# Patient Record
Sex: FEMALE | Race: WHITE | NOT HISPANIC OR LATINO | Employment: FULL TIME | URBAN - METROPOLITAN AREA
[De-identification: names, ages, dates, MRNs, and addresses within clinical notes are randomized per-mention and may not be internally consistent; named-entity substitution may affect disease eponyms.]

---

## 2017-01-08 ENCOUNTER — APPOINTMENT (OUTPATIENT)
Dept: CARDIOLOGY | Facility: HOSPITAL | Age: 67
End: 2017-01-08
Attending: INTERNAL MEDICINE

## 2017-01-08 ENCOUNTER — APPOINTMENT (OUTPATIENT)
Dept: CT IMAGING | Facility: HOSPITAL | Age: 67
End: 2017-01-08

## 2017-01-08 ENCOUNTER — HOSPITAL ENCOUNTER (INPATIENT)
Facility: HOSPITAL | Age: 67
LOS: 2 days | Discharge: HOME | End: 2017-01-10
Attending: EMERGENCY MEDICINE | Admitting: INTERNAL MEDICINE

## 2017-01-08 DIAGNOSIS — E66.9 DIABETES MELLITUS TYPE 2 IN OBESE (HCC): ICD-10-CM

## 2017-01-08 DIAGNOSIS — N18.9 CHRONIC RENAL INSUFFICIENCY, UNSPECIFIED STAGE: ICD-10-CM

## 2017-01-08 DIAGNOSIS — I96 NECROTIC TOES (HCC): ICD-10-CM

## 2017-01-08 DIAGNOSIS — E11.69 DIABETES MELLITUS TYPE 2 IN OBESE (HCC): ICD-10-CM

## 2017-01-08 DIAGNOSIS — L03.115 CELLULITIS OF RIGHT LOWER EXTREMITY WITHOUT FOOT: Primary | ICD-10-CM

## 2017-01-08 DIAGNOSIS — I73.9 PERIPHERAL VASCULAR DISEASE (HCC): ICD-10-CM

## 2017-01-08 DIAGNOSIS — I10 ESSENTIAL HYPERTENSION: ICD-10-CM

## 2017-01-08 DIAGNOSIS — I87.2 STASIS DERMATITIS OF BOTH LEGS: ICD-10-CM

## 2017-01-08 PROBLEM — I25.10 CAD (CORONARY ARTERY DISEASE): Status: ACTIVE | Noted: 2017-01-08

## 2017-01-08 PROBLEM — N18.30 CKD STAGE 3 DUE TO TYPE 2 DIABETES MELLITUS: Status: ACTIVE | Noted: 2017-01-08

## 2017-01-08 PROBLEM — E66.01 MORBID OBESITY DUE TO EXCESS CALORIES (HCC): Status: ACTIVE | Noted: 2017-01-08

## 2017-01-08 PROBLEM — E03.9 HYPOTHYROID: Status: ACTIVE | Noted: 2017-01-08

## 2017-01-08 PROBLEM — E11.22 CKD STAGE 3 DUE TO TYPE 2 DIABETES MELLITUS: Status: ACTIVE | Noted: 2017-01-08

## 2017-01-08 PROBLEM — IMO0002 DIABETES TYPE 2, UNCONTROLLED: Status: ACTIVE | Noted: 2017-01-08

## 2017-01-08 LAB
ALBUMIN SERPL-MCNC: 3.7 G/DL (ref 3.2–4.8)
ALBUMIN/GLOB SERPL: 1 G/DL (ref 1.5–2.5)
ALP SERPL-CCNC: 76 U/L (ref 25–100)
ALT SERPL W P-5'-P-CCNC: 10 U/L (ref 7–40)
ANION GAP SERPL CALCULATED.3IONS-SCNC: 8 MMOL/L (ref 3–11)
AST SERPL-CCNC: 11 U/L (ref 0–33)
BACTERIA UR QL AUTO: ABNORMAL /HPF
BASOPHILS # BLD AUTO: 0.04 10*3/MM3 (ref 0–0.2)
BASOPHILS NFR BLD AUTO: 0.3 % (ref 0–1)
BILIRUB SERPL-MCNC: 0.3 MG/DL (ref 0.3–1.2)
BILIRUB UR QL STRIP: ABNORMAL
BUN BLD-MCNC: 24 MG/DL (ref 9–23)
BUN/CREAT SERPL: 16 (ref 7–25)
CALCIUM SPEC-SCNC: 9.5 MG/DL (ref 8.7–10.4)
CHLORIDE SERPL-SCNC: 104 MMOL/L (ref 99–109)
CLARITY UR: ABNORMAL
CO2 SERPL-SCNC: 24 MMOL/L (ref 20–31)
COLOR UR: ABNORMAL
CREAT BLD-MCNC: 1.5 MG/DL (ref 0.6–1.3)
CRP SERPL-MCNC: 89.4 MG/DL (ref 0–10)
D-LACTATE SERPL-SCNC: 1.3 MMOL/L (ref 0.5–2)
DEPRECATED RDW RBC AUTO: 44.3 FL (ref 37–54)
EOSINOPHIL # BLD AUTO: 0.58 10*3/MM3 (ref 0.1–0.3)
EOSINOPHIL NFR BLD AUTO: 3.9 % (ref 0–3)
ERYTHROCYTE [DISTWIDTH] IN BLOOD BY AUTOMATED COUNT: 13.5 % (ref 11.3–14.5)
ERYTHROCYTE [SEDIMENTATION RATE] IN BLOOD: 17 MM/HR (ref 0–30)
GFR SERPL CREATININE-BSD FRML MDRD: 35 ML/MIN/1.73
GLOBULIN UR ELPH-MCNC: 3.6 GM/DL
GLUCOSE BLD-MCNC: 141 MG/DL (ref 70–100)
GLUCOSE BLDC GLUCOMTR-MCNC: 189 MG/DL (ref 70–130)
GLUCOSE BLDC GLUCOMTR-MCNC: 240 MG/DL (ref 70–130)
GLUCOSE BLDC GLUCOMTR-MCNC: 243 MG/DL (ref 70–130)
GLUCOSE BLDC GLUCOMTR-MCNC: 303 MG/DL (ref 70–130)
GLUCOSE UR STRIP-MCNC: NEGATIVE MG/DL
HBA1C MFR BLD: 9.6 % (ref 4.8–5.6)
HCT VFR BLD AUTO: 41.8 % (ref 34.5–44)
HGB BLD-MCNC: 13.6 G/DL (ref 11.5–15.5)
HGB UR QL STRIP.AUTO: NEGATIVE
HYALINE CASTS UR QL AUTO: ABNORMAL /LPF
IMM GRANULOCYTES # BLD: 0.11 10*3/MM3 (ref 0–0.03)
IMM GRANULOCYTES NFR BLD: 0.7 % (ref 0–0.6)
KETONES UR QL STRIP: ABNORMAL
LEUKOCYTE ESTERASE UR QL STRIP.AUTO: ABNORMAL
LIPASE SERPL-CCNC: 48 U/L (ref 6–51)
LYMPHOCYTES # BLD AUTO: 3.7 10*3/MM3 (ref 0.6–4.8)
LYMPHOCYTES NFR BLD AUTO: 24.8 % (ref 24–44)
MCH RBC QN AUTO: 28.8 PG (ref 27–31)
MCHC RBC AUTO-ENTMCNC: 32.5 G/DL (ref 32–36)
MCV RBC AUTO: 88.6 FL (ref 80–99)
MONOCYTES # BLD AUTO: 1.32 10*3/MM3 (ref 0–1)
MONOCYTES NFR BLD AUTO: 8.8 % (ref 0–12)
NEUTROPHILS # BLD AUTO: 9.17 10*3/MM3 (ref 1.5–8.3)
NEUTROPHILS NFR BLD AUTO: 61.5 % (ref 41–71)
NITRITE UR QL STRIP: NEGATIVE
PH UR STRIP.AUTO: <=5 [PH] (ref 5–8)
PLAT MORPH BLD: NORMAL
PLATELET # BLD AUTO: 448 10*3/MM3 (ref 150–450)
PMV BLD AUTO: 9.7 FL (ref 6–12)
POTASSIUM BLD-SCNC: 4.7 MMOL/L (ref 3.5–5.5)
PROT SERPL-MCNC: 7.3 G/DL (ref 5.7–8.2)
PROT UR QL STRIP: ABNORMAL
RBC # BLD AUTO: 4.72 10*6/MM3 (ref 3.89–5.14)
RBC # UR: ABNORMAL /HPF
RBC MORPH BLD: NORMAL
REF LAB TEST METHOD: ABNORMAL
SODIUM BLD-SCNC: 136 MMOL/L (ref 132–146)
SP GR UR STRIP: 1.03 (ref 1–1.03)
SQUAMOUS #/AREA URNS HPF: ABNORMAL /HPF
UROBILINOGEN UR QL STRIP: ABNORMAL
WBC MORPH BLD: NORMAL
WBC NRBC COR # BLD: 14.92 10*3/MM3 (ref 3.5–10.8)
WBC UR QL AUTO: ABNORMAL /HPF

## 2017-01-08 PROCEDURE — 93970 EXTREMITY STUDY: CPT

## 2017-01-08 PROCEDURE — 63710000001 INSULIN DETEMIR PER 5 UNITS: Performed by: INTERNAL MEDICINE

## 2017-01-08 PROCEDURE — G0378 HOSPITAL OBSERVATION PER HR: HCPCS

## 2017-01-08 PROCEDURE — 25010000002 HEPARIN (PORCINE) PER 1000 UNITS: Performed by: INTERNAL MEDICINE

## 2017-01-08 PROCEDURE — 25010000002 KETOROLAC TROMETHAMINE PER 15 MG: Performed by: EMERGENCY MEDICINE

## 2017-01-08 PROCEDURE — 87040 BLOOD CULTURE FOR BACTERIA: CPT | Performed by: EMERGENCY MEDICINE

## 2017-01-08 PROCEDURE — 83690 ASSAY OF LIPASE: CPT | Performed by: EMERGENCY MEDICINE

## 2017-01-08 PROCEDURE — 82962 GLUCOSE BLOOD TEST: CPT

## 2017-01-08 PROCEDURE — 80053 COMPREHEN METABOLIC PANEL: CPT | Performed by: EMERGENCY MEDICINE

## 2017-01-08 PROCEDURE — 81001 URINALYSIS AUTO W/SCOPE: CPT | Performed by: EMERGENCY MEDICINE

## 2017-01-08 PROCEDURE — 85652 RBC SED RATE AUTOMATED: CPT | Performed by: INTERNAL MEDICINE

## 2017-01-08 PROCEDURE — 86140 C-REACTIVE PROTEIN: CPT | Performed by: INTERNAL MEDICINE

## 2017-01-08 PROCEDURE — 99283 EMERGENCY DEPT VISIT LOW MDM: CPT

## 2017-01-08 PROCEDURE — 63710000001 INSULIN LISPRO (HUMAN) PER 5 UNITS: Performed by: INTERNAL MEDICINE

## 2017-01-08 PROCEDURE — 85025 COMPLETE CBC W/AUTO DIFF WBC: CPT | Performed by: EMERGENCY MEDICINE

## 2017-01-08 PROCEDURE — 83036 HEMOGLOBIN GLYCOSYLATED A1C: CPT | Performed by: INTERNAL MEDICINE

## 2017-01-08 PROCEDURE — 73700 CT LOWER EXTREMITY W/O DYE: CPT

## 2017-01-08 PROCEDURE — 25010000002 VANCOMYCIN: Performed by: EMERGENCY MEDICINE

## 2017-01-08 PROCEDURE — 87086 URINE CULTURE/COLONY COUNT: CPT | Performed by: EMERGENCY MEDICINE

## 2017-01-08 PROCEDURE — 85007 BL SMEAR W/DIFF WBC COUNT: CPT | Performed by: EMERGENCY MEDICINE

## 2017-01-08 PROCEDURE — 83605 ASSAY OF LACTIC ACID: CPT | Performed by: EMERGENCY MEDICINE

## 2017-01-08 PROCEDURE — 99223 1ST HOSP IP/OBS HIGH 75: CPT | Performed by: INTERNAL MEDICINE

## 2017-01-08 RX ORDER — KETOROLAC TROMETHAMINE 15 MG/ML
15 INJECTION, SOLUTION INTRAMUSCULAR; INTRAVENOUS ONCE
Status: COMPLETED | OUTPATIENT
Start: 2017-01-08 | End: 2017-01-08

## 2017-01-08 RX ORDER — ONDANSETRON 2 MG/ML
4 INJECTION INTRAMUSCULAR; INTRAVENOUS EVERY 6 HOURS PRN
Status: DISCONTINUED | OUTPATIENT
Start: 2017-01-08 | End: 2017-01-10 | Stop reason: HOSPADM

## 2017-01-08 RX ORDER — DEXTROSE MONOHYDRATE 25 G/50ML
25 INJECTION, SOLUTION INTRAVENOUS AS NEEDED
Status: DISCONTINUED | OUTPATIENT
Start: 2017-01-08 | End: 2017-01-10 | Stop reason: HOSPADM

## 2017-01-08 RX ORDER — CLOPIDOGREL BISULFATE 75 MG/1
75 TABLET ORAL DAILY
Status: DISCONTINUED | OUTPATIENT
Start: 2017-01-08 | End: 2017-01-10 | Stop reason: HOSPADM

## 2017-01-08 RX ORDER — ASPIRIN 81 MG/1
81 TABLET, CHEWABLE ORAL DAILY
Status: DISCONTINUED | OUTPATIENT
Start: 2017-01-08 | End: 2017-01-10 | Stop reason: HOSPADM

## 2017-01-08 RX ORDER — VANCOMYCIN/0.9 % SOD CHLORIDE 1.75 G/5
12 PLASTIC BAG, INJECTION (ML) INTRAVENOUS EVERY 24 HOURS
Status: DISCONTINUED | OUTPATIENT
Start: 2017-01-09 | End: 2017-01-10 | Stop reason: HOSPADM

## 2017-01-08 RX ORDER — FAMOTIDINE 20 MG/1
20 TABLET, FILM COATED ORAL 2 TIMES DAILY
Status: DISCONTINUED | OUTPATIENT
Start: 2017-01-08 | End: 2017-01-10 | Stop reason: HOSPADM

## 2017-01-08 RX ORDER — LEVOTHYROXINE SODIUM 112 UG/1
112 TABLET ORAL
Status: DISCONTINUED | OUTPATIENT
Start: 2017-01-08 | End: 2017-01-10 | Stop reason: HOSPADM

## 2017-01-08 RX ORDER — DOCUSATE SODIUM 100 MG/1
100 CAPSULE, LIQUID FILLED ORAL 2 TIMES DAILY
Status: DISCONTINUED | OUTPATIENT
Start: 2017-01-08 | End: 2017-01-10 | Stop reason: HOSPADM

## 2017-01-08 RX ORDER — HEPARIN SODIUM 5000 [USP'U]/ML
5000 INJECTION, SOLUTION INTRAVENOUS; SUBCUTANEOUS EVERY 12 HOURS
Status: DISCONTINUED | OUTPATIENT
Start: 2017-01-08 | End: 2017-01-10 | Stop reason: HOSPADM

## 2017-01-08 RX ORDER — SODIUM CHLORIDE 0.9 % (FLUSH) 0.9 %
1-10 SYRINGE (ML) INJECTION AS NEEDED
Status: DISCONTINUED | OUTPATIENT
Start: 2017-01-08 | End: 2017-01-10 | Stop reason: HOSPADM

## 2017-01-08 RX ORDER — ATORVASTATIN CALCIUM 20 MG/1
20 TABLET, FILM COATED ORAL NIGHTLY
Status: DISCONTINUED | OUTPATIENT
Start: 2017-01-08 | End: 2017-01-10 | Stop reason: HOSPADM

## 2017-01-08 RX ORDER — ONDANSETRON 4 MG/1
4 TABLET, FILM COATED ORAL EVERY 6 HOURS PRN
Status: DISCONTINUED | OUTPATIENT
Start: 2017-01-08 | End: 2017-01-10 | Stop reason: HOSPADM

## 2017-01-08 RX ORDER — SODIUM CHLORIDE 9 MG/ML
100 INJECTION, SOLUTION INTRAVENOUS CONTINUOUS
Status: DISCONTINUED | OUTPATIENT
Start: 2017-01-08 | End: 2017-01-08

## 2017-01-08 RX ORDER — ACETAMINOPHEN 325 MG/1
650 TABLET ORAL EVERY 6 HOURS PRN
Status: DISCONTINUED | OUTPATIENT
Start: 2017-01-08 | End: 2017-01-10 | Stop reason: HOSPADM

## 2017-01-08 RX ORDER — SODIUM CHLORIDE 0.9 % (FLUSH) 0.9 %
10 SYRINGE (ML) INJECTION AS NEEDED
Status: DISCONTINUED | OUTPATIENT
Start: 2017-01-08 | End: 2017-01-10 | Stop reason: HOSPADM

## 2017-01-08 RX ORDER — NICOTINE POLACRILEX 4 MG
15 LOZENGE BUCCAL AS NEEDED
Status: DISCONTINUED | OUTPATIENT
Start: 2017-01-08 | End: 2017-01-10 | Stop reason: HOSPADM

## 2017-01-08 RX ADMIN — SODIUM CHLORIDE 100 ML/HR: 9 INJECTION, SOLUTION INTRAVENOUS at 08:00

## 2017-01-08 RX ADMIN — INSULIN DETEMIR 25 UNITS: 100 INJECTION, SOLUTION SUBCUTANEOUS at 11:55

## 2017-01-08 RX ADMIN — INSULIN LISPRO 10 UNITS: 100 INJECTION, SOLUTION INTRAVENOUS; SUBCUTANEOUS at 11:57

## 2017-01-08 RX ADMIN — HEPARIN SODIUM 5000 UNITS: 5000 INJECTION, SOLUTION INTRAVENOUS; SUBCUTANEOUS at 21:49

## 2017-01-08 RX ADMIN — DOCUSATE SODIUM 100 MG: 100 CAPSULE, LIQUID FILLED ORAL at 18:20

## 2017-01-08 RX ADMIN — VANCOMYCIN HYDROCHLORIDE 2000 MG: 1 INJECTION, POWDER, LYOPHILIZED, FOR SOLUTION INTRAVENOUS at 03:27

## 2017-01-08 RX ADMIN — KETOROLAC TROMETHAMINE 15 MG: 15 INJECTION, SOLUTION INTRAMUSCULAR; INTRAVENOUS at 03:18

## 2017-01-08 RX ADMIN — INSULIN LISPRO 8 UNITS: 100 INJECTION, SOLUTION INTRAVENOUS; SUBCUTANEOUS at 11:55

## 2017-01-08 RX ADMIN — HEPARIN SODIUM 5000 UNITS: 5000 INJECTION, SOLUTION INTRAVENOUS; SUBCUTANEOUS at 09:59

## 2017-01-08 RX ADMIN — DOCUSATE SODIUM 100 MG: 100 CAPSULE, LIQUID FILLED ORAL at 09:59

## 2017-01-08 RX ADMIN — ASPIRIN 81 MG 81 MG: 81 TABLET ORAL at 09:59

## 2017-01-08 RX ADMIN — INSULIN LISPRO 3 UNITS: 100 INJECTION, SOLUTION INTRAVENOUS; SUBCUTANEOUS at 10:00

## 2017-01-08 RX ADMIN — INSULIN LISPRO 8 UNITS: 100 INJECTION, SOLUTION INTRAVENOUS; SUBCUTANEOUS at 10:00

## 2017-01-08 RX ADMIN — CLOPIDOGREL 75 MG: 75 TABLET, FILM COATED ORAL at 10:00

## 2017-01-08 RX ADMIN — FAMOTIDINE 20 MG: 20 TABLET ORAL at 18:21

## 2017-01-08 RX ADMIN — LEVOTHYROXINE SODIUM 112 MCG: 112 TABLET ORAL at 06:46

## 2017-01-08 RX ADMIN — ATORVASTATIN CALCIUM 20 MG: 20 TABLET, FILM COATED ORAL at 21:50

## 2017-01-08 RX ADMIN — INSULIN LISPRO 5 UNITS: 100 INJECTION, SOLUTION INTRAVENOUS; SUBCUTANEOUS at 21:49

## 2017-01-08 RX ADMIN — ERTAPENEM SODIUM 1 G: 1 INJECTION, POWDER, LYOPHILIZED, FOR SOLUTION INTRAMUSCULAR; INTRAVENOUS at 13:35

## 2017-01-08 RX ADMIN — INSULIN LISPRO 8 UNITS: 100 INJECTION, SOLUTION INTRAVENOUS; SUBCUTANEOUS at 18:21

## 2017-01-08 RX ADMIN — SODIUM CHLORIDE 100 ML/HR: 9 INJECTION, SOLUTION INTRAVENOUS at 06:48

## 2017-01-08 RX ADMIN — FAMOTIDINE 20 MG: 20 TABLET ORAL at 09:59

## 2017-01-08 RX ADMIN — INSULIN LISPRO 5 UNITS: 100 INJECTION, SOLUTION INTRAVENOUS; SUBCUTANEOUS at 18:21

## 2017-01-08 RX ADMIN — INSULIN DETEMIR 25 UNITS: 100 INJECTION, SOLUTION SUBCUTANEOUS at 21:49

## 2017-01-08 NOTE — H&P
[facility] Medicine History and Physical    Primary Care Physician: No Known Provider    Chief complaint     R.lower ext cellulitis.    History of Present Illness  66 year morbidly obese female presented to the ER with the chief complaint of right lower extremity erythema and pain.  Symptoms started approximately 3 days back when she started noticing erythema, today she got tightness in her calf and pain on weightbearing and that is why she presented to the ER.  She does complain of  chills diaphoresis.  She also states she has the right fourth toenail came off approximately 10 days back, she did go to the podiatrist and they were just doing some dressings and monitoring it.    ROS  Review of Systems   Constitutional: Positive for activity change, chills and diaphoresis. Negative for fatigue and fever.   HENT: Negative for ear discharge, nosebleeds, sore throat and trouble swallowing.    Eyes: Negative for photophobia, discharge and visual disturbance.   Respiratory: Negative for cough, chest tightness, shortness of breath and wheezing.    Cardiovascular: Negative for chest pain and palpitations.   Gastrointestinal: Negative for abdominal pain, blood in stool, diarrhea and vomiting.   Endocrine: Negative for cold intolerance, polydipsia and polyuria.   Genitourinary: Negative for dysuria, flank pain and hematuria.   Musculoskeletal: Negative for joint swelling and neck stiffness.   Skin: Positive for color change (on her R.lower ext.), rash and wound.   Neurological: Negative for dizziness, seizures, syncope, speech difficulty and headaches.   Hematological: Negative for adenopathy. Does not bruise/bleed easily.   Psychiatric/Behavioral: Negative for agitation and confusion. The patient is not nervous/anxious.         Otherwise complete ROS is negative except as mentioned in the HPI.    Past Medical History:  Active Problems:    * No active hospital problems. *       Past Medical History   Diagnosis Date   • CKD (chronic  kidney disease)    • Coronary artery disease-sp pci in past, no chestpain    • Diabetes mellitus-poor control    • Dyslipidemia    • Gangrene sp amp of 5th R.toe    • Hypertension    • Hypothyroidism          Past Surgical History:  Past Surgical History   Procedure Laterality Date   • Tonsillectomy     • Foot surgery Right 12/11/2015     Right 5Th Toe Amputation   • Coronary angioplasty with stent placement     • Toe amputation         Family History:   family history includes Coronary artery disease in her mother; Diabetes in her sister; Hypertension in her mother; Leukemia in her father; Macular degeneration in her mother; Other in her mother.    Social History:    reports that she has never smoked. She has never used smokeless tobacco. She reports that she does not drink alcohol or use illicit drugs.    Medications:    (Not in a hospital admission)  Allergies   Allergen Reactions   • Amoxicillin            Physical Exam:   Temp:  [98.1 °F (36.7 °C)] 98.1 °F (36.7 °C)  Heart Rate:  [77] 77  Resp:  [18] 18  BP: (129)/(62) 129/62  VITALS-   AS ABOVE.  GENERAL- Not distressed, well nourished.  Morbidly obese  RS- CTA-BL, ,  No wheezing , no crackles, good effort.  CVS- s1s2 Regular, no murmur.  ABD- soft, non tender,  distended, no organomegaly. BS good.  EXT- no edema. Pulses poorly palpable more on L then R.venostatic changes on L.ankle area.  But has erythematous changes on the right lower extremity extending up to the knee, patient had almost wet gangrenous looking fourth right toe with foul odor.  NEURO- AAO-3, power 5/5 in all ext,  gross sensory deficit both feet., cranial nerves intact.   EYES- Conjunctivae are normal. Pupils are equal, round, and reactive to light. No scleral icterus.   ENT- no external ear nose lesions, mucosa dry.  NECK-  No tracheal deviation present. No thyromegaly present,No cervical lymphadenopathy.  JOINTS/MSK- no deformity, no swelling.  SKIN-  Rash as described , warm to  touch.  PSYCHIATRIC-  has a normal mood and affect.Thought content normal.           Results Reviewed:  I have personally reviewed current lab, radiology, and data and agree.  Lab Results   Component Value Date    GLUCOSE 141 (H) 01/08/2017    BUN 24 (H) 01/08/2017    CREATININE 1.50 (H) 01/08/2017    EGFRIFNONA 35 (L) 01/08/2017    BCR 16.0 01/08/2017    CO2 24.0 01/08/2017    CALCIUM 9.5 01/08/2017    ALBUMIN 3.70 01/08/2017    LABIL2 1.0 (L) 01/08/2017    AST 11 01/08/2017    ALT 10 01/08/2017     Lab Results   Component Value Date    WBC 14.92 (H) 01/08/2017    HGB 13.6 01/08/2017    HCT 41.8 01/08/2017    MCV 88.6 01/08/2017     01/08/2017     No results found for: CKTOTAL, CKMB, CKMBINDEX, TROPONINI, TROPONINT    Imaging Results (last 24 hours)     ** No results found for the last 24 hours. **              OLD RECORDS REVIEW BY ME.      Assessment/Plan   Right lower extremity cellulitis  -With WBC of 14, with chills, started on vancomycin we'll add on Invanz check ESR CRP.  -Patient has poorly palpable pulses more so on the left as compared to the right, we will try to Doppler the pulse may need further's peripheral vascular studies.    Right fourth great toe infection  -May need further MRI/ct scan  to see the infectious process.  -Continue antibiotics    DM 2-fingerstick 141  -Patient is supposed to take Levemir 100 units in the morning/120 at night, using only 50 units twice a day currently.  -Fingerstick coverage plus long-acting 25 twice a day.    CRI with a KI.  -Patient clinically appears dehydrated, IV fluids  -Check UA.    Hypothyroid  Hypertension  Hyperlipidemia  -All of the above are stable continue her home medications, we'll hold her lisinopril since she is in acute kidney injury area did    DVT PXL  -daniel's/scds  -lovenox      I discussed the patients findings and my recommendations with: patient/family.      Opal Villafuerte MD  01/08/17  4:31 AM

## 2017-01-08 NOTE — PROGRESS NOTES
"Discharge Planning Assessment  [facility]     Patient Name: Nuris Ribeiro  MRN: [id number]  Today's Date: 1/8/2017    Admit Date: 1/8/2017          Discharge Needs Assessment       01/08/17 1032    Living Environment    Lives With spouse    Living Arrangements house    Provides Primary Care For no one    Quality Of Family Relationships unable to assess    Able to Return to Prior Living Arrangements yes    Discharge Needs Assessment    Concerns To Be Addressed denies needs/concerns at this time;no discharge needs identified    Readmission Within The Last 30 Days no previous admission in last 30 days    Anticipated Changes Related to Illness none    Equipment Currently Used at Home none    Equipment Needed After Discharge none    Transportation Available car;family or friend will provide    Discharge Disposition home or self-care    Discharge Contact Information if Applicable Shai Ribeiro, spouse  [phone]            Discharge Plan       01/08/17 1034    Case Management/Social Work Plan    Plan Home    Patient/Family In Agreement With Plan yes    Additional Comments Spoke with patient at bedside regarding discharge planning.  Patient denies use of Home Health or DME.  Patient denies difficulty affording medications.  Patient lives in a multilevel home with her , both work full time.  Patient indicates some difficulty affording diabetic medications and admits to \"stretching\" it as far as she can because of the expense.  Patient has had an amputation of a toe in the past.  CM to follow for needs.  Patient goal is to be discharged home via car with family when medically ready.        Discharge Placement     No information found                Demographic Summary       01/08/17 1031    Referral Information    Admission Type inpatient    Arrived From home or self-care    Referral Source admission list    Reason For Consult discharge planning    Primary Care Physician Information    Name Pa Milan MD "            Functional Status       01/08/17 1032    Functional Status Current    Current Functional Level Comment Per Nursing Assessment    Functional Status Prior    Ambulation 0-->independent    Transferring 0-->independent    Toileting 0-->independent    Bathing 0-->independent    Dressing 0-->independent    Eating 0-->independent    Communication 0-->understands/communicates without difficulty    Swallowing 0-->swallows foods/liquids without difficulty    IADL    Medications independent    Meal Preparation independent    Housekeeping independent    Laundry independent    Shopping independent    Oral Care independent    Activity Tolerance    Current Activity Limitations none    Usual Activity Tolerance good    Current Activity Tolerance good    Employment/Financial    Employment/Finance Comments Medicare/[employer] Med Supp/[employer] Rx            Psychosocial     None            Abuse/Neglect     None            Legal     None            Substance Abuse     None            Patient Forms     None          Dianna Li RN

## 2017-01-08 NOTE — PLAN OF CARE
Problem: Pain, Acute (Adult)  Goal: Identify Related Risk Factors and Signs and Symptoms  Outcome: Ongoing (interventions implemented as appropriate)    01/08/17 0518   Pain, Acute   Related Risk Factors (Acute Pain) developmental disability;disease process;knowledge deficit;patient perception;persistent pain   Signs and Symptoms (Acute Pain) alteration in muscle tone;sleep pattern alteration;verbalization of pain descriptors

## 2017-01-08 NOTE — ED PROVIDER NOTES
Subjective   HPI Comments: Nuris Ribeiro is a 66 y.o.female who presents to the ED with c/o leg swelling. 2 days ago she began having swelling, redness and pain in her right calf that has steadily worsened and has not improved with elevation or ice. Tonight her leg began to feel tight, prompting her to come to the ED. Additionally pt reports a toenail has broken off several days ago and she has been seen by her podiatrist for this.     Hx of DM,     Patient is a 66 y.o. female presenting with lower extremity pain.   History provided by:  Patient  Lower Extremity Issue   Location:  Leg  Time since incident:  2 days  Injury: no    Leg location:  R leg  Pain details:     Quality:  Throbbing    Severity:  Mild    Onset quality:  Gradual    Duration:  3 days    Timing:  Constant    Progression:  Worsening  Chronicity:  New  Dislocation: no    Relieved by:  Nothing  Worsened by:  Nothing  Associated symptoms: swelling    Associated symptoms: no fever        Review of Systems   Constitutional: Negative for chills and fever.   Respiratory: Negative for shortness of breath.    Cardiovascular: Positive for leg swelling. Negative for chest pain.   Skin: Positive for color change.   All other systems reviewed and are negative.      Past Medical History   Diagnosis Date   • CKD (chronic kidney disease)    • Coronary artery disease    • Diabetes mellitus    • Dyslipidemia    • Gangrene    • Hypertension    • Hypothyroidism        Allergies   Allergen Reactions   • Amoxicillin        Past Surgical History   Procedure Laterality Date   • Tonsillectomy     • Foot surgery Right 12/11/2015     Right 5Th Toe Amputation   • Coronary angioplasty with stent placement     • Toe amputation         Family History   Problem Relation Age of Onset   • Hypertension Mother    • Coronary artery disease Mother    • Macular degeneration Mother    • Other Mother      DYSLIPIDEMIA   • Leukemia Father    • Diabetes Sister        Social History      Social History   • Marital status:      Spouse name: N/A   • Number of children: N/A   • Years of education: N/A     Social History Main Topics   • Smoking status: Never Smoker   • Smokeless tobacco: Never Used   • Alcohol use No   • Drug use: No   • Sexual activity: Not Asked     Other Topics Concern   • None     Social History Narrative   • None         Objective   Physical Exam   Constitutional: She is oriented to person, place, and time. She appears well-developed and well-nourished. No distress.   HENT:   Head: Normocephalic and atraumatic.   Eyes: Conjunctivae are normal.   Neck: Trachea normal, normal range of motion and phonation normal. Neck supple. No JVD present.   Cardiovascular: Normal rate, regular rhythm, normal heart sounds and intact distal pulses.    Pulmonary/Chest: Effort normal and breath sounds normal. No respiratory distress.   Abdominal: Soft. There is no tenderness.   Obesity.    Musculoskeletal: Normal range of motion. She exhibits edema (RLE).   Several scab wounds on the anterior shin and ankle, 4th toe demonstrates distal ischemia, erythema from the ankle proximal nearly up to the knee with chronic stasis dermatitis bilaterally with R>L.  5th toe amputation on the right.    Neurological: She is alert and oriented to person, place, and time. She has normal strength. Coordination normal.   Skin: Skin is warm and dry. She is not diaphoretic. There is erythema. No pallor.   Psychiatric: She has a normal mood and affect. Her speech is normal and behavior is normal.   Nursing note and vitals reviewed.      Procedures         ED Course  ED Course   Value Comment By Time   Creatinine: (!) 1.50 (Reviewed) Donaldo Gonzalez MD 01/08 0324   WBC: (!) 14.92 (Reviewed) Donaldo Gonzalez MD 01/08 0352    The patient has findings consistent with cellulitis and edema on the right lower extremity with chronic venous stasis and stasis dermatitis of both lower extremities which is worse in  the right low.  Screening lower extremity Doppler demonstrates no evidence of DVT though it is extremely limited secondary to patient's body habitus and difficulty in cooperation.At the base to the hospital for further evaluation and management.  Case discussed with Dr. Villafuerte who agrees to plan for admission. Donaldo Gonzalez MD 01/08 0357       Course of Care      Lab Results (last 24 hours)     Procedure Component Value Units Date/Time    Blood Culture [[id number]] Collected:  01/08/17 0245    Specimen:  Blood from Arm, Left Updated:  01/08/17 0344    CBC & Differential [[id number]] Collected:  01/08/17 0248    Specimen:  Blood Updated:  01/08/17 0341    Narrative:       The following orders were created for panel order CBC & Differential.  Procedure                               Abnormality         Status                     ---------                               -----------         ------                     Scan Slide[[id number]]                    Normal              Final result               CBC Auto Differential[[id number]]         Abnormal            Final result                 Please view results for these tests on the individual orders.    Comprehensive Metabolic Panel [[id number]]  (Abnormal) Collected:  01/08/17 0248    Specimen:  Blood Updated:  01/08/17 0321     Glucose 141 (H) mg/dL      BUN 24 (H) mg/dL      Creatinine 1.50 (H) mg/dL      Sodium 136 mmol/L      Potassium 4.7 mmol/L      Chloride 104 mmol/L      CO2 24.0 mmol/L      Calcium 9.5 mg/dL      Total Protein 7.3 g/dL      Albumin 3.70 g/dL      ALT (SGPT) 10 U/L      AST (SGOT) 11 U/L      Alkaline Phosphatase 76 U/L      Total Bilirubin 0.3 mg/dL      eGFR Non African Amer 35 (L) mL/min/1.73      Globulin 3.6 gm/dL      A/G Ratio 1.0 (L) g/dL      BUN/Creatinine Ratio 16.0      Anion Gap 8.0 mmol/L     Narrative:       National Kid[employer] Guidelines    Stage                           Description                             GFR                       1                               Normal or High                          90+  2                               Mild decrease                            60-89  3                               Moderate decrease                   30-59  4                               Severe decrease                       15-29  5                               Kidney failure                             <15    Lipase [[id number]]  (Normal) Collected:  01/08/17 0248    Specimen:  Blood Updated:  01/08/17 0321     Lipase 48 U/L     Blood Culture [[id number]] Collected:  01/08/17 0248    Specimen:  Blood from Arm, Left Updated:  01/08/17 0344    CBC Auto Differential [[id number]]  (Abnormal) Collected:  01/08/17 0248    Specimen:  Blood Updated:  01/08/17 0341     WBC 14.92 (H) 10*3/mm3      RBC 4.72 10*6/mm3      Hemoglobin 13.6 g/dL      Hematocrit 41.8 %      MCV 88.6 fL      MCH 28.8 pg      MCHC 32.5 g/dL      RDW 13.5 %      RDW-SD 44.3 fl      MPV 9.7 fL      Platelets 448 10*3/mm3      Neutrophil % 61.5 %      Lymphocyte % 24.8 %      Monocyte % 8.8 %      Eosinophil % 3.9 (H) %      Basophil % 0.3 %      Immature Grans % 0.7 (H) %      Neutrophils, Absolute 9.17 (H) 10*3/mm3      Lymphocytes, Absolute 3.70 10*3/mm3      Monocytes, Absolute 1.32 (H) 10*3/mm3      Eosinophils, Absolute 0.58 (H) 10*3/mm3      Basophils, Absolute 0.04 10*3/mm3      Immature Grans, Absolute 0.11 (H) 10*3/mm3     Scan Slide [[id number]]  (Normal) Collected:  01/08/17 0248    Specimen:  Blood Updated:  01/08/17 0341     RBC Morphology Normal      WBC Morphology Normal      Platelet Morphology Normal     Lactic Acid, Plasma [[id number]]  (Normal) Collected:  01/08/17 0321    Specimen:  Blood Updated:  01/08/17 0417     Lactate 1.3 mmol/L       Falsely depressed results may occur on samples drawn from patients receiving N-Acetylcysteine (NAC) or Metamizole.       Urinalysis With / Culture If Indicated [[id number]]  (Abnormal) Collected:  01/08/17 0327     "Specimen:  Urine from Urine, Clean Catch Updated:  01/08/17 0411     Color, UA Red (A)      Appearance, UA Cloudy (A)      pH, UA <=5.0      Specific Gravity, UA 1.035 (H)      Glucose, UA Negative      Ketones, UA Trace (A)      Bilirubin, UA Small (1+) (A)      Blood, UA Negative      Protein, UA 30 mg/dL (1+) (A)      Leuk Esterase, UA Trace (A)      Nitrite, UA Negative      Urobilinogen, UA 0.2 E.U./dL     Urinalysis, Microscopic Only [[id number]]  (Abnormal) Collected:  01/08/17 0327    Specimen:  Urine from Urine, Clean Catch Updated:  01/08/17 0411     RBC, UA  /HPF      Unable to determine due to loaded field (A)     WBC, UA 6-12 (A) /HPF      Bacteria, UA Trace /HPF      Squamous Epithelial Cells, UA 21-30 (A) /HPF      Hyaline Casts, UA 13-20 /LPF      Methodology Manual Light Microscopy     Urine Culture [[id number]] Collected:  01/08/17 0327    Specimen:  Urine from Urine, Clean Catch Updated:  01/08/17 0350          Note: In addition to lab results from this visit, the labs listed above may include labs taken at another facility or during a different encounter within the last 24 hours. Please correlate lab times with ED admission and discharge times for further clarification of the services performed during this visit.    No orders to display       Vitals:    01/08/17 0205   BP: 129/62   BP Location: Left arm   Patient Position: Sitting   Pulse: 77   Resp: 18   Temp: 98.1 °F (36.7 °C)   TempSrc: Oral   SpO2: 95%   Weight: (!) 305 lb (138 kg)   Height: 68\" (172.7 cm)       Medications   sodium chloride 0.9 % flush 10 mL (not administered)   vancomycin 2000 mg/500 mL 0.9% NS IVPB ([facility]) (2,000 mg Intravenous New Bag 1/8/17 0327)   ketorolac (TORADOL) injection 15 mg (15 mg Intravenous Given 1/8/17 0318)       ECG/EMG Results (last 24 hours)     ** No results found for the last 24 hours. **                        MDM  Number of Diagnoses or Management Options  Cellulitis of right lower extremity without foot: "   Chronic renal insufficiency, unspecified stage:   Diabetes mellitus type 2 in obese:   Essential hypertension:   Necrotic toes:   Peripheral vascular disease:   Stasis dermatitis of both legs:      Amount and/or Complexity of Data Reviewed  Clinical lab tests: reviewed  Decide to obtain previous medical records or to obtain history from someone other than the patient: yes  Review and summarize past medical records: yes  Discuss the patient with other providers: yes  Independent visualization of images, tracings, or specimens: yes      EMR Dragon/Transcription disclaimer:   Much of this encounter note is an electronic transcription/translation of spoken language to printed text. The electronic translation of spoken language may permit erroneous, or at times, nonsensical words or phrases to be inadvertently transcribed; Although I have reviewed the note for such errors, some may still exist.     Final diagnoses:   Cellulitis of right lower extremity without foot   Peripheral vascular disease   Necrotic toes   Diabetes mellitus type 2 in obese   Essential hypertension   Chronic renal insufficiency, unspecified stage   Stasis dermatitis of both legs       Documentation assistance provided by misbah Salinas.  Information recorded by the scribaileen was done at my direction and has been verified and validated by me.     Mynor Salinas  01/08/17 0252       Mynor Salinas  01/08/17 0400       Donaldo Gonzalez MD  01/08/17 0437

## 2017-01-08 NOTE — PROGRESS NOTES
Pharmacokinetic Consult - Vancomycin Dosing    Nuris Ribeiro is a 66 y.o. female who has been consulted for vancomycin dosing for complicated skin and soft tissue infection.    Relevant clinical data and objective history reviewed:  CREATININE   Date Value Ref Range Status   01/08/2017 1.50 (H) 0.60 - 1.30 mg/dL Final   01/07/2016 1.2 0.6 - 1.3 mg/dL Final   12/31/2015 1.1 0.6 - 1.3 mg/dL Final   12/12/2015 1.2 0.6 - 1.3 mg/dL Final     BUN   Date Value Ref Range Status   01/08/2017 24 (H) 9 - 23 mg/dL Final   01/07/2016 19 9 - 23 mg/dL Final   12/31/2015 22 9 - 23 mg/dL Final   12/12/2015 19 9 - 23 mg/dL Final     Estimated Creatinine Clearance: 55.2 mL/min (by C-G formula based on Cr of 1.5).     Lab Results   Component Value Date/Time    WBC 14.92 (H) 01/08/2017 02:48 AM    WBC 14.46 (H) 01/07/2016 04:47 PM    HGB 13.6 01/08/2017 02:48 AM    HGB 15.3 01/07/2016 04:47 PM    HCT 41.8 01/08/2017 02:48 AM    HCT 46.7 (H) 01/07/2016 04:47 PM    MCV 88.6 01/08/2017 02:48 AM    MCV 90.7 01/07/2016 04:47 PM     01/08/2017 02:48 AM     01/07/2016 04:47 PM     Temp Readings from Last 3 Encounters:   01/08/17 98.1 °F (36.7 °C) (Oral)   06/16/16 97.7 °F (36.5 °C)   02/18/16 98.2 °F (36.8 °C)     Weight: (!) 305 lb (138 kg)      Assessment/Plan  The patient will be started on vancomycin utilizing scheduled dosing (SCr = 1.5, Est CrCl = 55). Vancomycin 2000 mg IV x 1 (given), then Vancomycin 1750 mg IV Q24H (12.7 mg/kg/dose for weight > 110 kg)  Plan for trough as patient approaches steady state, prior to the 3rd dose (1/10/17 06:00)  Due to infection severity, will target a trough of 10-15 ug/mL.  Pharmacy will continue to follow the patient’s culture results and clinical progress daily.    Jeronimo Braun RPH

## 2017-01-08 NOTE — PROGRESS NOTES
Please see the history and physical dated earlier today for full details.  Briefly she is a 66-year-old morbidly obese diabetic who presents to the emergency room with complaints of right lower extremity swelling, erythema, warmth.  She has a previous history of right fifth toe amputation in January 2015 on that leg at that time was followed by Dr. Liu and with Dr. Garza from infectious disease.  Since that time has done well.  About 10 days ago she had a toenail come off on the right fourth digit that has been having some questionable purulent drainage.  On exam her right leg has some circumferential erythema consistent with cellulitis.  She's been placed on vancomycin and Invanz.  Continue that for now I'll have Dr. Garza see her tomorrow for further antibiotic recommendations.  She'll need tight control of her glucose, and her A1c is greater than 9% currently.  Duplex of the right lower extremity is currently being done and I will follow up on those results.    Boby Sanchez MD

## 2017-01-08 NOTE — IP AVS SNAPSHOT
"   AFTER VISIT SUMMARY             Nuris Ribeiro           About your hospitalization     You were admitted on:  January 8, 2017 You last received care in the:  [facility]       Procedures & Surgeries         Medications    If you or your caregiver advised us that you are currently taking a medication and that medication is marked below as “Resume”, this simply indicates that we have reviewed those medications to make sure our new therapy recommendations do not interfere.  If you have concerns about medications other than those new ones which we are prescribing today, please consult the physician who prescribed them (or your primary physician).  Our review of your home medications is not meant to indicate that we are directing their use.             Your Medications      START taking these medications     metroNIDAZOLE 500 MG tablet   Take 1 tablet by mouth 3 (Three) Times a Day.   Commonly known as:  FLAGYL             CONTINUE taking these medications     aspirin 81 MG tablet   Take  by mouth.           clopidogrel 75 MG tablet   Take  by mouth.   Last time this was given:  1/10/2017  8:39 AM   Commonly known as:  PLAVIX           insulin detemir 100 UNIT/ML injection   Inject  under the skin.   Commonly known as:  LEVEMIR           HUMALOG KWIKPEN 100 UNIT/ML solution pen-injector   Inject  under the skin. 35 units am, 35 units lunch, 75 units at dinner   Generic drug:  Insulin Lispro           Insulin Lispro 100 UNIT/ML solution pen-injector   Inject  under the skin.   Commonly known as:  HUMALOG           Insulin Syringe-Needle U-100 31G X 5/16\" 1 ML misc           levothyroxine 112 MCG tablet   Take  by mouth.   Last time this was given:  1/10/2017  5:31 AM   Commonly known as:  SYNTHROID, LEVOTHROID           lisinopril 20 MG tablet   Take  by mouth.   Commonly known as:  PRINIVIL,ZESTRIL           metFORMIN 1000 MG tablet   Take 1,000 mg by mouth 2 (two) times a day with meals.   " "  Commonly known as:  GLUCOPHAGE           simvastatin 40 MG tablet   Take  by mouth.   Commonly known as:  ZOCOR                Where to Get Your Medications      These medications were sent to [facility] [place], [place] - [address] [place] [address] - [phone] PH - [phone] FX  [address] [place] [address]     Phone:  [phone]     metroNIDAZOLE 500 MG tablet                  Your Medications      Your Medication List           Morning Noon Evening Bedtime As Needed    aspirin 81 MG tablet   Take  by mouth.                                clopidogrel 75 MG tablet   Take  by mouth.   Commonly known as:  PLAVIX                                insulin detemir 100 UNIT/ML injection   Inject  under the skin.   Commonly known as:  LEVEMIR                                * HUMALOG KWIKPEN 100 UNIT/ML solution pen-injector   Inject  under the skin. 35 units am, 35 units lunch, 75 units at dinner   Generic drug:  Insulin Lispro                                * Insulin Lispro 100 UNIT/ML solution pen-injector   Inject  under the skin.   Commonly known as:  HUMALOG                                Insulin Syringe-Needle U-100 31G X 5/16\" 1 ML misc                                levothyroxine 112 MCG tablet   Take  by mouth.   Commonly known as:  SYNTHROID, LEVOTHROID                                lisinopril 20 MG tablet   Take  by mouth.   Commonly known as:  PRINIVIL,ZESTRIL                                metFORMIN 1000 MG tablet   Take 1,000 mg by mouth 2 (two) times a day with meals.   Commonly known as:  GLUCOPHAGE                                metroNIDAZOLE 500 MG tablet   Take 1 tablet by mouth 3 (Three) Times a Day.   Commonly known as:  FLAGYL                                simvastatin 40 MG tablet   Take  by mouth.   Commonly known as:  ZOCOR                                * Notice:  This list has 2 medication(s) that are the same as other medications prescribed for you. Read the directions carefully, " and ask your doctor or other care provider to review them with you.               Your facility administered medication list      NEW facility administered medications        DETAILS INSTRUCTIONS                cefTRIAXone 2 G injection           Morning    Around Noon    Afternoon                Instructions for After Discharge        Activity Instructions     Activity as Tolerated                 Diet Instructions     Diet: Consistent Carbohydrate, Cardiac; Thin Liquids, No Restrictions       Discharge Diet:   Consistent Carbohydrate  Cardiac      Fluid Consistency:  Thin Liquids, No Restrictions             Discharge References/Attachments     CELLULITIS, EASY-TO-READ (ENGLISH)    CEFTRIAXONE INJECTION (ENGLISH)    METRONIDAZOLE TABLETS OR CAPSULES (ENGLISH)       Follow-ups for After Discharge        Follow-up Information     Follow up with No Known Provider .    Contact information:    [facility] [address]          Follow up with Pa Garza MD .    Specialty:  Infectious Diseases    Why:  APPOINTMENT:  Tomorrow January 11, 2017 at 1:00pm and on January 17, 2017 at 8:30am    Contact information:    [facility] [address]  [facility] [address] [phone]        Referrals and Follow-ups to Schedule     Follow-Up    As directed    Follow Up Details:  Dr. Garza in office tomorrow           Additional information on Labs and Follow-ups:      YOU WILL GO TO [facility] FOR DAILY ANTIBIOTICS.                 [employer] Signup     Our records indicate that your [facility] [email or web address] account has been deactivated. If you would like to reactivate your account, please email [facility] [email or web address] or call [phone] to talk to our [email or web address] staff.         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, Coronary Heart Disease, High Blood Pressure, Underactive Thyroid, Ckd Stage 3 Due To Type 2 Diabetes Mellitus      Care Providers     Provider Service Role Specialty    Boby Sanchez MD Medicine Attending Provider Hospitalist    Sebas Anand MD Infectious Disease Consulting Physician  Infectious Diseases    Pa Garza MD Infectious Disease Consulting Physician  Infectious Diseases       Your Allergies  Date Reviewed: 1/8/2017    Allergen Reactions    Amoxicillin Not Noted      Pending Labs     Order Current Status    Blood Culture Preliminary result    Blood Culture Preliminary result      Patient Belongings Returned     Document Return of Belongings Flowsheet     Were the patient bedside belongings sent home?   --   Belongings Retrieved from Security & Sent Home   --    Belongings Sent to Safe   --   Medications Retrieved from Pharmacy & Sent Home   --              More Information      Cellulitis  Cellulitis is an infection of the skin and the tissue under the skin. The infected area is usually red and tender. This happens most often in the arms and lower legs.  HOME CARE   · Take your antibiotic medicine as told. Finish the medicine even if you start to feel better.  · Keep the infected arm or leg raised (elevated).  · Put a warm cloth on the area up to 4 times per day.  · Only take medicines as told by your doctor.  · Keep all doctor visits as told.  GET HELP IF:  · You see red streaks on the skin coming from the infected area.  · Your red area gets bigger or turns a dark color.  · Your bone or joint under the infected area is painful after the skin heals.  · Your infection comes back in the same area or different area.  · You have a puffy (swollen) bump in the infected area.  · You have new symptoms.  · You have a fever.  GET HELP RIGHT AWAY IF:   · You feel very sleepy.  · You throw up (vomit) or have watery poop (diarrhea).  · You feel sick and have muscle  aches and pains.     This information is not intended to replace advice given to you by your health care provider. Make sure you discuss any questions you have with your health care provider.     Document Released: 2009 Document Revised: 2016 Document Reviewed: 2013  [employer] Interactive Patient Education © Elsevier Inc.          Ceftriaxone injection  What is this medicine?  CEFTRIAXONE (sef try AX one) is a cephalosporin antibiotic. It is used to treat certain kinds of bacterial infections. It will not work for colds, flu, or other viral infections.  This medicine may be used for other purposes; ask your health care provider or pharmacist if you have questions.  What should I tell my health care provider before I take this medicine?  They need to know if you have any of these conditions:  -any chronic illness  -bowel disease, like colitis  -both kidney and liver disease  -high bilirubin level in  patients  -an unusual or allergic reaction to ceftriaxone, other cephalosporin or penicillin antibiotics, foods, dyes, or preservatives  -pregnant or trying to get pregnant  -breast-feeding  How should I use this medicine?  This medicine is injected into a muscle or infused it into a vein. It is usually given in a medical office or clinic. If you are to give this medicine you will be taught how to inject it. Follow instructions carefully. Use your doses at regular intervals. Do not take your medicine more often than directed. Do not skip doses or stop your medicine early even if you feel better. Do not stop taking except on your doctor's advice.  Talk to your pediatrician regarding the use of this medicine in children. Special care may be needed.  Overdosage: If you think you have taken too much of this medicine contact a poison control center or emergency room at once.  NOTE: This medicine is only for you. Do not share this medicine with others.  What if I miss a dose?  If you miss a dose,  take it as soon as you can. If it is almost time for your next dose, take only that dose. Do not take double or extra doses.  What may interact with this medicine?  Do not take this medicine with any of the following medications:  -intravenous calcium  This medicine may also interact with the following medications:  -birth control pills  This list may not describe all possible interactions. Give your health care provider a list of all the medicines, herbs, non-prescription drugs, or dietary supplements you use. Also tell them if you smoke, drink alcohol, or use illegal drugs. Some items may interact with your medicine.  What should I watch for while using this medicine?  Tell your doctor or health care professional if your symptoms do not improve or if they get worse.  Do not treat diarrhea with over the counter products. Contact your doctor if you have diarrhea that lasts more than 2 days or if it is severe and watery.  If you are being treated for a sexually transmitted disease, avoid sexual contact until you have finished your treatment. Having sex can infect your sexual partner.  Calcium may bind to this medicine and cause lung or kidney problems. Avoid calcium products while taking this medicine and for 48 hours after taking the last dose of this medicine.  What side effects may I notice from receiving this medicine?  Side effects that you should report to your doctor or health care professional as soon as possible:  -allergic reactions like skin rash, itching or hives, swelling of the face, lips, or tongue  -breathing problems  -fever, chills  -irregular heartbeat  -pain when passing urine  -seizures  -stomach pain, cramps  -unusual bleeding, bruising  -unusually weak or tired  Side effects that usually do not require medical attention (report to your doctor or health care professional if they continue or are bothersome):  -diarrhea  -dizzy, drowsy  -headache  -nausea, vomiting  -pain, swelling, irritation  where injected  -stomach upset  -sweating  This list may not describe all possible side effects. Call your doctor for medical advice about side effects. You may report side effects to FDA at 0-256-GER-4119.  Where should I keep my medicine?  Keep out of the reach of children.  Store at room temperature below 25 degrees C (77 degrees F). Protect from light. Throw away any unused vials after the expiration date.  NOTE: This sheet is a summary. It may not cover all possible information. If you have questions about this medicine, talk to your doctor, pharmacist, or health care provider.     © 2016, Elsevier/Gold Standard. (2015-07-06 09:14:54)          Metronidazole tablets or capsules  What is this medicine?  METRONIDAZOLE (me troe NI da zole) is an antiinfective. It is used to treat certain kinds of bacterial and protozoal infections. It will not work for colds, flu, or other viral infections.  This medicine may be used for other purposes; ask your health care provider or pharmacist if you have questions.  What should I tell my health care provider before I take this medicine?  They need to know if you have any of these conditions:  -anemia or other blood disorders  -disease of the nervous system  -fungal or yeast infection  -if you drink alcohol containing drinks  -liver disease  -seizures  -an unusual or allergic reaction to metronidazole, or other medicines, foods, dyes, or preservatives  -pregnant or trying to get pregnant  -breast-feeding  How should I use this medicine?  Take this medicine by mouth with a full glass of water. Follow the directions on the prescription label. Take your medicine at regular intervals. Do not take your medicine more often than directed. Take all of your medicine as directed even if you think you are better. Do not skip doses or stop your medicine early.  Talk to your pediatrician regarding the use of this medicine in children. Special care may be needed.  Overdosage: If you think you  have taken too much of this medicine contact a poison control center or emergency room at once.  NOTE: This medicine is only for you. Do not share this medicine with others.  What if I miss a dose?  If you miss a dose, take it as soon as you can. If it is almost time for your next dose, take only that dose. Do not take double or extra doses.  What may interact with this medicine?  Do not take this medicine with any of the following medications:  -alcohol or any product that contains alcohol  -amprenavir oral solution  -cisapride  -disulfiram  -dofetilide  -dronedarone  -paclitaxel injection  -pimozide  -ritonavir oral solution  -sertraline oral solution  -sulfamethoxazole-trimethoprim injection  -thioridazine  -ziprasidone  This medicine may also interact with the following medications:  -birth control pills  -cimetidine  -lithium  -other medicines that prolong the QT interval (cause an abnormal heart rhythm)  -phenobarbital  -phenytoin  -warfarin  This list may not describe all possible interactions. Give your health care provider a list of all the medicines, herbs, non-prescription drugs, or dietary supplements you use. Also tell them if you smoke, drink alcohol, or use illegal drugs. Some items may interact with your medicine.  What should I watch for while using this medicine?  Tell your doctor or health care professional if your symptoms do not improve or if they get worse.  You may get drowsy or dizzy. Do not drive, use machinery, or do anything that needs mental alertness until you know how this medicine affects you. Do not stand or sit up quickly, especially if you are an older patient. This reduces the risk of dizzy or fainting spells.  Avoid alcoholic drinks while you are taking this medicine and for three days afterward. Alcohol may make you feel dizzy, sick, or flushed.  If you are being treated for a sexually transmitted disease, avoid sexual contact until you have finished your treatment. Your sexual  partner may also need treatment.  What side effects may I notice from receiving this medicine?  Side effects that you should report to your doctor or health care professional as soon as possible:  -allergic reactions like skin rash or hives, swelling of the face, lips, or tongue  -confusion, clumsiness  -difficulty speaking  -discolored or sore mouth  -dizziness  -fever, infection  -numbness, tingling, pain or weakness in the hands or feet  -trouble passing urine or change in the amount of urine  -redness, blistering, peeling or loosening of the skin, including inside the mouth  -seizures  -unusually weak or tired  -vaginal irritation, dryness, or discharge  Side effects that usually do not require medical attention (report to your doctor or health care professional if they continue or are bothersome):  -diarrhea  -headache  -irritability  -metallic taste  -nausea  -stomach pain or cramps  -trouble sleeping  This list may not describe all possible side effects. Call your doctor for medical advice about side effects. You may report side effects to FDA at 2-754-FDA-5651.  Where should I keep my medicine?  Keep out of the reach of children.  Store at room temperature below 25 degrees C (77 degrees F). Protect from light. Keep container tightly closed. Throw away any unused medicine after the expiration date.  NOTE: This sheet is a summary. It may not cover all possible information. If you have questions about this medicine, talk to your doctor, pharmacist, or health care provider.     © 2016, Elsevier/Gold Standard. (2014-07-25 14:08:39)            SYMPTOMS OF A STROKE    Call 911 or have someone take you to the Emergency Department if you have any of the following:    · Sudden numbness or weakness of your face, arm or leg especially on one side of the body  · Sudden confusion, diffiiculty speaking or trouble understanding   · Changes in your vision or loss of sight in one eye  · Sudden severe headache with no known  cause  · sudden dizziness, trouble walking, loss of balance or coordination    It is important to seek emergency care right away if you have further stroke symptoms. If you get emergency help quickly, the powerful clot-dissolving medicines can reduce the disabilities caused by a stroke.     For more information:    American Stroke Association  2-361-1-STROKE  www.strokeassociation.org           IF YOU SMOKE OR USE TOBACCO PLEASE READ THE FOLLOWING:    Why is smoking bad for me?  Smoking increases the risk of heart disease, lung disease, vascular disease, stroke, and cancer.     If you smoke, STOP!    If you would like more information on quitting smoking, please visit the [employer] website: [email or web address]/[place]/healthier-[email or web address]   This link will provide additional resources including the QUIT line and the Beat the Pack support groups.     For more information:    American Cancer Society  ([phone] 629-9222    American Heart Association  3-013-162-8366               YOU ARE THE MOST IMPORTANT FACTOR IN YOUR RECOVERY.     Follow all instructions carefully.     I have reviewed my discharge instructions with my nurse, including the following information, if applicable:     Information about my illness and diagnosis   Follow up appointments (including lab draws)   Wound Care   Equipment Needs   Medications (new and continuing) along with side effects   Preventative information such as vaccines and smoking cessations   Diet   Pain   I know when to contact my Doctor's office or seek emergency care      I want my nurse to describe the side effects of my medications: YES NO   If the answer is no, I understand the side effects of my medications: YES NO   My nurse described the side effects of my medications in a way that I could understand: YES NO   I have taken my personal belongings and my own medications with me at discharge: YES NO            I have received this information and my questions  have been answered. I have discussed any concerns I see with this plan with the nurse or physician. I understand these instructions.    Signature of Patient or Responsible Person: _____________________________________    Date: _________________  Time: __________________    Signature of Healthcare Provider: _______________________________________  Date: _________________  Time: __________________

## 2017-01-08 NOTE — Clinical Note
Level of Care: Med/Surg [1]   Admitting Physician: PABLO CASE [[id number]]   Attending Physician: PABLO CASE [[id number]]

## 2017-01-09 LAB
BH CV ECHO MEAS - BSA(HAYCOCK): 2.7 M^2
BH CV ECHO MEAS - BSA: 2.4 M^2
BH CV ECHO MEAS - BZI_BMI: 46.4 KILOGRAMS/M^2
BH CV ECHO MEAS - BZI_METRIC_HEIGHT: 172.7 CM
BH CV ECHO MEAS - BZI_METRIC_WEIGHT: 138.3 KG
BH CV LOWER VASCULAR LEFT COMMON FEMORAL AUGMENT: NORMAL
BH CV LOWER VASCULAR LEFT COMMON FEMORAL COMPETENT: NORMAL
BH CV LOWER VASCULAR LEFT COMMON FEMORAL COMPRESS: NORMAL
BH CV LOWER VASCULAR LEFT COMMON FEMORAL PHASIC: NORMAL
BH CV LOWER VASCULAR LEFT COMMON FEMORAL SPONT: NORMAL
BH CV LOWER VASCULAR LEFT DISTAL FEMORAL COMPRESS: NORMAL
BH CV LOWER VASCULAR LEFT GASTRONEMIUS COMPRESS: NORMAL
BH CV LOWER VASCULAR LEFT GREATER SAPH AK COMPRESS: NORMAL
BH CV LOWER VASCULAR LEFT GREATER SAPH BK COMPRESS: NORMAL
BH CV LOWER VASCULAR LEFT LESSER SAPH COMPRESS: NORMAL
BH CV LOWER VASCULAR LEFT MID FEMORAL AUGMENT: NORMAL
BH CV LOWER VASCULAR LEFT MID FEMORAL COMPETENT: NORMAL
BH CV LOWER VASCULAR LEFT MID FEMORAL COMPRESS: NORMAL
BH CV LOWER VASCULAR LEFT MID FEMORAL PHASIC: NORMAL
BH CV LOWER VASCULAR LEFT MID FEMORAL SPONT: NORMAL
BH CV LOWER VASCULAR LEFT POPLITEAL AUGMENT: NORMAL
BH CV LOWER VASCULAR LEFT POPLITEAL COMPETENT: NORMAL
BH CV LOWER VASCULAR LEFT POPLITEAL COMPRESS: NORMAL
BH CV LOWER VASCULAR LEFT POPLITEAL PHASIC: NORMAL
BH CV LOWER VASCULAR LEFT POPLITEAL SPONT: NORMAL
BH CV LOWER VASCULAR LEFT POSTERIOR TIBIAL COMPRESS: NORMAL
BH CV LOWER VASCULAR LEFT PROFUNDA FEMORAL AUGMENT: NORMAL
BH CV LOWER VASCULAR LEFT PROFUNDA FEMORAL COMPETENT: NORMAL
BH CV LOWER VASCULAR LEFT PROFUNDA FEMORAL COMPRESS: NORMAL
BH CV LOWER VASCULAR LEFT PROFUNDA FEMORAL PHASIC: NORMAL
BH CV LOWER VASCULAR LEFT PROFUNDA FEMORAL SPONT: NORMAL
BH CV LOWER VASCULAR LEFT PROXIMAL FEMORAL COMPRESS: NORMAL
BH CV LOWER VASCULAR LEFT SAPHENOFEMORAL JUNCTION AUGMENT: NORMAL
BH CV LOWER VASCULAR LEFT SAPHENOFEMORAL JUNCTION COMPETENT: NORMAL
BH CV LOWER VASCULAR LEFT SAPHENOFEMORAL JUNCTION COMPRESS: NORMAL
BH CV LOWER VASCULAR LEFT SAPHENOFEMORAL JUNCTION PHASIC: NORMAL
BH CV LOWER VASCULAR LEFT SAPHENOFEMORAL JUNCTION SPONT: NORMAL
BH CV LOWER VASCULAR RIGHT COMMON FEMORAL AUGMENT: NORMAL
BH CV LOWER VASCULAR RIGHT COMMON FEMORAL COMPETENT: NORMAL
BH CV LOWER VASCULAR RIGHT COMMON FEMORAL COMPRESS: NORMAL
BH CV LOWER VASCULAR RIGHT COMMON FEMORAL PHASIC: NORMAL
BH CV LOWER VASCULAR RIGHT COMMON FEMORAL SPONT: NORMAL
BH CV LOWER VASCULAR RIGHT DISTAL FEMORAL COMPRESS: NORMAL
BH CV LOWER VASCULAR RIGHT GASTRONEMIUS COMPRESS: NORMAL
BH CV LOWER VASCULAR RIGHT GREATER SAPH AK COMPRESS: NORMAL
BH CV LOWER VASCULAR RIGHT GREATER SAPH BK COMPRESS: NORMAL
BH CV LOWER VASCULAR RIGHT LESSER SAPH COMPRESS: NORMAL
BH CV LOWER VASCULAR RIGHT MID FEMORAL AUGMENT: NORMAL
BH CV LOWER VASCULAR RIGHT MID FEMORAL COMPETENT: NORMAL
BH CV LOWER VASCULAR RIGHT MID FEMORAL COMPRESS: NORMAL
BH CV LOWER VASCULAR RIGHT MID FEMORAL PHASIC: NORMAL
BH CV LOWER VASCULAR RIGHT MID FEMORAL SPONT: NORMAL
BH CV LOWER VASCULAR RIGHT POPLITEAL AUGMENT: NORMAL
BH CV LOWER VASCULAR RIGHT POPLITEAL COMPETENT: NORMAL
BH CV LOWER VASCULAR RIGHT POPLITEAL COMPRESS: NORMAL
BH CV LOWER VASCULAR RIGHT POPLITEAL PHASIC: NORMAL
BH CV LOWER VASCULAR RIGHT POPLITEAL SPONT: NORMAL
BH CV LOWER VASCULAR RIGHT POSTERIOR TIBIAL COMPRESS: NORMAL
BH CV LOWER VASCULAR RIGHT PROFUNDA FEMORAL COMPRESS: NORMAL
BH CV LOWER VASCULAR RIGHT PROXIMAL FEMORAL COMPRESS: NORMAL
BH CV LOWER VASCULAR RIGHT SAPHENOFEMORAL JUNCTION AUGMENT: NORMAL
BH CV LOWER VASCULAR RIGHT SAPHENOFEMORAL JUNCTION COMPETENT: NORMAL
BH CV LOWER VASCULAR RIGHT SAPHENOFEMORAL JUNCTION COMPRESS: NORMAL
BH CV LOWER VASCULAR RIGHT SAPHENOFEMORAL JUNCTION PHASIC: NORMAL
BH CV LOWER VASCULAR RIGHT SAPHENOFEMORAL JUNCTION SPONT: NORMAL
GLUCOSE BLDC GLUCOMTR-MCNC: 253 MG/DL (ref 70–130)
GLUCOSE BLDC GLUCOMTR-MCNC: 289 MG/DL (ref 70–130)
GLUCOSE BLDC GLUCOMTR-MCNC: 315 MG/DL (ref 70–130)
GLUCOSE BLDC GLUCOMTR-MCNC: 344 MG/DL (ref 70–130)

## 2017-01-09 PROCEDURE — 99233 SBSQ HOSP IP/OBS HIGH 50: CPT | Performed by: INTERNAL MEDICINE

## 2017-01-09 PROCEDURE — 25010000002 VANCOMYCIN 1750 MG/500ML SOLUTION

## 2017-01-09 PROCEDURE — 63710000001 INSULIN DETEMIR PER 5 UNITS: Performed by: INTERNAL MEDICINE

## 2017-01-09 PROCEDURE — 25010000002 HEPARIN (PORCINE) PER 1000 UNITS: Performed by: INTERNAL MEDICINE

## 2017-01-09 PROCEDURE — 63710000001 INSULIN LISPRO (HUMAN) PER 5 UNITS: Performed by: INTERNAL MEDICINE

## 2017-01-09 PROCEDURE — 82962 GLUCOSE BLOOD TEST: CPT

## 2017-01-09 RX ADMIN — FAMOTIDINE 20 MG: 20 TABLET ORAL at 18:04

## 2017-01-09 RX ADMIN — INSULIN LISPRO 12 UNITS: 100 INJECTION, SOLUTION INTRAVENOUS; SUBCUTANEOUS at 13:18

## 2017-01-09 RX ADMIN — HEPARIN SODIUM 5000 UNITS: 5000 INJECTION, SOLUTION INTRAVENOUS; SUBCUTANEOUS at 08:02

## 2017-01-09 RX ADMIN — INSULIN LISPRO 10 UNITS: 100 INJECTION, SOLUTION INTRAVENOUS; SUBCUTANEOUS at 18:04

## 2017-01-09 RX ADMIN — INSULIN LISPRO 12 UNITS: 100 INJECTION, SOLUTION INTRAVENOUS; SUBCUTANEOUS at 18:04

## 2017-01-09 RX ADMIN — INSULIN LISPRO 8 UNITS: 100 INJECTION, SOLUTION INTRAVENOUS; SUBCUTANEOUS at 13:17

## 2017-01-09 RX ADMIN — ASPIRIN 81 MG 81 MG: 81 TABLET ORAL at 08:02

## 2017-01-09 RX ADMIN — ATORVASTATIN CALCIUM 20 MG: 20 TABLET, FILM COATED ORAL at 19:57

## 2017-01-09 RX ADMIN — INSULIN LISPRO 10 UNITS: 100 INJECTION, SOLUTION INTRAVENOUS; SUBCUTANEOUS at 21:25

## 2017-01-09 RX ADMIN — CLOPIDOGREL 75 MG: 75 TABLET, FILM COATED ORAL at 08:02

## 2017-01-09 RX ADMIN — LEVOTHYROXINE SODIUM 112 MCG: 112 TABLET ORAL at 06:03

## 2017-01-09 RX ADMIN — Medication 1750 MG: at 06:04

## 2017-01-09 RX ADMIN — INSULIN DETEMIR 25 UNITS: 100 INJECTION, SOLUTION SUBCUTANEOUS at 08:00

## 2017-01-09 RX ADMIN — INSULIN DETEMIR 35 UNITS: 100 INJECTION, SOLUTION SUBCUTANEOUS at 19:56

## 2017-01-09 RX ADMIN — DOCUSATE SODIUM 100 MG: 100 CAPSULE, LIQUID FILLED ORAL at 18:04

## 2017-01-09 RX ADMIN — HEPARIN SODIUM 5000 UNITS: 5000 INJECTION, SOLUTION INTRAVENOUS; SUBCUTANEOUS at 19:57

## 2017-01-09 RX ADMIN — FAMOTIDINE 20 MG: 20 TABLET ORAL at 08:01

## 2017-01-09 RX ADMIN — INSULIN LISPRO 8 UNITS: 100 INJECTION, SOLUTION INTRAVENOUS; SUBCUTANEOUS at 08:02

## 2017-01-09 RX ADMIN — ERTAPENEM SODIUM 1 G: 1 INJECTION, POWDER, LYOPHILIZED, FOR SOLUTION INTRAMUSCULAR; INTRAVENOUS at 08:01

## 2017-01-09 RX ADMIN — DOCUSATE SODIUM 100 MG: 100 CAPSULE, LIQUID FILLED ORAL at 08:01

## 2017-01-09 NOTE — PROGRESS NOTES
"Adult Nutrition  Assessment/PES    Patient Name:  Nuris Ribeiro  YOB: 1950  MRN: [id number]  Admit Date:  1/8/2017    Assessment Date:  1/9/2017        Reason for Assessment       01/09/17 1459    Reason for Assessment    Reason For Assessment/Visit diagnosis/disease state   RLE cellulitis    Time Spent (min) 20    Diagnosis Diagnosis    Cardiac CAD;HTN    Endocrine DM Type 2;Hypothyroid    Renal CKD    Skin Cellulitis   RLE              Nutrition/Diet History       01/09/17 1502    Nutrition/Diet History    Reported/Observed By Patient    Other Pt states appetite has been great and eating all of meals            Anthropometrics       01/09/17 1503    Anthropometrics    Height 172.7 cm (68\")    Weight (!)  138 kg (304 lb 3.8 oz)    Ideal Body Weight (IBW)    Ideal Body Weight (IBW), Female 64.55    % Ideal Body Weight 214.24    Body Mass Index (BMI)    BMI (kg/m2) 46.36            Labs/Tests/Procedures/Meds       01/09/17 1504    Labs/Tests/Procedures/Meds    Labs/Tests Review Reviewed                Nutrition Prescription Ordered       01/09/17 1504    Nutrition Prescription PO    Current PO Diet Regular    Common Modifiers Cardiac;Consistent Carbohydrate            Evaluation of Received Nutrient/Fluid Intake       01/09/17 1504    PO Evaluation    Number of Meals 5    % PO Intake 95              Problem/Interventions:        Problem 1       01/09/17 1504    Nutrition Diagnoses Problem 1    Problem 1 Nutrition Appropriate for Condition at this Time    Etiology (related to) --   Clinical condition    Signs/Symptoms (evidenced by) PO Intake    Percent (%) intake recorded 95 %    Over number of meals 5                    Intervention Goal       01/09/17 1510    Intervention Goal    General Nutrition support treatment    PO Maintain intake            Nutrition Intervention       01/09/17 1511    Nutrition Intervention    RD/Tech Action Encourage intake;Follow Tx progress            "   Education/Evaluation       01/09/17 1511    Monitor/Evaluation    Monitor Per protocol;PO intake        Comments:      Electronically signed by:  Arabella Crump  01/09/17 3:12 PM

## 2017-01-09 NOTE — PROGRESS NOTES
[facility] Medicine Services  INPATIENT PROGRESS NOTE    Date of Admission: 1/8/2017  Length of Stay: 1  Primary Care Physician: No Known Provider    Subjective     Chief Complaint: RLE cellulitis  HPI:  Feels a little better today.  Right leg remains swollen, maybe a little less red.    Would like to see  for assistance with paying for insulin.  Due to cost, admits she hasn't been taking like she should.    Review Of Systems:   Review of Systems   Constitutional: Negative for fever.   Cardiovascular: Positive for leg swelling. Negative for chest pain.   Gastrointestinal: Negative for abdominal pain.   All other systems reviewed and are negative.      Objective      Temp:  [97.2 °F (36.2 °C)-97.9 °F (36.6 °C)] 97.9 °F (36.6 °C)  Heart Rate:  [60-63] 60  Resp:  [16] 16  BP: (121-132)/(62-78) 126/78  Physical Exam   Constitutional: She is oriented to person, place, and time. She appears well-developed and well-nourished.   HENT:   Head: Normocephalic and atraumatic.   Eyes: Pupils are equal, round, and reactive to light.   Cardiovascular: Normal rate, regular rhythm and normal heart sounds.    Pulmonary/Chest: Effort normal and breath sounds normal.   Abdominal: Soft. Bowel sounds are normal.   obese   Musculoskeletal:   Right leg remain swollen, erythema maybe a little faded compared to yesterday   Neurological: She is alert and oriented to person, place, and time.   No focal decifits   Skin: Skin is warm and dry.   Psychiatric: She has a normal mood and affect.   Vitals reviewed.    Results Review:    I have reviewed the labs, radiology results and diagnostic studies.      Results from last 7 days  Lab Units 01/08/17  0248   WBC 10*3/mm3 14.92*   HEMOGLOBIN g/dL 13.6   PLATELETS 10*3/mm3 448       Results from last 7 days  Lab Units 01/08/17  0248   SODIUM mmol/L 136   POTASSIUM mmol/L 4.7   TOTAL CO2 mmol/L 24.0   CREATININE mg/dL 1.50*   GLUCOSE mg/dL 141*       Culture  Data:   BLOOD CULTURE   Date Value Ref Range Status   01/08/2017 No growth at 24 hours  Preliminary   01/08/2017 No growth at 24 hours  Preliminary     URINE CULTURE   Date Value Ref Range Status   01/08/2017 Culture in progress  Preliminary     Radiology Data:   LE duplex - pre-love negative for DVT    I have reviewed the medications.    Assessment/Plan     Assessment/Problem List  Principal Problem:    Cellulitis of right lower extremity without foot  Active Problems:    Morbid obesity due to excess calories    Diabetes type 2, uncontrolled    CAD (coronary artery disease)    HTN (hypertension)    Hypothyroid    CKD stage 3 due to type 2 diabetes mellitus    Plan  - continue current abx.  D/w Dr. Garza in [place], plan will be to watch overnight and d/c home in AM to continue IV abx in office.  - will adjust basal/bolus insulin for better control  - a1c=9.6%, admits not taking insulin as prescribed due to cost.  Asks  to see what assistance there is.  Can change her to lantus if it will help.  Will also ask DM educator to see, needs to lose weight.  - continue other home meds.  - d/w patient, plan home AM    DVT prophylaxis: heparin    Boby Sanchez MD   01/09/17   11:11 AM    Please note that portions of this note may have been completed with a voice recognition program. Efforts were made to edit the dictations, but occasionally words are mistranscribed.

## 2017-01-09 NOTE — CONSULTS
" Discussed and taught patient about type 2 diabetes self-management, risk factors, and importance of blood glucose control to reduce complications. She said she has had diabetes for a \"long time.\" Target blood glucose readings and A1c goals per ADA were reviewed. A1c was 9.6%. Patient does not check her BG at home. We discussed the importance of checking BG. Reviewed with patient current A1c and discussed its significance. Signs, symptoms and treatment of hyperglycemia and hypoglycemia were discussed. Lifestyle changes such as physical activity with MD approval and healthy eating were encouraged. Patient stated she did not like physical activity because her hip hurt after 5 minutes. We discussed water aerobics. She stated she was interested in bariatric surgery. She does see Janene and Donny for her diabetes. She was recently seen in his office.  She is supposed to take Humalog 35 units with breakfast and lunch and 70 units with dinner. She also takes Levemir 100 units in the morning and 120 units in the evening. However, she has not been taking her insulin as prescribed due to cost. She recently changed to Medicare and the cost of insulin has increased . She was provided pens from her doctor's office but tries to conserve the amount by taking decreased dosages. She said she was seem by case management and they are helping her. She has a meter at home and thinks she has a prescription for supplies but is not certain. She does know how to obtain a new prescription.   "

## 2017-01-09 NOTE — PLAN OF CARE
Problem: Patient Care Overview (Adult)  Goal: Plan of Care Review  Outcome: Ongoing (interventions implemented as appropriate)    01/09/17 0358   Coping/Psychosocial Response Interventions   Plan Of Care Reviewed With patient   Patient Care Overview   Progress improving   Outcome Evaluation   Outcome Summary/Follow up Plan Patient has sufficient pain control. Edema and tendernes remain in the right leg and ankle remain, acompanies by nubmbess and tenderness.         Problem: Pain, Acute (Adult)  Goal: Identify Related Risk Factors and Signs and Symptoms  Outcome: Ongoing (interventions implemented as appropriate)

## 2017-01-09 NOTE — PROGRESS NOTES
"Continued Stay Note  [facility]     Patient Name: Nuris Ribeiro  MRN: [id number]  Today's Date: 1/9/2017    Admit Date: 1/8/2017          Discharge Plan       01/09/17 1403    Case Management/Social Work Plan    Plan Social work spoke with Mrs. Ribeiro about her insulin cost.  She said that she has [employer] Medicare Part D prescription coverage.  She said that she has been having very high insulin drug drugs.  She was in the coverage gap or as the patient explained the \"donut home\"  Social work discussed with the patient about Extra Help for Medicare and the patient is over the income for Extra Help.  Social work provided a [facility]              Discharge Codes     None            HARJIT Luna    "

## 2017-01-09 NOTE — PLAN OF CARE
Problem: Patient Care Overview (Adult)  Goal: Plan of Care Review  Outcome: Ongoing (interventions implemented as appropriate)    01/09/17 1612   Coping/Psychosocial Response Interventions   Plan Of Care Reviewed With patient   Patient Care Overview   Progress improving       Goal: Adult Individualization and Mutuality  Outcome: Ongoing (interventions implemented as appropriate)  Goal: Discharge Needs Assessment  Outcome: Ongoing (interventions implemented as appropriate)    Problem: Pain, Acute (Adult)  Goal: Identify Related Risk Factors and Signs and Symptoms  Outcome: Ongoing (interventions implemented as appropriate)    Problem: Diabetes, Type 2 (Adult)  Goal: Signs and Symptoms of Listed Potential Problems Will be Absent or Manageable (Diabetes, Type 2)  Outcome: Outcome(s) achieved Date Met:  01/09/17 01/09/17 1612   Diabetes, Type 2   Problems Assessed (Type 2 Diabetes) all   Problems Present (Type 2 Diabetes) impaired glycemic control

## 2017-01-09 NOTE — CONSULTS
"Nuris Ribeiro  1950  [id number]  1/9/2017      Referring Provider: Boby Sanchez M.D. Hospital medicine  Reason for Consultation: Right lower extremity cellulitis      Subjective   History of present illness:  Pleasant 66-year-old  female from [facility].  Morbid obesity, type 2 diabetes mellitus with inadequate glycemic control, and peripheral neuropathy.  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  Status post fifth ray resection by Dr. Elias Yoder.  One month ago the patient was trimming a cuticle involving her left fourth toe.  He believes that she \"cut too deeply\".  The nail matrix involving the fourth digit subsequently sloughed off.  She returned to see her podiatrist who completed her nail debridement without complication.  72 hours ago she became aware of erythematous changes and soft tissue swelling involving the right fourth digit in addition to an extensive cellulitis from the ankle joint to the right tibial plateau.  She did not measure her temperature at home.  She did experience some chilling and a single night sweat.  She freely confesses to not monitoring her blood sugars and her hemoglobin A1c in the emergency department was 9.6 g.  Peripheral leukocyte count was elevated to 14,900 with a C-reactive protein of 89.  Blood cultures ×1 are negative at 24 hours.  The patient was started on intravenous vancomycin and Invanz by Hospital medicine.  Asked to assist with antimicrobial therapy and diagnostic evaluation in this context.    Past Medical History   Diagnosis Date   • CKD (chronic kidney disease)    • Coronary artery disease    • Diabetes mellitus    • Dyslipidemia    • Gangrene    • Hypertension    • Hypothyroidism        Past Surgical History   Procedure Laterality Date   • Tonsillectomy     • Foot surgery Right 12/11/2015     Right 5Th Toe Amputation   • Coronary angioplasty with stent placement     • Toe amputation         Pediatric History " "  Patient Guardian Status   • Not on file.     Other Topics Concern   • Not on file     Social History Narrative   • No narrative on file       family history includes Coronary artery disease in her mother; Diabetes in her sister; Hypertension in her mother; Leukemia in her father; Macular degeneration in her mother; Other in her mother.    Allergies   Allergen Reactions   • Amoxicillin        Medication: MAR reviewed.  Antibiotics: See below.  IV Anti-Infectives     Ordered     Dose/Rate Route Frequency Start Stop    01/08/17 0545  vancomycin IVPB 1750 mg in 0.9% Sodium Chloride (premix) 500 mL     Ordering Provider:  Jeronimo Braun RPH    12 mg/kg × 138 kg  over 120 Minutes Intravenous Every 24 Hours 01/09/17 0600      01/08/17 0545  Pharmacy to dose vancomycin     Ordering Provider:  Jeronimo Braun RPH     Does not apply Continuous PRN 01/08/17 0544      01/08/17 0449  ertapenem (INVanz) 1 g/100 mL 0.9% NS VTB (mbp)     Ordering Provider:  Opal Villafuerte MD    1 g  over 30 Minutes Intravenous Every 24 Hours 01/08/17 0451      01/08/17 0229  vancomycin 2000 mg/500 mL 0.9% NS IVPB ([facility])     Ordering Provider:  Donaldo Gonzalez MD    2,000 mg Intravenous Once 01/08/17 0231 01/08/17 0327          Please refer to the medical record for a full medication list    Review of Systems  Pertinent items are noted in HPI, all other systems reviewed and negative.  Adequate tetanus immunization from prior hospitalization    Objective     Physical Exam: See below.  Vital Signs   Temp:  [97.2 °F (36.2 °C)-97.9 °F (36.6 °C)] 97.9 °F (36.6 °C)  Heart Rate:  [60-63] 60  Resp:  [16] 16  BP: (121-132)/(62-78) 126/78    Blood pressure 126/78, pulse 60, temperature 97.9 °F (36.6 °C), temperature source Oral, resp. rate 16, height 68\" (172.7 cm), weight (!) 305 lb (138 kg), SpO2 96 %.  GENERAL: Awake and alert, in no acute distress.  Recalls me from previous care.  Morbid obesity.  HEENT: Oropharynx without thrush. Sinuses nontender. " "Dentition in good repair. No cervical adenopathy. No carotid bruits/ jugular venous distention.   EYES: PERRL. No conjunctival injection. No icterus. EOMI.  LYMPHATICS: No lymphadenopathy of the neck or axillary or inguinal regions.   HEART: No murmur, gallop, or pericardial friction rub.   LUNGS: Clear to auscultation anteriorly. No percussion dullness.   ABDOMEN: Soft, nontender, nondistended. No appreciable HSM.  Rotund abdomen without peritoneal findings.  SKIN: Warm and dry without cutaneous eruptions. No embolic stigmata.  The fifth ray has been surgically resected.  There is a \"sausage digit\" involving the right fourth toe.  The nail matrix as previously sloughed.  Significant blanching erythema is noted from the ankle joint to the tibial plateau.  There is no crepitant gas in the soft tissues.  Sensation is diminished to light touch in the right foot.  The dorsalis pedis pulses easily palpable.  There are multiple small abrasions.  PSYCHIATRIC: Mental status lucid. Cranial nerve function intact.       Results Review:   I reviewed the patient's new clinical results.  I reviewed the patient's new imaging results and agree with the interpretation.    Lab Results   Component Value Date    WBC 14.92 (H) 01/08/2017    HGB 13.6 01/08/2017    HCT 41.8 01/08/2017    MCV 88.6 01/08/2017     01/08/2017       Lab Results   Component Value Date    GLUCOSE 141 (H) 01/08/2017    BUN 24 (H) 01/08/2017    CREATININE 1.50 (H) 01/08/2017    EGFRIFNONA 35 (L) 01/08/2017    BCR 16.0 01/08/2017    CO2 24.0 01/08/2017    CALCIUM 9.5 01/08/2017    ALBUMIN 3.70 01/08/2017    LABIL2 1.0 (L) 01/08/2017    AST 11 01/08/2017    ALT 10 01/08/2017       Estimated Creatinine Clearance: 55.2 mL/min (by C-G formula based on Cr of 1.5).      Microbiology: Blood cultures ×1 are negative at 24 hours of incubation.      Radiology:  Imaging Results (last 72 hours)     Procedure Component Value Units Date/Time    CT Lower Extremity Right " Without Contrast [[id number]]  (Abnormal) Collected:  01/08/17 0459     Updated:  01/08/17 0700    Narrative:       EXAM:    CT Right Lower Extremity Without Intravenous Contrast.    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 Ext   cellulitis, please include the foot also    TECHNIQUE:    Axial computed tomography images of the right lower extremity without   intravenous contrast.  This CT exam was performed using one or more of the   following dose reduction techniques:  automated exposure control, adjustment of   the mA and/or kV according to patient size, and/or use of iterative   reconstruction technique.    Coronal and sagittal reformatted images were created and reviewed.    COMPARISON:    CT LOWER EXTREM WO CONTRA 12/10/2015 3:49:18 PM    FINDINGS:    Bones:  There has been interval amputation of the fifth digit at the level of   the distal metatarsal.  No definite osseous erosion or periosteal reaction   appreciated to suggest osteomyelitis.EXAM:    CT Right Lower Extremity Without Intravenous Contrast.    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 Ext    cellulitis, please include the foot also    TECHNIQUE:    Axial computed tomography images of the right lower extremity without   intravenous contrast.  This CT exam was performed using one or more of the   following dose reduction techniques:  automated exposure control, adjustment of   the mA and/or kV according to patient size, and/or use of iterative   reconstruction technique.    Coronal and sagittal reformatted images were created and reviewed.    COMPARISON:    CT LOWER EXTREM WO CONTRA 12/10/2015 3:49:18 PM    FINDINGS:    Bones:  There has been interval indentation of the fifth digit the level of   the distal metatarsal.  No osseous erosion or periosteal reaction appreciated   to suggest osteomyelitis. No acute fracture visualized.      Joints:  There are hypertrophic degenerative changes at the level of the mid   foot.  No dislocation.  No significant joint effusion visualized.    Soft tissues:  Mild subcutaneous edema about the anterolateral aspect of the   knee and proximal calf.  Beginning at the level of the distal calf there is   more circumferential subcutaneous edema which extends into the ankle and foot.    Associated skin thickening.  There is a tiny focus of soft tissue air within   the distal aspect of the fourth digit.  Otherwise, no soft tissue air   visualized. There is no discrete fluid collection appreciated on this   noncontrast exam to suggest abscess.  There is no obvious extension of edema   along with the deep fascial planes to suggest significant fasciitis.    Subcutaneous calcifications again noted.  Compared to the prior exam, there has   been some interval improvement in subcutaneous edema at the level of ankle and   foot.  Otherwise, these findings are not significantly changed.  There is also   osseous spurring along the plantar aspect of the calcaneus, at the origin of   the plantar fascia.  Enthesopathy noted at the insertion of the Achilles   tendon.      Vasculature:   Atherosclerotic calcifications.  Venous varicosities noted.      Impression:       1.  Diffuse subcutaneous edema.  No discrete abscess appreciated on this   noncontrast exam.  Compared to the prior study, there has been some interval   improvement of edema at the level of the ankle and foot.  2.  Tiny focus of air in the fourth digit.  This could be related to an   overlying skin defect.  Recommend clinical correlation.  3.  No CT evidence of osteomyelitis.    THIS DOCUMENT HAS BEEN ELECTRONICALLY SIGNED BY VERNELL BUENO MD          ASSESSMENT AND PLAN: Acute right lower extremity cellulitis.  Leukocytosis to 14,900.  Elevated inflammatory markers.  Digital erythema and soft tissue swelling involving the right fourth toe.  Punctate gas involving the right fourth digit. Previous history of gas gangrene with right 5th ray resection.  Type 2 diabetes mellitus with insulin requirement.  Elevated hemoglobin A1c at 9.6 g.  Active urine sediment with 6-12 WBCs per high-power field.  Chronic kidney disease stage 2/3. Baseline plasma creatinine 1.5.  The patient's maximum temperature over 24 hours 97.9°.  Blood cultures ×1 are unremarkable.  Urine culture is pending.  CT scan of the right lower extremity is reviewed.  The patient clinically does not appear to be as toxic as one would anticipate with a recurrent episode of gas gangrene in the lower extremity.  I suspect that the punctate gas was introduced into the right fourth toe at the time the nail matrix sloughed off.  There are no obvious osteolytic changes.  The patient currently is receiving vancomycin and ertapenem.  Utilization management: I believe she can be D escalated to once daily ceftriaxone and oral metronidazole.  We will plan for tentative discharge on Tuesday 1/10 with daily infusion in our office.  I will plan to see her back  in follow-up in one week.  I would avoid a PICC catheter given the patient's current renal dysfunction, insulin-dependent diabetes  mellitus, and possibility of hemodialysis requirement in the future with fistula stenosis risk.       I discussed the patients findings and my recommendations with patient    Thank you for this consult.  Our group would be pleased to follow this patient over the course of their hospitalization and assist with outpatient antimicrobial therapy, as indicated.     Pa Garza MD  1/9/2017

## 2017-01-10 VITALS
HEART RATE: 60 BPM | BODY MASS INDEX: 44.41 KG/M2 | HEIGHT: 68 IN | SYSTOLIC BLOOD PRESSURE: 144 MMHG | TEMPERATURE: 98.5 F | OXYGEN SATURATION: 94 % | RESPIRATION RATE: 16 BRPM | WEIGHT: 293 LBS | DIASTOLIC BLOOD PRESSURE: 76 MMHG

## 2017-01-10 LAB
BACTERIA SPEC AEROBE CULT: NORMAL
GLUCOSE BLDC GLUCOMTR-MCNC: 240 MG/DL (ref 70–130)
GLUCOSE BLDC GLUCOMTR-MCNC: 244 MG/DL (ref 70–130)
GLUCOSE BLDC GLUCOMTR-MCNC: 314 MG/DL (ref 70–130)
VANCOMYCIN TROUGH SERPL-MCNC: 37 MCG/ML (ref 10–20)

## 2017-01-10 PROCEDURE — 82962 GLUCOSE BLOOD TEST: CPT

## 2017-01-10 PROCEDURE — 99239 HOSP IP/OBS DSCHRG MGMT >30: CPT | Performed by: INTERNAL MEDICINE

## 2017-01-10 PROCEDURE — 25010000002 VANCOMYCIN 1750 MG/500ML SOLUTION

## 2017-01-10 PROCEDURE — 63710000001 INSULIN DETEMIR PER 5 UNITS: Performed by: INTERNAL MEDICINE

## 2017-01-10 PROCEDURE — 80202 ASSAY OF VANCOMYCIN: CPT

## 2017-01-10 PROCEDURE — 25010000002 HEPARIN (PORCINE) PER 1000 UNITS: Performed by: INTERNAL MEDICINE

## 2017-01-10 RX ORDER — CEFTRIAXONE 2 G/1
2 INJECTION, POWDER, FOR SOLUTION INTRAMUSCULAR; INTRAVENOUS EVERY 24 HOURS
Status: DISCONTINUED | OUTPATIENT
Start: 2017-01-10 | End: 2017-05-23

## 2017-01-10 RX ORDER — METRONIDAZOLE 500 MG/1
500 TABLET ORAL 3 TIMES DAILY
Qty: 30 TABLET | Refills: 0 | Status: SHIPPED | OUTPATIENT
Start: 2017-01-10 | End: 2017-05-23

## 2017-01-10 RX ADMIN — DOCUSATE SODIUM 100 MG: 100 CAPSULE, LIQUID FILLED ORAL at 08:39

## 2017-01-10 RX ADMIN — Medication 1750 MG: at 05:32

## 2017-01-10 RX ADMIN — LEVOTHYROXINE SODIUM 112 MCG: 112 TABLET ORAL at 05:31

## 2017-01-10 RX ADMIN — CLOPIDOGREL 75 MG: 75 TABLET, FILM COATED ORAL at 08:39

## 2017-01-10 RX ADMIN — INSULIN LISPRO 12 UNITS: 100 INJECTION, SOLUTION INTRAVENOUS; SUBCUTANEOUS at 08:39

## 2017-01-10 RX ADMIN — INSULIN DETEMIR 35 UNITS: 100 INJECTION, SOLUTION SUBCUTANEOUS at 08:40

## 2017-01-10 RX ADMIN — HEPARIN SODIUM 5000 UNITS: 5000 INJECTION, SOLUTION INTRAVENOUS; SUBCUTANEOUS at 08:39

## 2017-01-10 RX ADMIN — ASPIRIN 81 MG 81 MG: 81 TABLET ORAL at 08:39

## 2017-01-10 RX ADMIN — INSULIN LISPRO 5 UNITS: 100 INJECTION, SOLUTION INTRAVENOUS; SUBCUTANEOUS at 08:39

## 2017-01-10 RX ADMIN — ERTAPENEM SODIUM 1 G: 1 INJECTION, POWDER, LYOPHILIZED, FOR SOLUTION INTRAMUSCULAR; INTRAVENOUS at 08:40

## 2017-01-10 RX ADMIN — FAMOTIDINE 20 MG: 20 TABLET ORAL at 08:39

## 2017-01-10 NOTE — PROGRESS NOTES
Nuris Ribeiro  1950  [id number]  1/10/2017    CC: Lower extremity pain, redness, and swelling    Nuris Ribeiro is a 66 y.o. 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         Past medical history:  Past Medical History   Diagnosis Date   • CKD (chronic kidney disease)    • Coronary artery disease    • Diabetes mellitus    • Dyslipidemia    • Gangrene    • Hypertension    • Hypothyroidism        Medications:   Current Facility-Administered Medications:   •  acetaminophen (TYLENOL) tablet 650 mg, 650 mg, Oral, Q6H PRN, Opal Villafuerte MD  •  aspirin chewable tablet 81 mg, 81 mg, Oral, Daily, Opal Villafuerte MD, 81 mg at 01/09/17 0802  •  atorvastatin (LIPITOR) tablet 20 mg, 20 mg, Oral, Nightly, Opal Villafuerte MD, 20 mg at 01/09/17 1957  •  clopidogrel (PLAVIX) tablet 75 mg, 75 mg, Oral, Daily, Opal Villafuerte MD, 75 mg at 01/09/17 0802  •  dextrose (D50W) solution 25 g, 25 g, Intravenous, PRN, Opal Villafuerte MD  •  dextrose (GLUTOSE) oral gel 15 g, 15 g, Oral, PRN, Opal Villafuerte MD  •  docusate sodium (COLACE) capsule 100 mg, 100 mg, Oral, BID, Opal Villafuerte MD, 100 mg at 01/09/17 1804  •  ertapenem (INVanz) 1 g/100 mL 0.9% NS VTB (mbp), 1 g, Intravenous, Q24H, Opal Villafuerte MD, Last Rate: 0 mL/hr at 01/08/17 1630, 1 g at 01/09/17 0801  •  famotidine (PEPCID) tablet 20 mg, 20 mg, Oral, BID, Opal Villafuerte MD, 20 mg at 01/09/17 1804  •  glucagon (GLUCAGEN) injection 1 mg, 1 mg, Subcutaneous, Once PRN, Opal Villafuerte MD  •  heparin (porcine) 5000 UNIT/ML injection 5,000 Units, 5,000 Units, Subcutaneous, Q12H, Opal Villafuerte MD, 5,000 Units at 01/09/17 1957  •  Influenza Vac Subunit Quad (FLUCELVAX) injection 0.5 mL, 0.5 mL, Intramuscular, During Hospitalization, Opal Villafuerte MD  •  insulin detemir (LEVEMIR) injection 35 Units, 35  Units, Subcutaneous, Q12H, Boby Sanchez MD, 35 Units at 01/09/17 1956  •  insulin lispro (humaLOG) injection 0-14 Units, 0-14 Units, Subcutaneous, 4x Daily With Meals & Nightly, Opal Villafuerte MD, 10 Units at 01/09/17 2125  •  insulin lispro (humaLOG) injection 12 Units, 12 Units, Subcutaneous, TID With Meals, Boby Sanchez MD, 12 Units at 01/09/17 1804  •  levothyroxine (SYNTHROID, LEVOTHROID) tablet 112 mcg, 112 mcg, Oral, Q AM, Opal Villafuerte MD, 112 mcg at 01/10/17 0531  •  ondansetron (ZOFRAN) tablet 4 mg, 4 mg, Oral, Q6H PRN **OR** ondansetron (ZOFRAN) injection 4 mg, 4 mg, Intravenous, Q6H PRN, Opal Villafuerte MD  •  Pharmacy to dose vancomycin, , Does not apply, Continuous PRN, Jeronimo Braun RPH  •  pneumococcal polysaccharide 23-valent (PNEUMOVAX-23) vaccine 0.5 mL, 0.5 mL, Intramuscular, During Hospitalization, Boby Sanchez MD  •  sodium chloride 0.9 % flush 1-10 mL, 1-10 mL, Intravenous, PRN, Opal Villafuerte MD  •  Insert peripheral IV, , , Once **AND** sodium chloride 0.9 % flush 10 mL, 10 mL, Intravenous, PRN, Donaldo Gonzalez MD  •  vancomycin IVPB 1750 mg in 0.9% Sodium Chloride (premix) 500 mL, 12 mg/kg, Intravenous, Q24H, Jeronimo Braun RPH, 1,750 mg at 01/10/17 0532  Antibiotics:  IV Anti-Infectives     Ordered     Dose/Rate Route Frequency Start Stop    01/08/17 0545  vancomycin IVPB 1750 mg in 0.9% Sodium Chloride (premix) 500 mL     Ordering Provider:  Jeronimo Braun RPH    12 mg/kg × 138 kg  over 120 Minutes Intravenous Every 24 Hours 01/09/17 0600      01/08/17 0545  Pharmacy to dose vancomycin     Ordering Provider:  Jeronimo Braun RPH     Does not apply Continuous PRN 01/08/17 0544      01/08/17 0449  ertapenem (INVanz) 1 g/100 mL 0.9% NS VTB (mbp)     Ordering Provider:  Opal Villafuerte MD    1 g  over 30 Minutes Intravenous Every 24 Hours 01/08/17 0451      01/08/17 0229  vancomycin 2000 mg/500 mL 0.9% NS IVPB ([facility])     Ordering Provider:  Donaldo Gonzalez MD    2,000 mg  "Intravenous Once 01/08/17 0231 01/08/17 0327          Allergies:  is allergic to amoxicillin.    Review of Systems: All other reviewed and negative except as per HPI    Blood pressure 144/76, pulse 60, temperature 98.5 °F (36.9 °C), temperature source Oral, resp. rate 16, height 68\" (172.7 cm), weight (!) 304 lb 3.8 oz (138 kg), SpO2 94 %.  GENERAL: Awake and alert, in no acute distress.  Morbidly obese.  HEENT: Oropharynx without thrush. Sinuses nontender. Dentition in good repair. No cervical adenopathy. No carotid bruits/ jugular venous distention.   EYES: PERRL. No conjunctival injection. No icterus. EOMI.  LYMPHATICS: No lymphadenopathy of the neck or axillary or inguinal regions.   HEART: No murmur, gallop, or pericardial friction rub.   LUNGS: Clear to auscultation anteriorly. No percussion dullness.   ABDOMEN: Soft, nontender, nondistended. No appreciable HSM.    SKIN: Warm and dry without cutaneous eruptions. No embolic stigmata.  Decreased erythema and right lower extremity.  Some violaceous changes involving the right fourth digit.  No ascending lymphangitis.  No crepitant gas in the soft tissues.  Multiple small punctate eschars on the right anterior tibial surface.  PSYCHIATRIC: Mental status lucid. Cranial nerve function intact.       DIAGNOSTICS:  Lab Results   Component Value Date    WBC 14.92 (H) 01/08/2017    HGB 13.6 01/08/2017    HCT 41.8 01/08/2017     01/08/2017     Lab Results   Component Value Date    CRP 89.40 (H) 01/08/2017     Lab Results   Component Value Date    SEDRATE 17 01/08/2017     Lab Results   Component Value Date    GLUCOSE 141 (H) 01/08/2017    BUN 24 (H) 01/08/2017    CREATININE 1.50 (H) 01/08/2017    EGFRIFNONA 35 (L) 01/08/2017    BCR 16.0 01/08/2017    CO2 24.0 01/08/2017    CALCIUM 9.5 01/08/2017    ALBUMIN 3.70 01/08/2017    LABIL2 1.0 (L) 01/08/2017    AST 11 01/08/2017    ALT 10 01/08/2017       Microbiology: Cultures ×2 are negative.  Urine culture is " pending.      RADIOLOGY:  Imaging Results (last 72 hours)     Procedure Component Value Units Date/Time    CT Lower Extremity Right Without Contrast [[id number]]  (Abnormal) Collected:  01/08/17 0459     Updated:  01/08/17 0700    Narrative:       EXAM:    CT Right Lower Extremity Without Intravenous Contrast.    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 Ext   cellulitis, please include the foot also    TECHNIQUE:    Axial computed tomography images of the right lower extremity without   intravenous contrast.  This CT exam was performed using one or more of the   following dose reduction techniques:  automated exposure control, adjustment of   the mA and/or kV according to patient size, and/or use of iterative   reconstruction technique.    Coronal and sagittal reformatted images were created and reviewed.    COMPARISON:    CT LOWER EXTREM WO CONTRA 12/10/2015 3:49:18 PM    FINDINGS:    Bones:  There has been interval amputation of the fifth digit at the level of   the distal metatarsal.  No definite osseous erosion or periosteal reaction   appreciated to suggest osteomyelitis.EXAM:    CT Right Lower Extremity Without Intravenous Contrast.    CLINICAL HISTORY:    66 years, female; Type 2 diabetes mellitus without complications; Obesity,   unspecified; Essential (primary) hypertension; Peripheral vascular disease,   unspecified; Varicose veins of right lower extremity with inflammation;   Varicose veins of left lower extremity with inflammation; Gangrene, not   elsewhere classified;  Cellulitis of right lower limb; Chronic kidney disease,   unspecified; Signs and symptoms; Swelling, leg or foot; Additional info: R. Ext   cellulitis, please include the foot also    TECHNIQUE:    Axial computed tomography images of the right lower extremity without   intravenous contrast.  This CT exam was performed using one or more of the   following dose reduction techniques:  automated exposure control, adjustment of   the mA and/or kV according to patient size, and/or use of iterative   reconstruction technique.    Coronal and sagittal reformatted images were created and reviewed.    COMPARISON:    CT LOWER EXTREM WO CONTRA 12/10/2015 3:49:18 PM    FINDINGS:    Bones:  There has been interval indentation of the fifth digit the level of   the distal metatarsal.  No osseous erosion or periosteal reaction appreciated   to suggest osteomyelitis. No acute fracture visualized.      Joints:  There are hypertrophic degenerative changes at the level of the mid   foot.  No dislocation.  No significant joint effusion visualized.    Soft tissues:  Mild subcutaneous edema about the anterolateral aspect of the   knee and proximal calf.  Beginning at the level of the distal calf there is   more circumferential subcutaneous edema which extends into the ankle and foot.    Associated skin thickening.  There is a tiny focus of soft tissue air within   the distal aspect of the fourth digit.  Otherwise, no soft tissue air   visualized. There is no discrete fluid collection appreciated on this   noncontrast exam to suggest abscess.  There is no obvious extension of edema   along with the deep fascial planes to suggest significant fasciitis.    Subcutaneous calcifications again noted.  Compared to the prior exam, there has   been some interval improvement in subcutaneous edema at the level of ankle and   foot.  Otherwise, these findings are not significantly changed.  There is also   osseous spurring along the plantar aspect of the  calcaneus, at the origin of   the plantar fascia.  Enthesopathy noted at the insertion of the Achilles   tendon.      Vasculature:  Atherosclerotic calcifications.  Venous varicosities noted.      Impression:       1.  Diffuse subcutaneous edema.  No discrete abscess appreciated on this   noncontrast exam.  Compared to the prior study, there has been some interval   improvement of edema at the level of the ankle and foot.  2.  Tiny focus of air in the fourth digit.  This could be related to an   overlying skin defect.  Recommend clinical correlation.  3.  No CT evidence of osteomyelitis.    THIS DOCUMENT HAS BEEN ELECTRONICALLY SIGNED BY VERNELL BUENO MD          Assessment and Plan: Acute right lower extremity cellulitis.  Sausage digit involving right fourth toe.  He has history of gas gangrene with fifth ray resection.  Leukocytosis.  Increased inflammatory markers.  Insulin-dependent diabetes mellitus.  Markedly elevated hemoglobin A1c.  Maximum temperature over 24 hours is 98.5°.  Hemodynamically stable.  Blood cultures ×2 negative.  Urine culture and sensitivity incubating.  Erythema is diminished along the right anterior tibial surface.  Utilization management: Acceptable for discharge home today.  To be seen in in our office tomorrow for ceftriaxone 2 g IV every 24 hours.  Please discharge on Flagyl 500 mg 1 by mouth 3 times a day #30.  Follow-up with me in 1 week.      Pa Garza MD  1/10/2017

## 2017-01-10 NOTE — PROGRESS NOTES
Continued Stay Note  [facility]     Patient Name: Nuris Ribeiro  MRN: [id number]  Today's Date: 1/10/2017    Admit Date: 1/8/2017          Discharge Plan     Ms. Ribeiro consented to participation in the [facility]. Liss Britt RN  @[id number].               Discharge Codes     None        Expected Discharge Date and Time     Expected Discharge Date Expected Discharge Time    Lucas 10, 2017             Liss Britt RN

## 2017-01-10 NOTE — DISCHARGE SUMMARY
[facility] Medicine Services  DISCHARGE SUMMARY       Date of Admission: 1/8/2017  Date of Discharge:  1/10/2017  Primary Care Physician: No Known Provider    Discharge Diagnoses:  Active Hospital Problems (** Indicates Principal Problem)    Diagnosis Date Noted   • **Cellulitis of right lower extremity without foot [L03.115] 01/08/2017     Priority: Medium   • Morbid obesity due to excess calories [E66.01] 01/08/2017   • Diabetes type 2, uncontrolled [E11.65] 01/08/2017   • CAD (coronary artery disease) [I25.10] 01/08/2017   • HTN (hypertension) [I10] 01/08/2017   • Hypothyroid [E03.9] 01/08/2017   • CKD stage 3 due to type 2 diabetes mellitus [E11.22, N18.3] 01/08/2017      Resolved Hospital Problems    Diagnosis Date Noted Date Resolved   No resolved problems to display.       Presenting Problem/History of Present Illness  Cellulitis of right lower extremity without foot [L03.115]     Chief Complaint on Day of Discharge: right leg swelling    History of Present Illness on Day of Discharge:   No problems overnight.  Feels her right leg is feeling better.  Little less red and less painful.    Hospital Course  Patient is a 66 y.o. female with a history of uncontrolled diabetes, morbid obesity, and peripheral neuropathy who presents with a swollen and painful right leg.  She has a previous history of gas gangrene of the right foot requiring a fifth ray indication by Dr. Liu.  About a month ago she was trimming her cuticle and feels like she went to deep.  She saw her podiatrist and the nail was taken off.  After that she began to develop increasing soft tissue swelling and extensive cellulitis in the ankle and lower leg.  She was found to have a white blood cell count of 14,000.  She was admitted for further evaluation.  She was begun on empiric antibiotics.  Previously seen by Dr. Garza from infectious disease and he was consulted.  Within the 48 hours symptoms began to improve the plan  "for discharge to be to going to the office daily for 2 g of IV Rocephin along with 500 mg of oral Flagyl 3 times a day.  Seizures also found to have elevated blood sugars in the 300s.  Her hemoglobin A1c was 9.6%.  Upon further questioning she admits to decreasing the amount of insulin she takes secondary to financial constraints.  She was seen by  and given information on a pharmaceutical company assistance program.  Plan will be to discharge her on her home insulin and she is calling Dr. Barry office from endocrinology for recommendations on the cheaper insulin or other alternatives.  Importance of tight glucose control was stressed to her.  Of note she is also interested in possible bariatric surgery and she will pursue this as well.      Procedures Performed         Consults:   Consults     Date and Time Order Name Status Description    1/8/2017 1026 Inpatient Consult to Infectious Diseases Completed           Pertinent Test Results:  Blood cultures remain negative  Hemoglobin A1c was 9.6%    Condition on Discharge:  good    Physical Exam on Discharge:  Visit Vitals   • /76 (BP Location: Left arm, Patient Position: Lying)   • Pulse 60   • Temp 98.5 °F (36.9 °C) (Oral)   • Resp 16   • Ht 68\" (172.7 cm)   • Wt (!) 304 lb 3.8 oz (138 kg)   • SpO2 94%   • BMI 46.26 kg/m2     Physical Exam   Constitutional: She is oriented to person, place, and time. She appears well-developed and well-nourished.   HENT:   Head: Normocephalic.   Eyes: Pupils are equal, round, and reactive to light.   Cardiovascular: Normal rate, regular rhythm and normal heart sounds.    Pulmonary/Chest: Effort normal and breath sounds normal. No respiratory distress. She has no wheezes.   Abdominal: Soft. Bowel sounds are normal.   Obese   Musculoskeletal:   Right lower extremity has 2+ edema.  Erythema starting to fade but still circumferential.  Mild tenderness to the touch   Neurological: She is alert and oriented to person, " "place, and time.   Skin: Skin is warm. No rash noted.   Psychiatric: She has a normal mood and affect.   Vitals reviewed.    Discharge Disposition  Home or Self Care    Discharge Medications   Nuris Ribeiro   Home Medication Instructions TRISTIN:[id number]    Printed on:01/10/17 1054   Medication Information                      aspirin 81 MG tablet  Take  by mouth.             clopidogrel (PLAVIX) 75 MG tablet  Take  by mouth.             insulin detemir (LEVEMIR) 100 UNIT/ML injection  Inject  under the skin.             Insulin Lispro (HUMALOG KWIKPEN) 100 UNIT/ML solution pen-injector  Inject  under the skin. 35 units am, 35 units lunch, 75 units at dinner             Insulin Lispro, Human, 100 UNIT/ML solution pen-injector  Inject  under the skin.             Insulin Syringe-Needle U-100 31G X 5/16\" 1 ML misc               levothyroxine (SYNTHROID, LEVOTHROID) 112 MCG tablet  Take  by mouth.             lisinopril (PRINIVIL,ZESTRIL) 20 MG tablet  Take  by mouth.             metFORMIN (GLUCOPHAGE) 1000 MG tablet  Take 1,000 mg by mouth 2 (two) times a day with meals.             metroNIDAZOLE (FLAGYL) 500 MG tablet  Take 1 tablet by mouth 3 (Three) Times a Day.             simvastatin (ZOCOR) 40 MG tablet  Take  by mouth.                 Discharge Diet:   Diet Instructions     Diet: Consistent Carbohydrate, Cardiac; Thin Liquids, No Restrictions       Discharge Diet:   Consistent Carbohydrate  Cardiac      Fluid Consistency:  Thin Liquids, No Restrictions                 Discharge Care Plan / Instructions:    Activity at Discharge:   Activity Instructions     Activity as Tolerated                     Follow-up Appointments  No future appointments.  Referrals and Follow-ups to Schedule     Follow-Up    As directed    Follow Up Details:  Dr. Garza in office tomorrow           Additional information on Labs and Follow-ups:      YOU WILL GO TO [place] [facility] FOR DAILY ANTIBIOTICS.           "          Test Results Pending at Discharge   Order Current Status    Blood Culture Preliminary result    Blood Culture Preliminary result    Urine Culture Preliminary result           Boby Sanchez MD 01/10/17 10:54 AM    Time: Discharge 35 min    Please note that portions of this note may have been completed with a voice recognition program. Efforts were made to edit the dictations, but occasionally words are mistranscribed.

## 2017-01-10 NOTE — PLAN OF CARE
Problem: Pain, Acute (Adult)  Goal: Identify Related Risk Factors and Signs and Symptoms  Outcome: Ongoing (interventions implemented as appropriate)    01/10/17 0459   Pain, Acute   Related Risk Factors (Acute Pain) patient perception   Signs and Symptoms (Acute Pain) fatigue/weakness

## 2017-01-13 LAB
BACTERIA SPEC AEROBE CULT: NORMAL
BACTERIA SPEC AEROBE CULT: NORMAL

## 2017-01-16 ENCOUNTER — LAB (OUTPATIENT)
Dept: LAB | Facility: HOSPITAL | Age: 67
End: 2017-01-16

## 2017-01-16 DIAGNOSIS — L03.115 CELLULITIS OF RIGHT FOOT: Primary | ICD-10-CM

## 2017-01-16 LAB
ALBUMIN SERPL-MCNC: 3.5 G/DL (ref 3.2–4.8)
ALBUMIN/GLOB SERPL: 1.1 G/DL (ref 1.5–2.5)
ALP SERPL-CCNC: 81 U/L (ref 25–100)
ALT SERPL W P-5'-P-CCNC: 12 U/L (ref 7–40)
ANION GAP SERPL CALCULATED.3IONS-SCNC: 7 MMOL/L (ref 3–11)
AST SERPL-CCNC: 11 U/L (ref 0–33)
BASOPHILS # BLD AUTO: 0.03 10*3/MM3 (ref 0–0.2)
BASOPHILS NFR BLD AUTO: 0.2 % (ref 0–1)
BILIRUB SERPL-MCNC: 0.5 MG/DL (ref 0.3–1.2)
BUN BLD-MCNC: 19 MG/DL (ref 9–23)
BUN/CREAT SERPL: 13.6 (ref 7–25)
CALCIUM SPEC-SCNC: 9.5 MG/DL (ref 8.7–10.4)
CHLORIDE SERPL-SCNC: 104 MMOL/L (ref 99–109)
CO2 SERPL-SCNC: 27 MMOL/L (ref 20–31)
CREAT BLD-MCNC: 1.4 MG/DL (ref 0.6–1.3)
CRP SERPL-MCNC: 7.3 MG/DL (ref 0–10)
DEPRECATED RDW RBC AUTO: 45.1 FL (ref 37–54)
EOSINOPHIL # BLD AUTO: 0.33 10*3/MM3 (ref 0.1–0.3)
EOSINOPHIL NFR BLD AUTO: 2.5 % (ref 0–3)
ERYTHROCYTE [DISTWIDTH] IN BLOOD BY AUTOMATED COUNT: 13.8 % (ref 11.3–14.5)
GFR SERPL CREATININE-BSD FRML MDRD: 38 ML/MIN/1.73
GLOBULIN UR ELPH-MCNC: 3.3 GM/DL
GLUCOSE BLD-MCNC: 392 MG/DL (ref 70–100)
HCT VFR BLD AUTO: 44.6 % (ref 34.5–44)
HGB BLD-MCNC: 14.4 G/DL (ref 11.5–15.5)
IMM GRANULOCYTES # BLD: 0.11 10*3/MM3 (ref 0–0.03)
IMM GRANULOCYTES NFR BLD: 0.8 % (ref 0–0.6)
LYMPHOCYTES # BLD AUTO: 2.41 10*3/MM3 (ref 0.6–4.8)
LYMPHOCYTES NFR BLD AUTO: 18 % (ref 24–44)
MCH RBC QN AUTO: 29.4 PG (ref 27–31)
MCHC RBC AUTO-ENTMCNC: 32.3 G/DL (ref 32–36)
MCV RBC AUTO: 91.2 FL (ref 80–99)
MONOCYTES # BLD AUTO: 0.51 10*3/MM3 (ref 0–1)
MONOCYTES NFR BLD AUTO: 3.8 % (ref 0–12)
NEUTROPHILS # BLD AUTO: 9.99 10*3/MM3 (ref 1.5–8.3)
NEUTROPHILS NFR BLD AUTO: 74.7 % (ref 41–71)
NRBC BLD MANUAL-RTO: 0 /100 WBC (ref 0–0)
PLATELET # BLD AUTO: 452 10*3/MM3 (ref 150–450)
PMV BLD AUTO: 9.4 FL (ref 6–12)
POTASSIUM BLD-SCNC: 6.1 MMOL/L (ref 3.5–5.5)
PROT SERPL-MCNC: 6.8 G/DL (ref 5.7–8.2)
RBC # BLD AUTO: 4.89 10*6/MM3 (ref 3.89–5.14)
SODIUM BLD-SCNC: 138 MMOL/L (ref 132–146)
WBC NRBC COR # BLD: 13.38 10*3/MM3 (ref 3.5–10.8)

## 2017-01-16 PROCEDURE — 36415 COLL VENOUS BLD VENIPUNCTURE: CPT

## 2017-01-16 PROCEDURE — 85025 COMPLETE CBC W/AUTO DIFF WBC: CPT | Performed by: INTERNAL MEDICINE

## 2017-01-16 PROCEDURE — 86140 C-REACTIVE PROTEIN: CPT | Performed by: INTERNAL MEDICINE

## 2017-01-16 PROCEDURE — 80053 COMPREHEN METABOLIC PANEL: CPT | Performed by: INTERNAL MEDICINE

## 2017-02-20 ENCOUNTER — DOCUMENTATION (OUTPATIENT)
Dept: BARIATRICS/WEIGHT MGMT | Facility: CLINIC | Age: 67
End: 2017-02-20

## 2017-02-20 DIAGNOSIS — R10.13 DYSPEPSIA: ICD-10-CM

## 2017-02-20 DIAGNOSIS — E11.69 DIABETES MELLITUS TYPE 2 IN OBESE (HCC): ICD-10-CM

## 2017-02-20 DIAGNOSIS — R06.00 DYSPNEA, UNSPECIFIED TYPE: ICD-10-CM

## 2017-02-20 DIAGNOSIS — R53.83 FATIGUE, UNSPECIFIED TYPE: Primary | ICD-10-CM

## 2017-02-20 DIAGNOSIS — E66.9 DIABETES MELLITUS TYPE 2 IN OBESE (HCC): ICD-10-CM

## 2017-03-01 DIAGNOSIS — R06.00 DYSPNEA, UNSPECIFIED TYPE: ICD-10-CM

## 2017-03-01 DIAGNOSIS — E66.9 DIABETES MELLITUS TYPE 2 IN OBESE (HCC): ICD-10-CM

## 2017-03-01 DIAGNOSIS — R10.13 DYSPEPSIA: ICD-10-CM

## 2017-03-01 DIAGNOSIS — E11.69 DIABETES MELLITUS TYPE 2 IN OBESE (HCC): ICD-10-CM

## 2017-03-01 DIAGNOSIS — R53.83 FATIGUE, UNSPECIFIED TYPE: ICD-10-CM

## 2017-03-02 ENCOUNTER — OFFICE VISIT (OUTPATIENT)
Dept: BARIATRICS/WEIGHT MGMT | Facility: CLINIC | Age: 67
End: 2017-03-02

## 2017-03-02 ENCOUNTER — DOCUMENTATION (OUTPATIENT)
Dept: BARIATRICS/WEIGHT MGMT | Facility: HOSPITAL | Age: 67
End: 2017-03-02

## 2017-03-02 VITALS
HEART RATE: 92 BPM | SYSTOLIC BLOOD PRESSURE: 125 MMHG | BODY MASS INDEX: 44.41 KG/M2 | WEIGHT: 293 LBS | TEMPERATURE: 97.6 F | OXYGEN SATURATION: 97 % | HEIGHT: 68 IN | DIASTOLIC BLOOD PRESSURE: 63 MMHG | RESPIRATION RATE: 18 BRPM

## 2017-03-02 DIAGNOSIS — N18.9 CKD (CHRONIC KIDNEY DISEASE), UNSPECIFIED STAGE: ICD-10-CM

## 2017-03-02 DIAGNOSIS — IMO0002 UNCONTROLLED TYPE 2 DIABETES MELLITUS WITH OTHER SPECIFIED COMPLICATION: Primary | ICD-10-CM

## 2017-03-02 DIAGNOSIS — E66.01 MORBID OBESITY, UNSPECIFIED OBESITY TYPE (HCC): ICD-10-CM

## 2017-03-02 DIAGNOSIS — I10 ESSENTIAL HYPERTENSION: ICD-10-CM

## 2017-03-02 PROCEDURE — 99205 OFFICE O/P NEW HI 60 MIN: CPT | Performed by: PHYSICIAN ASSISTANT

## 2017-03-02 RX ORDER — ALLOPURINOL 100 MG/1
100 TABLET ORAL DAILY
COMMUNITY
Start: 2017-02-03

## 2017-03-02 NOTE — PROGRESS NOTES
"Weight Loss Surgery  Presurgical Nutrition Assessment     Nuris Ribeiro  03/02/2017  [id number]  [id number]  1950  female    Surgery desired: Sleeve Gastrectomy    Physical Exam:  Vital Signs:  Weight: 297 lb 8 oz (135 kg)   Body mass index is 45.23 kg/(m^2).  Past Medical History   Diagnosis Date   • CKD (chronic kidney disease)      Cr 1.4   • Coronary artery disease      s/p stenting 2008, on ASA/Plavix, followed by Dr. Buck   • Diabetes mellitus      dx \"many yrs ago\", on insulin >10 yrs, A1c 9.6   • Dyslipidemia    • Dyspnea on exertion    • Fatigue    • Gangrene      (R) 5th toe amputated   • History of DVT (deep vein thrombosis)      years ago, etiology unclear, tx w/ Warfarin   • Hypertension    • Hypothyroidism    • Morbid obesity    • Peripheral edema    • PVD (peripheral vascular disease)      w/ cellulitis of RLE 1/2017 tx w/ Vanc/Rocephin   • Vitamin D deficiency      Past Surgical History   Procedure Laterality Date   • Tonsillectomy  1967   • Coronary angioplasty with stent placement  2008   • Toe amputation Right 2015     5th toe, d/t gangrene     Allergies   Allergen Reactions   • Amoxicillin Rash     can tolerate Rocephin       Current Outpatient Prescriptions:   •  allopurinol (ZYLOPRIM) 100 MG tablet, Daily., Disp: , Rfl:   •  aspirin 81 MG tablet, Take  by mouth., Disp: , Rfl:   •  clopidogrel (PLAVIX) 75 MG tablet, Take  by mouth., Disp: , Rfl:   •  insulin detemir (LEVEMIR) 100 UNIT/ML injection, Inject  under the skin., Disp: , Rfl:   •  Insulin Lispro (HUMALOG KWIKPEN) 100 UNIT/ML solution pen-injector, Inject  under the skin. 35 units am, 35 units lunch, 75 units at dinner, Disp: , Rfl:   •  Insulin Lispro, Human, 100 UNIT/ML solution pen-injector, Inject  under the skin., Disp: , Rfl:   •  Insulin Syringe-Needle U-100 31G X 5/16\" 1 ML misc, , Disp: , Rfl:   •  levothyroxine (SYNTHROID, LEVOTHROID) 112 MCG tablet, Take  by mouth., Disp: , Rfl:   •  lisinopril (PRINIVIL,ZESTRIL) " 20 MG tablet, Take  by mouth., Disp: , Rfl:   •  metFORMIN (GLUCOPHAGE) 1000 MG tablet, Take 1,000 mg by mouth 2 (two) times a day with meals., Disp: , Rfl:   •  metroNIDAZOLE (FLAGYL) 500 MG tablet, Take 1 tablet by mouth 3 (Three) Times a Day., Disp: 30 tablet, Rfl: 0  •  simvastatin (ZOCOR) 40 MG tablet, Take  by mouth., Disp: , Rfl:     Current Facility-Administered Medications:   •  cefTRIAXone (ROCEPHIN) injection 2 g, 2 g, Intramuscular, Q24H, Boby Sanchez MD      Nutrition Assessment    Estimated energy needs: 2200    Estimated calories for weight loss: 1700    IBW (Pounds):  175      Excess body weight (Pounds): 122       Nutrition Recall  24 Hour recall: (B) (L) (D) -  Reviewed and discussed with patient      Exercise  None      Education    Provided manual:  Sleeve Gastrectomy    Recommend that team proceed with surgery and follow per protocol.      Nutrition Goals   Dietary Guidelines per manual  Protein goal:  grams per day   Eliminate soda    Exercise Goals  Add 15-30 minutes of activity per day as tolerated          Faye Pool, FEROZ  03/02/2017  3:15 PM

## 2017-03-06 LAB
ALBUMIN SERPL-MCNC: 3.8 G/DL (ref 3.2–4.8)
ALBUMIN/GLOB SERPL: 1 G/DL (ref 1.5–2.5)
ALP SERPL-CCNC: 77 U/L (ref 25–100)
ALT SERPL-CCNC: 14 U/L (ref 7–40)
AST SERPL-CCNC: 16 U/L (ref 0–33)
BILIRUB SERPL-MCNC: 0.6 MG/DL (ref 0.3–1.2)
BUN SERPL-MCNC: 28 MG/DL (ref 9–23)
BUN/CREAT SERPL: 18.7 (ref 7–25)
CALCIUM SERPL-MCNC: 10 MG/DL (ref 8.7–10.4)
CHLORIDE SERPL-SCNC: 100 MMOL/L (ref 99–109)
CHOLEST SERPL-MCNC: 133 MG/DL (ref 0–200)
CO2 SERPL-SCNC: 29 MMOL/L (ref 20–31)
CREAT SERPL-MCNC: 1.5 MG/DL (ref 0.6–1.3)
ERYTHROCYTE [DISTWIDTH] IN BLOOD BY AUTOMATED COUNT: 13.6 % (ref 11.3–14.5)
GLOBULIN SER CALC-MCNC: 3.7 GM/DL
GLUCOSE SERPL-MCNC: 243 MG/DL (ref 70–100)
H PYLORI IGA SER-ACNC: <9 UNITS (ref 0–8.9)
H PYLORI IGG SER IA-ACNC: <0.9 U/ML (ref 0–0.8)
H PYLORI IGM SER-ACNC: <9 UNITS (ref 0–8.9)
HBA1C MFR BLD: 9.8 % (ref 4.8–5.6)
HCT VFR BLD AUTO: 45.7 % (ref 34.5–44)
HDLC SERPL-MCNC: 32 MG/DL (ref 40–60)
HGB BLD-MCNC: 15 G/DL (ref 11.5–15.5)
LDLC SERPL CALC-MCNC: 69 MG/DL (ref 0–100)
MCH RBC QN AUTO: 29.4 PG (ref 27–31)
MCHC RBC AUTO-ENTMCNC: 32.8 G/DL (ref 32–36)
MCV RBC AUTO: 89.4 FL (ref 80–99)
PLATELET # BLD AUTO: 414 10*3/MM3 (ref 150–450)
POTASSIUM SERPL-SCNC: 5.2 MMOL/L (ref 3.5–5.5)
PROT SERPL-MCNC: 7.5 G/DL (ref 5.7–8.2)
RBC # BLD AUTO: 5.11 10*6/MM3 (ref 3.89–5.14)
SODIUM SERPL-SCNC: 133 MMOL/L (ref 132–146)
TRIGL SERPL-MCNC: 162 MG/DL (ref 0–150)
TSH SERPL DL<=0.005 MIU/L-ACNC: 1.69 MIU/ML (ref 0.35–5.35)
VLDLC SERPL CALC-MCNC: 32.4 MG/DL
WBC # BLD AUTO: 15.18 10*3/MM3 (ref 3.5–10.8)

## 2017-05-17 DIAGNOSIS — R10.13 DYSPEPSIA: ICD-10-CM

## 2017-05-17 DIAGNOSIS — R06.00 DYSPNEA, UNSPECIFIED TYPE: Primary | ICD-10-CM

## 2017-05-17 DIAGNOSIS — R53.83 FATIGUE, UNSPECIFIED TYPE: ICD-10-CM

## 2017-05-19 ENCOUNTER — APPOINTMENT (OUTPATIENT)
Dept: LAB | Facility: HOSPITAL | Age: 67
End: 2017-05-19

## 2017-05-19 ENCOUNTER — HOSPITAL ENCOUNTER (OUTPATIENT)
Dept: GENERAL RADIOLOGY | Facility: HOSPITAL | Age: 67
Discharge: HOME | End: 2017-05-19
Admitting: PHYSICIAN ASSISTANT

## 2017-05-19 LAB
ALBUMIN SERPL-MCNC: 4 G/DL (ref 3.2–4.8)
ALBUMIN/GLOB SERPL: 1.2 G/DL (ref 1.5–2.5)
ALP SERPL-CCNC: 67 U/L (ref 25–100)
ALT SERPL W P-5'-P-CCNC: 19 U/L (ref 7–40)
ANION GAP SERPL CALCULATED.3IONS-SCNC: 5 MMOL/L (ref 3–11)
AST SERPL-CCNC: 20 U/L (ref 0–33)
BILIRUB SERPL-MCNC: 0.5 MG/DL (ref 0.3–1.2)
BUN BLD-MCNC: 21 MG/DL (ref 9–23)
BUN/CREAT SERPL: 15 (ref 7–25)
CALCIUM SPEC-SCNC: 10.3 MG/DL (ref 8.7–10.4)
CHLORIDE SERPL-SCNC: 103 MMOL/L (ref 99–109)
CO2 SERPL-SCNC: 28 MMOL/L (ref 20–31)
CREAT BLD-MCNC: 1.4 MG/DL (ref 0.6–1.3)
DEPRECATED RDW RBC AUTO: 47 FL (ref 37–54)
ERYTHROCYTE [DISTWIDTH] IN BLOOD BY AUTOMATED COUNT: 13.9 % (ref 11.3–14.5)
GFR SERPL CREATININE-BSD FRML MDRD: 38 ML/MIN/1.73
GLOBULIN UR ELPH-MCNC: 3.4 GM/DL
GLUCOSE BLD-MCNC: 121 MG/DL (ref 70–100)
HCT VFR BLD AUTO: 46.8 % (ref 34.5–44)
HGB BLD-MCNC: 15.2 G/DL (ref 11.5–15.5)
MCH RBC QN AUTO: 29.9 PG (ref 27–31)
MCHC RBC AUTO-ENTMCNC: 32.5 G/DL (ref 32–36)
MCV RBC AUTO: 92.1 FL (ref 80–99)
PLATELET # BLD AUTO: 439 10*3/MM3 (ref 150–450)
PMV BLD AUTO: 9.9 FL (ref 6–12)
POTASSIUM BLD-SCNC: 5.1 MMOL/L (ref 3.5–5.5)
PROT SERPL-MCNC: 7.4 G/DL (ref 5.7–8.2)
RBC # BLD AUTO: 5.08 10*6/MM3 (ref 3.89–5.14)
SODIUM BLD-SCNC: 136 MMOL/L (ref 132–146)
WBC NRBC COR # BLD: 13.98 10*3/MM3 (ref 3.5–10.8)

## 2017-05-19 PROCEDURE — 80053 COMPREHEN METABOLIC PANEL: CPT | Performed by: PHYSICIAN ASSISTANT

## 2017-05-19 PROCEDURE — 71020 HC CHEST PA AND LATERAL: CPT

## 2017-05-19 PROCEDURE — 36415 COLL VENOUS BLD VENIPUNCTURE: CPT | Performed by: PHYSICIAN ASSISTANT

## 2017-05-19 PROCEDURE — 85027 COMPLETE CBC AUTOMATED: CPT | Performed by: PHYSICIAN ASSISTANT

## 2017-05-22 ENCOUNTER — LAB (OUTPATIENT)
Dept: LAB | Facility: HOSPITAL | Age: 67
End: 2017-05-22

## 2017-05-22 DIAGNOSIS — Z01.818 PRE-OP TESTING: Primary | ICD-10-CM

## 2017-05-22 PROCEDURE — 93010 ELECTROCARDIOGRAM REPORT: CPT | Performed by: INTERNAL MEDICINE

## 2017-05-22 PROCEDURE — 93005 ELECTROCARDIOGRAM TRACING: CPT

## 2017-05-23 ENCOUNTER — CONSULT (OUTPATIENT)
Dept: BARIATRICS/WEIGHT MGMT | Facility: CLINIC | Age: 67
End: 2017-05-23

## 2017-05-23 VITALS
SYSTOLIC BLOOD PRESSURE: 144 MMHG | DIASTOLIC BLOOD PRESSURE: 69 MMHG | OXYGEN SATURATION: 96 % | BODY MASS INDEX: 44.03 KG/M2 | HEIGHT: 68 IN | WEIGHT: 290.5 LBS | TEMPERATURE: 98.4 F | HEART RATE: 84 BPM | RESPIRATION RATE: 16 BRPM

## 2017-05-23 DIAGNOSIS — E66.01 OBESITY, CLASS III, BMI 40-49.9 (MORBID OBESITY) (HCC): Primary | ICD-10-CM

## 2017-05-23 PROCEDURE — 99407 BEHAV CHNG SMOKING > 10 MIN: CPT | Performed by: SURGERY

## 2017-05-23 PROCEDURE — 99215 OFFICE O/P EST HI 40 MIN: CPT | Performed by: SURGERY

## 2017-05-23 RX ORDER — CHLORHEXIDINE GLUCONATE 0.12 MG/ML
15 RINSE ORAL ONCE
Status: CANCELLED | OUTPATIENT
Start: 2017-05-23

## 2017-05-23 RX ORDER — ACETAMINOPHEN 10 MG/ML
1000 INJECTION, SOLUTION INTRAVENOUS ONCE
Status: CANCELLED | OUTPATIENT
Start: 2017-05-23 | End: 2017-05-23

## 2017-05-23 RX ORDER — MELATONIN
1000 DAILY
COMMUNITY

## 2017-05-23 RX ORDER — PANTOPRAZOLE SODIUM 40 MG/10ML
40 INJECTION, POWDER, LYOPHILIZED, FOR SOLUTION INTRAVENOUS ONCE
Status: CANCELLED | OUTPATIENT
Start: 2017-05-23 | End: 2017-05-23

## 2017-05-23 RX ORDER — SODIUM CHLORIDE 0.9 % (FLUSH) 0.9 %
1-10 SYRINGE (ML) INJECTION AS NEEDED
Status: CANCELLED | OUTPATIENT
Start: 2017-05-23

## 2017-05-23 RX ORDER — SODIUM CHLORIDE 9 MG/ML
150 INJECTION, SOLUTION INTRAVENOUS CONTINUOUS
Status: CANCELLED | OUTPATIENT
Start: 2017-05-23

## 2017-05-23 RX ORDER — SCOLOPAMINE TRANSDERMAL SYSTEM 1 MG/1
1 PATCH, EXTENDED RELEASE TRANSDERMAL ONCE
Status: CANCELLED | OUTPATIENT
Start: 2017-05-23 | End: 2017-05-23

## 2017-05-30 ENCOUNTER — TELEPHONE (OUTPATIENT)
Dept: BARIATRICS/WEIGHT MGMT | Facility: CLINIC | Age: 67
End: 2017-05-30

## 2017-06-01 ENCOUNTER — APPOINTMENT (OUTPATIENT)
Dept: PREADMISSION TESTING | Facility: HOSPITAL | Age: 67
End: 2017-06-01

## 2017-06-01 ENCOUNTER — ANESTHESIA EVENT (OUTPATIENT)
Dept: PERIOP | Facility: HOSPITAL | Age: 67
End: 2017-06-01

## 2017-06-01 LAB
DEPRECATED RDW RBC AUTO: 46.4 FL (ref 37–54)
ERYTHROCYTE [DISTWIDTH] IN BLOOD BY AUTOMATED COUNT: 14.1 % (ref 11.3–14.5)
HCT VFR BLD AUTO: 46 % (ref 34.5–44)
HGB BLD-MCNC: 15 G/DL (ref 11.5–15.5)
MCH RBC QN AUTO: 29.6 PG (ref 27–31)
MCHC RBC AUTO-ENTMCNC: 32.6 G/DL (ref 32–36)
MCV RBC AUTO: 90.7 FL (ref 80–99)
PLATELET # BLD AUTO: 463 10*3/MM3 (ref 150–450)
PMV BLD AUTO: 9.8 FL (ref 6–12)
RBC # BLD AUTO: 5.07 10*6/MM3 (ref 3.89–5.14)
WBC NRBC COR # BLD: 14.81 10*3/MM3 (ref 3.5–10.8)

## 2017-06-01 RX ORDER — FAMOTIDINE 20 MG/1
20 TABLET, FILM COATED ORAL ONCE
Status: CANCELLED | OUTPATIENT
Start: 2017-06-01 | End: 2017-06-01

## 2017-06-01 RX ORDER — FAMOTIDINE 10 MG/ML
20 INJECTION, SOLUTION INTRAVENOUS ONCE
Status: CANCELLED | OUTPATIENT
Start: 2017-06-01 | End: 2017-06-01

## 2017-06-01 NOTE — DISCHARGE INSTRUCTIONS
The following information and instructions were given:    NPO after MN except sips of water with routine prescribed medication (except blood thinner, diabetes, or weight reducing medication) unless otherwise instructed by your physician.  Do not eat, drink, smoke or chew gum after MN the night before surgery. This also includes no mints.    DO NOT shave, wear makeup or dark nail polish.    Remove all jewelry (advised to go to jeweler if unable to remove).    Leave anything you consider valuable at home.    Leave your suitcase in the car until after your surgery.    Bring the following with you (if applicable)   -picture ID and insurance cards   -Co-pay/deductible required by insurance   -Medications in the original bottles (not a list) including all over-the-counter  medications if not brought to PAT   -Copy of advance directive, living will or power of  documents if not  brought to PAT   -CPAP or BIPAP mask and tubing (do not bring machine)   -Skin prep instructions sheet   -PAT Pass   Education booklet, brochure, handout or binder given to patient.    Pain Control After Surgery handout given to patient.    Respirex use (handout given to patient) and pneumonia prevention.    Signs and Symptoms of infection.    DVT Prevention stressing the importance of ambulation.    Patient to apply Chlorhexadine wipes to surgical area (as instructed) the night before procedure and the AM of procedure.      Gastric sleeve information

## 2017-06-02 ENCOUNTER — ANESTHESIA (OUTPATIENT)
Dept: PERIOP | Facility: HOSPITAL | Age: 67
End: 2017-06-02

## 2017-06-02 ENCOUNTER — HOSPITAL ENCOUNTER (INPATIENT)
Facility: HOSPITAL | Age: 67
LOS: 2 days | Discharge: HOME | End: 2017-06-04
Attending: SURGERY | Admitting: SURGERY

## 2017-06-02 DIAGNOSIS — E66.01 OBESITY, CLASS III, BMI 40-49.9 (MORBID OBESITY) (HCC): ICD-10-CM

## 2017-06-02 PROBLEM — E66.813 OBESITY, CLASS III, BMI 40-49.9 (MORBID OBESITY): Status: ACTIVE | Noted: 2017-06-02

## 2017-06-02 LAB
ANION GAP SERPL CALCULATED.3IONS-SCNC: 9 MMOL/L (ref 3–11)
BUN BLD-MCNC: 60 MG/DL (ref 9–23)
BUN/CREAT SERPL: 35.3 (ref 7–25)
CALCIUM SPEC-SCNC: 10.1 MG/DL (ref 8.7–10.4)
CHLORIDE SERPL-SCNC: 103 MMOL/L (ref 99–109)
CO2 SERPL-SCNC: 23 MMOL/L (ref 20–31)
CREAT BLD-MCNC: 1.7 MG/DL (ref 0.6–1.3)
GFR SERPL CREATININE-BSD FRML MDRD: 30 ML/MIN/1.73
GLUCOSE BLD-MCNC: 124 MG/DL (ref 70–100)
GLUCOSE BLDC GLUCOMTR-MCNC: 116 MG/DL (ref 70–130)
GLUCOSE BLDC GLUCOMTR-MCNC: 165 MG/DL (ref 70–130)
GLUCOSE BLDC GLUCOMTR-MCNC: 208 MG/DL (ref 70–130)
GLUCOSE BLDC GLUCOMTR-MCNC: 212 MG/DL (ref 70–130)
POTASSIUM BLD-SCNC: 4.8 MMOL/L (ref 3.5–5.5)
SODIUM BLD-SCNC: 135 MMOL/L (ref 132–146)

## 2017-06-02 PROCEDURE — 43775 LAP SLEEVE GASTRECTOMY: CPT | Performed by: SURGERY

## 2017-06-02 PROCEDURE — 25010000002 DEXAMETHASONE SODIUM PHOSPHATE 10 MG/ML SOLUTION: Performed by: NURSE ANESTHETIST, CERTIFIED REGISTERED

## 2017-06-02 PROCEDURE — 25010000002 MORPHINE PER 10 MG: Performed by: SURGERY

## 2017-06-02 PROCEDURE — 0DJ08ZZ INSPECTION OF UPPER INTESTINAL TRACT, VIA NATURAL OR ARTIFICIAL OPENING ENDOSCOPIC: ICD-10-PCS | Performed by: SURGERY

## 2017-06-02 PROCEDURE — 25010000002 ENOXAPARIN PER 10 MG: Performed by: SURGERY

## 2017-06-02 PROCEDURE — 25010000002 PROPOFOL 1000 MG/ML EMULSION: Performed by: NURSE ANESTHETIST, CERTIFIED REGISTERED

## 2017-06-02 PROCEDURE — 82962 GLUCOSE BLOOD TEST: CPT

## 2017-06-02 PROCEDURE — 0DB64Z3 EXCISION OF STOMACH, PERCUTANEOUS ENDOSCOPIC APPROACH, VERTICAL: ICD-10-PCS | Performed by: SURGERY

## 2017-06-02 PROCEDURE — 25010000003 CEFAZOLIN IN DEXTROSE 2-4 GM/100ML-% SOLUTION: Performed by: SURGERY

## 2017-06-02 PROCEDURE — 94799 UNLISTED PULMONARY SVC/PX: CPT

## 2017-06-02 PROCEDURE — G0378 HOSPITAL OBSERVATION PER HR: HCPCS

## 2017-06-02 PROCEDURE — 25010000002 PROPOFOL 10 MG/ML EMULSION: Performed by: NURSE ANESTHETIST, CERTIFIED REGISTERED

## 2017-06-02 PROCEDURE — 25010000002 BUPRENORPHINE PER 0.1 MG: Performed by: NURSE ANESTHETIST, CERTIFIED REGISTERED

## 2017-06-02 PROCEDURE — 88307 TISSUE EXAM BY PATHOLOGIST: CPT | Performed by: SURGERY

## 2017-06-02 PROCEDURE — 25010000002 FENTANYL CITRATE (PF) 100 MCG/2ML SOLUTION: Performed by: ANESTHESIOLOGY

## 2017-06-02 PROCEDURE — 80048 BASIC METABOLIC PNL TOTAL CA: CPT | Performed by: ANESTHESIOLOGY

## 2017-06-02 PROCEDURE — 25010000002 ONDANSETRON PER 1 MG: Performed by: NURSE ANESTHETIST, CERTIFIED REGISTERED

## 2017-06-02 PROCEDURE — 25010000002 NEOSTIGMINE 10 MG/10ML SOLUTION: Performed by: NURSE ANESTHETIST, CERTIFIED REGISTERED

## 2017-06-02 DEVICE — SEALANT FIBRIN TISSEEL FZ 4ML: Type: IMPLANTABLE DEVICE | Site: STOMACH | Status: FUNCTIONAL

## 2017-06-02 DEVICE — PERI-STRIPS DRY WITH VERITAS COLLAGEN MATRIX (PSD-V) IS PREPARED FROM DEHYDRATED BOVINE PERICARDIUM PROCURED FROM CATTLE UNDER 30 MONTHS OF AGE IN THE UNITED STATES. ONE (1) TUBE OF PSD GEL (GEL) IS PROVIDED FOR EVERY TWO (2) POUCHES OF PSD-V. THE GEL IS USED TO CREATE A TEMPORARY BOND BETWEEN THE PSD-V BUTTRESS AND THE SURGICAL STAPLER JAWS UNTIL THE STAPLER IS POSITIONED AND FIRED.
Type: IMPLANTABLE DEVICE | Site: STOMACH | Status: FUNCTIONAL
Brand: PERI-STRIPS DRY WITH VERITAS COLLAGEN MATRIX

## 2017-06-02 RX ORDER — MAGNESIUM HYDROXIDE 1200 MG/15ML
LIQUID ORAL AS NEEDED
Status: DISCONTINUED | OUTPATIENT
Start: 2017-06-02 | End: 2017-06-02 | Stop reason: HOSPADM

## 2017-06-02 RX ORDER — HYDROMORPHONE HYDROCHLORIDE 1 MG/ML
0.5 INJECTION, SOLUTION INTRAMUSCULAR; INTRAVENOUS; SUBCUTANEOUS
Status: DISCONTINUED | OUTPATIENT
Start: 2017-06-02 | End: 2017-06-02 | Stop reason: HOSPADM

## 2017-06-02 RX ORDER — BUPIVACAINE HYDROCHLORIDE 2.5 MG/ML
INJECTION, SOLUTION EPIDURAL; INFILTRATION; INTRACAUDAL AS NEEDED
Status: DISCONTINUED | OUTPATIENT
Start: 2017-06-02 | End: 2017-06-02 | Stop reason: SURG

## 2017-06-02 RX ORDER — PROPOFOL 10 MG/ML
VIAL (ML) INTRAVENOUS AS NEEDED
Status: DISCONTINUED | OUTPATIENT
Start: 2017-06-02 | End: 2017-06-02 | Stop reason: SURG

## 2017-06-02 RX ORDER — METOCLOPRAMIDE HYDROCHLORIDE 5 MG/ML
10 INJECTION INTRAMUSCULAR; INTRAVENOUS EVERY 6 HOURS PRN
Status: DISCONTINUED | OUTPATIENT
Start: 2017-06-02 | End: 2017-06-04 | Stop reason: HOSPADM

## 2017-06-02 RX ORDER — ONDANSETRON 2 MG/ML
4 INJECTION INTRAMUSCULAR; INTRAVENOUS ONCE AS NEEDED
Status: DISCONTINUED | OUTPATIENT
Start: 2017-06-02 | End: 2017-06-02 | Stop reason: HOSPADM

## 2017-06-02 RX ORDER — GLYCOPYRROLATE 0.2 MG/ML
INJECTION INTRAMUSCULAR; INTRAVENOUS AS NEEDED
Status: DISCONTINUED | OUTPATIENT
Start: 2017-06-02 | End: 2017-06-02 | Stop reason: SURG

## 2017-06-02 RX ORDER — LIDOCAINE HYDROCHLORIDE 10 MG/ML
0.5 INJECTION, SOLUTION EPIDURAL; INFILTRATION; INTRACAUDAL; PERINEURAL ONCE AS NEEDED
Status: COMPLETED | OUTPATIENT
Start: 2017-06-02 | End: 2017-06-02

## 2017-06-02 RX ORDER — SODIUM CHLORIDE, SODIUM LACTATE, POTASSIUM CHLORIDE, CALCIUM CHLORIDE 600; 310; 30; 20 MG/100ML; MG/100ML; MG/100ML; MG/100ML
9 INJECTION, SOLUTION INTRAVENOUS CONTINUOUS
Status: DISCONTINUED | OUTPATIENT
Start: 2017-06-02 | End: 2017-06-02 | Stop reason: HOSPADM

## 2017-06-02 RX ORDER — ALLOPURINOL 100 MG/1
100 TABLET ORAL DAILY
Status: DISCONTINUED | OUTPATIENT
Start: 2017-06-03 | End: 2017-06-04 | Stop reason: HOSPADM

## 2017-06-02 RX ORDER — ONDANSETRON 4 MG/1
4 TABLET, FILM COATED ORAL EVERY 6 HOURS PRN
Status: DISCONTINUED | OUTPATIENT
Start: 2017-06-02 | End: 2017-06-04 | Stop reason: HOSPADM

## 2017-06-02 RX ORDER — CHLORHEXIDINE GLUCONATE 0.12 MG/ML
15 RINSE ORAL ONCE
Status: COMPLETED | OUTPATIENT
Start: 2017-06-02 | End: 2017-06-02

## 2017-06-02 RX ORDER — HYDROMORPHONE HYDROCHLORIDE 2 MG/1
2 TABLET ORAL EVERY 4 HOURS PRN
Status: DISCONTINUED | OUTPATIENT
Start: 2017-06-02 | End: 2017-06-04 | Stop reason: HOSPADM

## 2017-06-02 RX ORDER — PROMETHAZINE HYDROCHLORIDE 25 MG/ML
12.5 INJECTION, SOLUTION INTRAMUSCULAR; INTRAVENOUS EVERY 4 HOURS PRN
Status: DISCONTINUED | OUTPATIENT
Start: 2017-06-02 | End: 2017-06-04 | Stop reason: HOSPADM

## 2017-06-02 RX ORDER — MORPHINE SULFATE 4 MG/ML
6 INJECTION, SOLUTION INTRAMUSCULAR; INTRAVENOUS
Status: DISCONTINUED | OUTPATIENT
Start: 2017-06-02 | End: 2017-06-04 | Stop reason: HOSPADM

## 2017-06-02 RX ORDER — BUPRENORPHINE HYDROCHLORIDE 0.32 MG/ML
INJECTION INTRAMUSCULAR; INTRAVENOUS AS NEEDED
Status: DISCONTINUED | OUTPATIENT
Start: 2017-06-02 | End: 2017-06-02 | Stop reason: SURG

## 2017-06-02 RX ORDER — PROMETHAZINE HYDROCHLORIDE 25 MG/ML
12.5 INJECTION, SOLUTION INTRAMUSCULAR; INTRAVENOUS EVERY 6 HOURS PRN
Status: DISCONTINUED | OUTPATIENT
Start: 2017-06-02 | End: 2017-06-04 | Stop reason: HOSPADM

## 2017-06-02 RX ORDER — MORPHINE SULFATE 4 MG/ML
4 INJECTION, SOLUTION INTRAMUSCULAR; INTRAVENOUS
Status: DISCONTINUED | OUTPATIENT
Start: 2017-06-02 | End: 2017-06-04 | Stop reason: HOSPADM

## 2017-06-02 RX ORDER — ONDANSETRON 2 MG/ML
4 INJECTION INTRAMUSCULAR; INTRAVENOUS EVERY 6 HOURS PRN
Status: DISCONTINUED | OUTPATIENT
Start: 2017-06-02 | End: 2017-06-04 | Stop reason: HOSPADM

## 2017-06-02 RX ORDER — DIPHENHYDRAMINE HYDROCHLORIDE 50 MG/ML
25 INJECTION INTRAMUSCULAR; INTRAVENOUS EVERY 4 HOURS PRN
Status: DISCONTINUED | OUTPATIENT
Start: 2017-06-02 | End: 2017-06-04 | Stop reason: HOSPADM

## 2017-06-02 RX ORDER — SODIUM CHLORIDE 9 MG/ML
150 INJECTION, SOLUTION INTRAVENOUS CONTINUOUS
Status: DISCONTINUED | OUTPATIENT
Start: 2017-06-02 | End: 2017-06-02 | Stop reason: HOSPADM

## 2017-06-02 RX ORDER — CEFAZOLIN SODIUM 2 G/100ML
2 INJECTION, SOLUTION INTRAVENOUS EVERY 8 HOURS
Status: COMPLETED | OUTPATIENT
Start: 2017-06-02 | End: 2017-06-03

## 2017-06-02 RX ORDER — ESMOLOL HYDROCHLORIDE 10 MG/ML
INJECTION INTRAVENOUS AS NEEDED
Status: DISCONTINUED | OUTPATIENT
Start: 2017-06-02 | End: 2017-06-02 | Stop reason: SURG

## 2017-06-02 RX ORDER — CYANOCOBALAMIN 1000 UG/ML
1000 INJECTION, SOLUTION INTRAMUSCULAR; SUBCUTANEOUS ONCE
Status: COMPLETED | OUTPATIENT
Start: 2017-06-03 | End: 2017-06-03

## 2017-06-02 RX ORDER — ONDANSETRON 2 MG/ML
INJECTION INTRAMUSCULAR; INTRAVENOUS AS NEEDED
Status: DISCONTINUED | OUTPATIENT
Start: 2017-06-02 | End: 2017-06-02 | Stop reason: SURG

## 2017-06-02 RX ORDER — LISINOPRIL 20 MG/1
20 TABLET ORAL DAILY
Status: DISCONTINUED | OUTPATIENT
Start: 2017-06-03 | End: 2017-06-03

## 2017-06-02 RX ORDER — LEVOTHYROXINE SODIUM 112 UG/1
112 TABLET ORAL
Status: DISCONTINUED | OUTPATIENT
Start: 2017-06-03 | End: 2017-06-04 | Stop reason: HOSPADM

## 2017-06-02 RX ORDER — NALOXONE HCL 0.4 MG/ML
0.4 VIAL (ML) INJECTION
Status: DISCONTINUED | OUTPATIENT
Start: 2017-06-02 | End: 2017-06-04 | Stop reason: HOSPADM

## 2017-06-02 RX ORDER — ATRACURIUM BESYLATE 10 MG/ML
INJECTION, SOLUTION INTRAVENOUS AS NEEDED
Status: DISCONTINUED | OUTPATIENT
Start: 2017-06-02 | End: 2017-06-02 | Stop reason: SURG

## 2017-06-02 RX ORDER — SODIUM CHLORIDE 0.9 % (FLUSH) 0.9 %
1-10 SYRINGE (ML) INJECTION AS NEEDED
Status: DISCONTINUED | OUTPATIENT
Start: 2017-06-02 | End: 2017-06-02 | Stop reason: HOSPADM

## 2017-06-02 RX ORDER — BUPIVACAINE HYDROCHLORIDE AND EPINEPHRINE 2.5; 5 MG/ML; UG/ML
INJECTION, SOLUTION EPIDURAL; INFILTRATION; INTRACAUDAL; PERINEURAL AS NEEDED
Status: DISCONTINUED | OUTPATIENT
Start: 2017-06-02 | End: 2017-06-02 | Stop reason: HOSPADM

## 2017-06-02 RX ORDER — ACETAMINOPHEN 10 MG/ML
1000 INJECTION, SOLUTION INTRAVENOUS ONCE
Status: COMPLETED | OUTPATIENT
Start: 2017-06-02 | End: 2017-06-02

## 2017-06-02 RX ORDER — LORAZEPAM 1 MG/1
1 TABLET ORAL EVERY 12 HOURS PRN
Status: DISCONTINUED | OUTPATIENT
Start: 2017-06-02 | End: 2017-06-04 | Stop reason: HOSPADM

## 2017-06-02 RX ORDER — ACETAMINOPHEN 10 MG/ML
1000 INJECTION, SOLUTION INTRAVENOUS EVERY 6 HOURS
Status: COMPLETED | OUTPATIENT
Start: 2017-06-02 | End: 2017-06-03

## 2017-06-02 RX ORDER — FENTANYL CITRATE 50 UG/ML
50 INJECTION, SOLUTION INTRAMUSCULAR; INTRAVENOUS
Status: DISCONTINUED | OUTPATIENT
Start: 2017-06-02 | End: 2017-06-02 | Stop reason: HOSPADM

## 2017-06-02 RX ORDER — SODIUM CHLORIDE 9 MG/ML
INJECTION, SOLUTION INTRAVENOUS AS NEEDED
Status: DISCONTINUED | OUTPATIENT
Start: 2017-06-02 | End: 2017-06-02 | Stop reason: HOSPADM

## 2017-06-02 RX ORDER — CLONIDINE HYDROCHLORIDE 0.1 MG/1
0.1 TABLET ORAL EVERY 6 HOURS PRN
Status: DISCONTINUED | OUTPATIENT
Start: 2017-06-02 | End: 2017-06-04 | Stop reason: HOSPADM

## 2017-06-02 RX ORDER — LIDOCAINE HYDROCHLORIDE 20 MG/ML
INJECTION, SOLUTION INFILTRATION; PERINEURAL AS NEEDED
Status: DISCONTINUED | OUTPATIENT
Start: 2017-06-02 | End: 2017-06-02 | Stop reason: SURG

## 2017-06-02 RX ORDER — PANTOPRAZOLE SODIUM 40 MG/10ML
40 INJECTION, POWDER, LYOPHILIZED, FOR SOLUTION INTRAVENOUS
Status: DISCONTINUED | OUTPATIENT
Start: 2017-06-02 | End: 2017-06-04 | Stop reason: HOSPADM

## 2017-06-02 RX ORDER — NEOSTIGMINE METHYLSULFATE 1 MG/ML
INJECTION, SOLUTION INTRAVENOUS AS NEEDED
Status: DISCONTINUED | OUTPATIENT
Start: 2017-06-02 | End: 2017-06-02 | Stop reason: SURG

## 2017-06-02 RX ORDER — SIMETHICONE 80 MG
80 TABLET,CHEWABLE ORAL 4 TIMES DAILY PRN
Status: DISCONTINUED | OUTPATIENT
Start: 2017-06-02 | End: 2017-06-04 | Stop reason: HOSPADM

## 2017-06-02 RX ORDER — SODIUM CHLORIDE AND POTASSIUM CHLORIDE 150; 450 MG/100ML; MG/100ML
125 INJECTION, SOLUTION INTRAVENOUS CONTINUOUS
Status: DISCONTINUED | OUTPATIENT
Start: 2017-06-03 | End: 2017-06-03

## 2017-06-02 RX ORDER — DEXAMETHASONE SODIUM PHOSPHATE 10 MG/ML
INJECTION, SOLUTION INTRAMUSCULAR; INTRAVENOUS AS NEEDED
Status: DISCONTINUED | OUTPATIENT
Start: 2017-06-02 | End: 2017-06-02 | Stop reason: SURG

## 2017-06-02 RX ORDER — HYDROCODONE BITARTRATE AND ACETAMINOPHEN 7.5; 325 MG/1; MG/1
1 TABLET ORAL EVERY 4 HOURS PRN
Status: DISCONTINUED | OUTPATIENT
Start: 2017-06-02 | End: 2017-06-04 | Stop reason: HOSPADM

## 2017-06-02 RX ORDER — PANTOPRAZOLE SODIUM 40 MG/10ML
40 INJECTION, POWDER, LYOPHILIZED, FOR SOLUTION INTRAVENOUS ONCE
Status: COMPLETED | OUTPATIENT
Start: 2017-06-02 | End: 2017-06-02

## 2017-06-02 RX ORDER — SCOLOPAMINE TRANSDERMAL SYSTEM 1 MG/1
1 PATCH, EXTENDED RELEASE TRANSDERMAL ONCE
Status: DISCONTINUED | OUTPATIENT
Start: 2017-06-02 | End: 2017-06-02

## 2017-06-02 RX ORDER — LORAZEPAM 2 MG/ML
0.5 INJECTION INTRAMUSCULAR EVERY 12 HOURS PRN
Status: DISCONTINUED | OUTPATIENT
Start: 2017-06-02 | End: 2017-06-04 | Stop reason: HOSPADM

## 2017-06-02 RX ORDER — SODIUM CHLORIDE, SODIUM LACTATE, POTASSIUM CHLORIDE, CALCIUM CHLORIDE 600; 310; 30; 20 MG/100ML; MG/100ML; MG/100ML; MG/100ML
150 INJECTION, SOLUTION INTRAVENOUS CONTINUOUS
Status: DISCONTINUED | OUTPATIENT
Start: 2017-06-02 | End: 2017-06-04

## 2017-06-02 RX ORDER — CEFAZOLIN SODIUM 2 G/100ML
2 INJECTION, SOLUTION INTRAVENOUS ONCE
Status: COMPLETED | OUTPATIENT
Start: 2017-06-02 | End: 2017-06-02

## 2017-06-02 RX ADMIN — FENTANYL CITRATE 50 MCG: 50 INJECTION, SOLUTION INTRAMUSCULAR; INTRAVENOUS at 12:00

## 2017-06-02 RX ADMIN — CEFAZOLIN SODIUM 2 G: 2 INJECTION, SOLUTION INTRAVENOUS at 10:08

## 2017-06-02 RX ADMIN — DEXAMETHASONE SODIUM PHOSPHATE 2 MG: 10 INJECTION, SOLUTION INTRAMUSCULAR; INTRAVENOUS at 10:17

## 2017-06-02 RX ADMIN — BUPRENORPHINE HYDROCHLORIDE 150 MCG: 0.3 INJECTION INTRAMUSCULAR; INTRAVENOUS at 10:17

## 2017-06-02 RX ADMIN — ONDANSETRON 4 MG: 2 INJECTION INTRAMUSCULAR; INTRAVENOUS at 10:55

## 2017-06-02 RX ADMIN — ACETAMINOPHEN 1000 MG: 10 INJECTION, SOLUTION INTRAVENOUS at 11:14

## 2017-06-02 RX ADMIN — SODIUM CHLORIDE, POTASSIUM CHLORIDE, SODIUM LACTATE AND CALCIUM CHLORIDE: 600; 310; 30; 20 INJECTION, SOLUTION INTRAVENOUS at 07:11

## 2017-06-02 RX ADMIN — FENTANYL CITRATE 50 MCG: 50 INJECTION, SOLUTION INTRAMUSCULAR; INTRAVENOUS at 11:54

## 2017-06-02 RX ADMIN — EPHEDRINE SULFATE 12.5 MG: 50 INJECTION INTRAMUSCULAR; INTRAVENOUS; SUBCUTANEOUS at 10:24

## 2017-06-02 RX ADMIN — PROPOFOL 25 MCG/KG/MIN: 10 INJECTION, EMULSION INTRAVENOUS at 10:15

## 2017-06-02 RX ADMIN — SODIUM CHLORIDE, POTASSIUM CHLORIDE, SODIUM LACTATE AND CALCIUM CHLORIDE 150 ML/HR: 600; 310; 30; 20 INJECTION, SOLUTION INTRAVENOUS at 13:00

## 2017-06-02 RX ADMIN — ATRACURIUM BESYLATE 50 MG: 10 INJECTION, SOLUTION INTRAVENOUS at 10:11

## 2017-06-02 RX ADMIN — BUPRENORPHINE HYDROCHLORIDE 150 MCG: 0.3 INJECTION INTRAMUSCULAR; INTRAVENOUS at 10:15

## 2017-06-02 RX ADMIN — PROPOFOL 200 MG: 10 INJECTION, EMULSION INTRAVENOUS at 10:11

## 2017-06-02 RX ADMIN — CHLORHEXIDINE GLUCONATE 15 ML: 1.2 RINSE ORAL at 07:13

## 2017-06-02 RX ADMIN — BUPIVACAINE HYDROCHLORIDE 30 ML: 2.5 INJECTION, SOLUTION EPIDURAL; INFILTRATION; INTRACAUDAL; PERINEURAL at 10:15

## 2017-06-02 RX ADMIN — ROBINUL 0.2 MG: 0.2 INJECTION INTRAMUSCULAR; INTRAVENOUS at 10:38

## 2017-06-02 RX ADMIN — METFORMIN HYDROCHLORIDE 1000 MG: 1000 TABLET ORAL at 19:21

## 2017-06-02 RX ADMIN — ESMOLOL HYDROCHLORIDE 5 MG: 10 INJECTION, SOLUTION INTRAVENOUS at 10:47

## 2017-06-02 RX ADMIN — ATRACURIUM BESYLATE 10 MG: 10 INJECTION, SOLUTION INTRAVENOUS at 10:45

## 2017-06-02 RX ADMIN — ACETAMINOPHEN 1000 MG: 10 INJECTION, SOLUTION INTRAVENOUS at 19:21

## 2017-06-02 RX ADMIN — ESMOLOL HYDROCHLORIDE 5 MG: 10 INJECTION, SOLUTION INTRAVENOUS at 10:58

## 2017-06-02 RX ADMIN — LIDOCAINE HYDROCHLORIDE 50 MG: 20 INJECTION, SOLUTION INFILTRATION; PERINEURAL at 10:10

## 2017-06-02 RX ADMIN — ROBINUL 0.4 MG: 0.2 INJECTION INTRAMUSCULAR; INTRAVENOUS at 11:21

## 2017-06-02 RX ADMIN — BUPIVACAINE HYDROCHLORIDE 30 ML: 2.5 INJECTION, SOLUTION EPIDURAL; INFILTRATION; INTRACAUDAL; PERINEURAL at 10:17

## 2017-06-02 RX ADMIN — NEOSTIGMINE METHYLSULFATE 2.5 MG: 1 INJECTION, SOLUTION INTRAVENOUS at 11:21

## 2017-06-02 RX ADMIN — MORPHINE SULFATE 4 MG: 4 INJECTION, SOLUTION INTRAMUSCULAR; INTRAVENOUS at 14:58

## 2017-06-02 RX ADMIN — SCOPALAMINE 1 PATCH: 1 PATCH, EXTENDED RELEASE TRANSDERMAL at 09:48

## 2017-06-02 RX ADMIN — LIDOCAINE HYDROCHLORIDE 0.5 ML: 10 INJECTION, SOLUTION EPIDURAL; INFILTRATION; INTRACAUDAL; PERINEURAL at 07:13

## 2017-06-02 RX ADMIN — PANTOPRAZOLE SODIUM 40 MG: 40 INJECTION, POWDER, FOR SOLUTION INTRAVENOUS at 07:13

## 2017-06-02 RX ADMIN — SODIUM CHLORIDE 1000 ML: 9 INJECTION, SOLUTION INTRAVENOUS at 07:12

## 2017-06-02 RX ADMIN — DEXAMETHASONE SODIUM PHOSPHATE 2 MG: 10 INJECTION, SOLUTION INTRAMUSCULAR; INTRAVENOUS at 10:15

## 2017-06-02 RX ADMIN — ACETAMINOPHEN 1000 MG: 10 INJECTION, SOLUTION INTRAVENOUS at 23:21

## 2017-06-02 RX ADMIN — ESMOLOL HYDROCHLORIDE 5 MG: 10 INJECTION, SOLUTION INTRAVENOUS at 10:51

## 2017-06-02 RX ADMIN — CEFAZOLIN SODIUM 2 G: 2 INJECTION, SOLUTION INTRAVENOUS at 19:21

## 2017-06-02 NOTE — PROGRESS NOTES
Discharge Planning Assessment  [facility]     Patient Name: Nuris Ribeiro  MRN: [id number]  Today's Date: 6/2/2017    Admit Date: 6/2/2017          Discharge Needs Assessment       06/02/17 1419    Living Environment    Lives With spouse    Living Arrangements house    Home Accessibility stairs within home    Type of Financial/Environmental Concern none    Transportation Available car;family or friend will provide    Living Environment    Provides Primary Care For no one    Quality Of Family Relationships supportive;helpful;involved    Discharge Needs Assessment    Concerns To Be Addressed no discharge needs identified;denies needs/concerns at this time    Readmission Within The Last 30 Days no previous admission in last 30 days    Anticipated Changes Related to Illness none    Equipment Currently Used at Home none    Equipment Needed After Discharge none    Discharge Disposition home or self-care    Discharge Contact Information if Applicable Shai Ribeiro, spouse  [phone]            Discharge Plan       06/02/17 1421    Case Management/Social Work Plan    Plan Home    Patient/Family In Agreement With Plan yes    Additional Comments Spoke with patient at bedside regarding discharge planning.  Patient denies use of Home Health or DME.  Denies difficulty affording medications.  Patient lives with her  in a multilevel home with one step for access.  No anticipated discharge needs noted.  Patient plans to discharge home via car with family to transport when medically ready.        Discharge Placement     No information found        Expected Discharge Date and Time     Expected Discharge Date Expected Discharge Time    [place] 3, 2017               Demographic Summary       06/02/17 1410    Referral Information    Admission Type inpatient    Arrived From home or self-care    Referral Source admission list    Reason For Consult discharge planning    Record Reviewed clinical discipline documentation;history  and physical;patient profile    Primary Care Physician Information    Name Pa Milan DO            Functional Status       06/02/17 1411    Functional Status Current    Current Functional Level Comment Per Nursing Assessment    Functional Status Prior    Ambulation 0-->independent    Transferring 0-->independent    Toileting 0-->independent    Bathing 0-->independent    Dressing 0-->independent    Eating 0-->independent    Communication 0-->understands/communicates without difficulty    Swallowing 0-->swallows foods/liquids without difficulty    IADL    Medications independent    Meal Preparation independent    Housekeeping independent    Laundry independent    Shopping independent    Oral Care independent    Activity Tolerance    Current Activity Limitations lifting restrictions    Usual Activity Tolerance good    Current Activity Tolerance moderate    Employment/Financial    Employment/Finance Comments Medicare/[employer] Med Supp            Psychosocial     None            Abuse/Neglect     None            Legal     None            Substance Abuse     None            Patient Forms     None          Dianna Li RN

## 2017-06-02 NOTE — ANESTHESIA PROCEDURE NOTES
Peripheral Block    Patient location during procedure: pre-op  Reason for block: at surgeon's request and post-op pain management  Performed by  Anesthesiologist: LINDA BABB  Preanesthetic Checklist  Completed: patient identified, site marked, surgical consent, pre-op evaluation, timeout performed, IV checked, risks and benefits discussed and monitors and equipment checked  Peripheral Block Prep:  Sterile barriers:cap, gloves, sterile barriers and mask  Prep: ChloraPrep  Patient monitoring: blood pressure monitoring, continuous pulse oximetry and EKG  Peripheral Procedure  Guidance:ultrasound guided  Images:still images obtained    Laterality:Bilateral  Block Type:TAP  Injection Technique:single-shot  Needle Type:short-bevel  Needle Gauge:20 G    Medications  Comment:Block Injection:  LA dose divided between Right and Left block       Adjuncts:  Decadron 4mg PSF, Buprenex 0.3mg (Per total volume of LA)  Local Injected:bupivacaine 0.25% Local Amount Injected:60mL  Post Assessment  Injection Assessment: negative aspiration for heme, incremental injection and no paresthesia on injection  Patient Tolerance:comfortable throughout block  Complications:no  Additional Notes  The pt was placed in the Supine Position and was  anesthetized with general anesthesia.    Under Ultrasound guidance, a BBraun 4inch 360 degree needle was advanced with Normal Saline hydro dissection of tissue.  The Internal Oblique and Transversus Abdominus muscles where visualized.  At or before the aponeurosis of Internal Oblique, local anesthetic spread was visualized in the Transversus Abdominus Plane. Injection was made incrementally with aspiration every 5 mls.  There was no  intravascular injection,  injection pressure was normal, there was no neural injection, and the procedure was completed without difficulty.  Thank You.

## 2017-06-02 NOTE — PLAN OF CARE
Problem: Patient Care Overview (Adult)  Goal: Plan of Care Review  Outcome: Ongoing (interventions implemented as appropriate)    06/02/17 1414   Coping/Psychosocial Response Interventions   Plan Of Care Reviewed With patient   Patient Care Overview   Progress improving   Outcome Evaluation   Outcome Summary/Follow up Plan pt has no c/o at this time.          Problem: Bariatric Surgery (Open/Laparoscopic) (Adult,Pediatric)  Goal: Signs and Symptoms of Listed Potential Problems Will be Absent or Manageable (Bariatric Surgery)  Outcome: Ongoing (interventions implemented as appropriate)    06/02/17 1414   Bariatric Surgery (Open/Laparoscopic)   Problems Assessed (Bariatric Surgery) all   Problems Present (Bariatric Surgery) pain

## 2017-06-02 NOTE — ANESTHESIA POSTPROCEDURE EVALUATION
Patient: Nuris Ribeiro    Procedure Summary     Date Anesthesia Start Anesthesia Stop Room / Location    06/02/17 1008 1138  [facility] / [facility]       Procedure Diagnosis Surgeon Provider    GASTRIC SLEEVE LAPAROSCOPIC (N/A Abdomen) Obesity, Class III, BMI 40-49.9 (morbid obesity)  (Obesity, Class III, BMI 40-49.9 (morbid obesity) [E66.01]) MD Yamil Hammer MD          Anesthesia Type: general  Last vitals  BP      Temp      Pulse     Resp      SpO2        Post Anesthesia Care and Evaluation    Patient location during evaluation: PACU  Patient participation: complete - patient participated  Level of consciousness: awake and alert  Pain score: 2  Pain management: adequate  Airway patency: patent  Anesthetic complications: No anesthetic complications  PONV Status: none  Cardiovascular status: hemodynamically stable and acceptable  Respiratory status: nonlabored ventilation, acceptable and nasal cannula  Hydration status: acceptable

## 2017-06-02 NOTE — ANESTHESIA PROCEDURE NOTES
Airway  Urgency: elective    Airway not difficult    General Information and Staff    Patient location during procedure: OR    Indications and Patient Condition  Indications for airway management: airway protection    Preoxygenated: yes  Mask difficulty assessment: 1 - vent by mask    Final Airway Details  Final airway type: endotracheal airway      Successful airway: ETT  Cuffed: yes   Successful intubation technique: direct laryngoscopy  Facilitating devices/methods: intubating stylet  Endotracheal tube insertion site: oral  Blade: Whalen  Blade size: #2  ETT size: 7.5 mm  Cormack-Lehane Classification: grade IIb - view of arytenoids or posterior of glottis only  Placement verified by: chest auscultation and capnometry   Measured from: lips  ETT to lips (cm): 21  Number of attempts at approach: 1

## 2017-06-02 NOTE — OP NOTE
DATE OF PROCEDURE:  06/02/2017    PREOPERATIVE DIAGNOSIS:  Morbid obesity with multiple comorbidities.     POSTOPERATIVE DIAGNOSIS:  Morbid obesity with multiple comorbidities.     PROCEDURES PERFORMED:  1.  Laparoscopic sleeve gastrectomy (85% subtotal vertical gastrectomy) over a 36-Mauritian bougie dilator.   2.  Esophagogastroduodenoscopy.     SURGEON: Tiago Obregon MD    ASSISTANT:  Sherrill Rangel MD     ANESTHESIA: General endotracheal.     ESTIMATED BLOOD LOSS: 50 mL     FLUIDS: Crystalloid.     SPECIMENS: Subtotal gastrectomy.     DRAINS: None.     COUNTS: Correct.     COMPLICATIONS: None.     INDICATIONS: This is a 66-year-old morbidly obese white female who presents for elective laparoscopic sleeve gastrectomy. She has undergone our extensive preoperative education, teaching, and consent process. Everything is in order and she wishes to proceed.     DESCRIPTION OF PROCEDURE: The patient was brought to the operating room and placed supine upon the operating room table. SCD hoses were placed. She underwent uneventful general endotracheal anesthesia per the anesthesiology staff. She received IV Ancef.  Her AMOXICILLIN ALLERGY was noted and subcutaneous Lovenox. The anesthesiology staff performed a TAP block and her abdomen was prepped and draped with ChloraPrep in a sterile fashion. An Ioban was used as well. A Mon catheter was not placed. The peritoneal cavity was entered in the upper abdomen to the left of the midline using an 11 mm trocar and an Optiview technique and the abdomen was insufflated to a pressure of 15 mmHg with CO2 gas. Exploratory laparoscopy revealed no evidence of injury from the entrance technique and chronically incarcerated incisional hernia at the umbilicus with what appeared to be omentum. This was photo documented and left undisturbed, a significant rectus diastasis of fatty liver, no nodularity, a normal-appearing gallbladder. No other abnormalities were noted. The remaining  trocars were placed under direct visualization including an additional 11 mm trocar in the left midabdomen, 5 mm trocars in the right upper quadrant and left subcostal position, and in the upper abdomen to the right of midline away from the rectus diastasis, a 15 mm trocar was placed. Through a stab incision in the epigastrium, a Edwin retractor was used to elevate the left lobe of the liver exposing the hiatus, no visible hiatal hernia from the anterior view. Beginning approximately two-thirds of the way around the greater curvature of the stomach, the gastrocolic vessels were divided with the Harmonic scalpel. This proceeded proximally taking down all the short gastric vessels and exposing the left luzma along its length. There were no posterior hernias or lipomas. This was photo documented. Gastrocolic vessels were then divided medially to 5 cm proximal to the pylorus. The anesthesiology staff passed a 36-Monegasque blunt tip bougie dilator which was manipulated along the lesser curvature into the distal antrum. The 85% subtotal vertical sleeve gastrectomy was then performed over the 36-Monegasque bougie dilator using an echelon 60 mm articulating electric GST stapler. The 1st firing was a black load, the next 5 firings were green loads. All included a single absorbable Veritas mei-strip. After clamping down the final green load and prior to firing it, the bougie dilator was removed. The sleeve was performed such that it was uniform in size, no hour-glassing or narrowing, especially at the angularis, and the final firing was done a centimeter away from the Angle of Hiss to hopefully avoid incorporating esophageal fibers. The subtotal gastrectomy specimen was placed into a large retrieval bag and withdrawn and placed on a separate Libby stand. It was a slightly larger than average specimen. The sleeve was submerged under saline. I performed upper endoscopy. I advanced the endoscope into the duodenal bulb. No air  bubbles or leaks were seen, no bleeding from the staple line, no narrowing at the angularis, no pyloric spasm or deformity, no gastritis, no hiatal hernia, no Rodriguez's esophagus, and the endoscope was withdrawn.  I inspected the subtotal gastrectomy specimen. Staples were all well-formed confirming laparoscopic findings.  It was sent unopened to pathology for permanent section. Irrigation fluid was suctioned free. The sleeve was resting nicely and hemostatic. The sleeve staple line was treated with 4 mL of aerosolized Tisseel fibrin glue. Photo documentation of the sleeve was obtained. The Edwin retractor was removed. The fascia at the 15 mm trocar site incision was closed with a horizontal mattress 0 Vicryl suture placed with a suture passer under direct visualization and tying the knot extracorporeally. The fascia was infiltrated with approximately 10 mL of 0.25% Marcaine with epinephrine with the okay from anesthesia. The remaining trocars were removed under direct visualization. There was no bleeding noted from their sites. The subcutaneous tissue in the 15 mm incision was closed with a figure-of-eight 2-0 Vicryl plus suture and the skin in each incision was closed using 3-0 Monocryl plus in an interrupted subcuticular stitch followed by skin affix. The patient tolerated the procedure well without complication and was taken to the recovery room in stable condition.      MD SANA Hammer/taco  DD: 06/02/2017 11:31:12  DT: 06/02/2017 13:32:38  Voice Rec. ID #[id number]  Voice Original ID #[id number]  Doc ID #[id number]  Rev. #0  cc:

## 2017-06-02 NOTE — ANESTHESIA PREPROCEDURE EVALUATION
Anesthesia Evaluation     Patient summary reviewed and Nursing notes reviewed   NPO Solid Status: > 8 hours  NPO Liquid Status: > 8 hours     Airway   Mallampati: II  TM distance: <3 FB  Neck ROM: full  possible difficult intubation and anterior  Dental - normal exam     Pulmonary - normal exam   (+) shortness of breath,   Cardiovascular - normal exam    (+) hypertension, CAD, cardiac stents PVD, DVT,       Neuro/Psych- negative ROS  GI/Hepatic/Renal/Endo    (+) morbid obesity, chronic renal disease CRI, diabetes mellitus, hypothyroidism,     Musculoskeletal (-) negative ROS    Abdominal  - normal exam    Bowel sounds: normal.   Substance History - negative use     OB/GYN negative ob/gyn ROS         Other                                        Anesthesia Plan    ASA 3     general   (GLIDE SCOPE AVAILABLE  TAP BLOCKS FOR POST OP PAIN)  Anesthetic plan and risks discussed with patient.    Plan discussed with CRNA.

## 2017-06-02 NOTE — PROGRESS NOTES
Adult Nutrition  Assessment    Patient Name:  Nuris Ribeiro  YOB: 1950  MRN: [id number]  Admit Date:  6/2/2017    Assessment Date:  6/2/2017            Data Eval/ Notes       06/02/17 1713     Notes    Reason Patient Request    Note Request for info re K+ content of protein shots/shake. Provided.                            Comments:          Electronically signed by:  Corrine Spaulding RD  06/02/17 5:14 PM

## 2017-06-02 NOTE — BRIEF OP NOTE
GASTRIC SLEEVE LAPAROSCOPIC  Procedure Note    Nuris Ribeiro  6/2/2017    Pre-op Diagnosis:   Obesity, Class III, BMI 40-49.9 (morbid obesity) [E66.01]    Post-op Diagnosis:     Post-Op Diagnosis Codes:     * Obesity, Class III, BMI 40-49.9 (morbid obesity) [E66.01]    Procedure/CPT® Codes:  ID LAP, BINDU RESTRICT PROC, LONGITUDINAL GASTRECTOMY [98935]  ID ESOPHAGOGASTRODUODENOSCOPY TRANSORAL DIAGNOSTIC [64276]    Procedure(s):  GASTRIC SLEEVE LAPAROSCOPIC    Surgeon(s):  MD Sherrill Hammer MD    Anesthesia: General with Block    Staff:   Circulator: Shantell Nieto RN; Geovanna RODRIGEZ RN  Scrub Person: Janay Girard; Adrian Clarke  Nursing Assistant: Mabel Potts; Emily Baptiste    Estimated Blood Loss: *No blood loss documented*  Urine Voided: * No values recorded between 6/2/2017 10:09 AM and 6/2/2017 11:25 AM *    Specimens:                  ID Type Source Tests Collected by Time Destination   A : SUB-TOTAL GASTRECTOMY Tissue Stomach TISSUE EXAM Tiago Obregon MD 6/2/2017 1042          Drains:   Naso/Oral/Gastric Tube 06/02/17 1017 [place] sump;orogastric 18 center mouth (Active)           Findings:     Complications: none      Tiago Obregon MD     Date: 6/2/2017  Time: 11:25 AM

## 2017-06-02 NOTE — H&P (VIEW-ONLY)
[facility] BARIATRIC SURGERY  [address]  [facility] [id number] [phone]      Patient  Name:  Nuris Ribeiro.  :  1950      Date of Visit: 2017      Chief Complaint:  weight gain; unable to maintain weight loss.  Preop LSG    History of Present Illness:  Nuris Ribeiro is a 66 y.o. female who presents today for evaluation, education and consultation regarding weight loss surgery. The patient is interested in sleeve gastrectomy.     Nuris has been overweight for at least 40 years, has been 35 pounds or more overweight for at least 45 years, has been 100 pounds or more overweight for 10 or more years and started dieting at age 23.     Previous diet attempts include: Calorie Counting and Ethan's Diet; Weight Watchers. The most weight Nuris lost was 30 pounds on weight watchers but was only able to maintain that weight loss for 4mths.     Returns for final visit prior to LSG.  Dozed off repeatedly during d/c r/b/a rx.  Offered her to reschedule when more alert, says she heard everything and understands r/b/a rx and wants to proceed.  Says never considered testing for ETELVINA bc no one suggested it - I told her to get tested.  Says went through WLS process here a few years ago, but in w/u found to have CAD and stented ( Dr. Buck - see below) and told had to wait a year.  Lost interest.  Says interested now bc her insulin is costing her $2000 every 3 months since on Mcare - is using samples and novolog to get around the cost.  Says has a ring on her finger that can't be removed - her last procedure they were able to get it off with dental floss.  I told her that likely her surgery would be cancelled if it wasn't removed prior to coming in for her LSG - no guarantee anyone in preop will know how to remove it.      Past Medical History:   Diagnosis Date   • Abnormal CXR     worsening chronic changes, see report   • CKD (chronic kidney disease), stage  "III     BUN/Cr 21/1.40 gfr 38  followed by nephrology   • Coronary artery disease     s/p stenting 2008, on ASA/Plavix, followed by Dr. Buck   • Diabetes mellitus     Dx 30+ yrs ago, thinks insulin last 15+ years.  \"I have not been a good diabetic\" - says didn't check her sugars for years.  A1c >9   • Diastasis of rectus abdominis     massive   • Dyslipidemia    • Dyspepsia     rare. no QUEENIE sx's or RUQ pains.  serum h. pyl neg   • Dyspnea on exertion    • Fatigue    • Gangrene     (R) 5th toe amputated   • Gout     recently started on allopurinol but doesn't know why   • History of DVT (deep vein thrombosis)     years ago, etiology unclear, tx w/ Warfarin   • Hypertension    • Hypothyroidism    • Leukocytosis     13.98, asx   • Morbid obesity    • Peripheral edema    • PVD (peripheral vascular disease)     w/ cellulitis of RLE 1/2017 tx w/ Vanc/Rocephin   • Venous stasis dermatitis     R>L, with phelbitis s/p Rocephin/Flagyl 1/17   • Ventral hernia     periumbilical assoc w large rectus diastasis, chronic incarc   • Vitamin D deficiency      Past Surgical History:   Procedure Laterality Date   • CORONARY ANGIOPLASTY WITH STENT PLACEMENT  2008   • TOE AMPUTATION Right 2015    5th toe, d/t gangrene   • TONSILLECTOMY  1967     Allergies   Allergen Reactions   • Amoxicillin Rash     can tolerate Rocephin       Current Outpatient Prescriptions:   •  allopurinol (ZYLOPRIM) 100 MG tablet, Daily., Disp: , Rfl:   •  aspirin 81 MG tablet, Take  by mouth., Disp: , Rfl:   •  cholecalciferol (VITAMIN D3) 1000 UNITS tablet, Take 1,000 Units by mouth Daily., Disp: , Rfl:   •  clopidogrel (PLAVIX) 75 MG tablet, Take  by mouth., Disp: , Rfl:   •  insulin detemir (LEVEMIR) 100 UNIT/ML injection, Inject  under the skin., Disp: , Rfl:   •  Insulin Lispro (HUMALOG KWIKPEN) 100 UNIT/ML solution pen-injector, Inject  under the skin. 35 units am, 35 units lunch, 75 units at dinner, Disp: , Rfl:   •  Insulin Lispro, Human, 100 UNIT/ML " "solution pen-injector, Inject  under the skin., Disp: , Rfl:   •  Insulin Syringe-Needle U-100 31G X 5/16\" 1 ML misc, , Disp: , Rfl:   •  levothyroxine (SYNTHROID, LEVOTHROID) 112 MCG tablet, Take  by mouth., Disp: , Rfl:   •  lisinopril (PRINIVIL,ZESTRIL) 20 MG tablet, Take  by mouth., Disp: , Rfl:   •  metFORMIN (GLUCOPHAGE) 1000 MG tablet, Take 1,000 mg by mouth 2 (two) times a day with meals., Disp: , Rfl:   •  Multiple Vitamin (MULTI VITAMIN PO), Take  by mouth., Disp: , Rfl:   •  simvastatin (ZOCOR) 40 MG tablet, Take  by mouth., Disp: , Rfl:   No current facility-administered medications for this visit.      Social History     Social History   • Marital status:      Spouse name: N/A   • Number of children: N/A   • Years of education: N/A     Occupational History   • Not on file.     Social History Main Topics   • Smoking status: Never Smoker   • Smokeless tobacco: Never Used   • Alcohol use No   • Drug use: No   • Sexual activity: Not on file     Other Topics Concern   • Not on file     Social History Narrative    Patient is  with 2 adult children and lives in [place]. 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      Family History   Problem Relation Age of Onset   • Hypertension Mother    • Coronary artery disease Mother    • Macular degeneration Mother    • Other Mother      DYSLIPIDEMIA   • Leukemia Father    • Hypertension Father    • Diabetes Sister    • Stroke Sister    • Hypertension Sister    • Heart disease Maternal Grandfather          Review of Systems:  Constitutional:  The patient reports fatigue, weight gain and denies fevers and chills.  Cardiovascular:  The patient reports HTN, HLD, heart disease, edema and denies MI.  Respiratory:  The patient denies asthma, apnea and PE.  Gastrointestinal:  The patient denies heartburn, pancreatitis and liver disease.  Genitourinary:  The patient reports renal insufficiency.    "   Musculoskeletal:  The patient denies joint pain, fibromyalgia and arthritis.  Neurological:  The patient denies seizure and stroke.  Psychiatric:  The patient denies anxiety, depression and bipolar disorder.  Endocrine:  The patient reports diabetes, thyroid disease.  Hematologic:  The patient denies anemia and bleeding disorder.  Skin:  The patient denies MRSA.    Physical Exam:  Vital Signs:  Weight: 290 lb 8 oz (132 kg)   Body mass index is 44.17 kg/(m^2).  Temp: 98.4 °F (36.9 °C)   Heart Rate: 84   BP: 144/69     Physical Exam   Constitutional: She is oriented to person, place, and time. She appears well-developed and well-nourished.   Odor - ? Fungal    HENT:   Head: Normocephalic and atraumatic.   Eyes: Conjunctivae are normal. No scleral icterus.   Neck: Neck supple. Carotid bruit is not present. No thyromegaly present.   Cardiovascular: Normal rate and regular rhythm.    No murmur heard.  Pulmonary/Chest: Effort normal and breath sounds normal. No respiratory distress. She has no wheezes. She has no rales.   Abdominal: Soft. Bowel sounds are normal. She exhibits no distension and no mass. There is hepatosplenomegaly. There is no tenderness. A hernia (umbilical ) is present. Hernia confirmed positive in the ventral area.       diastasis recti massive with associated large chronic incarc periumbo hernia, cannot exclude larger ventral hernia.    no surgical xcars   Musculoskeletal: Normal range of motion. Edema: w/(B) venous stasis changes    Neurological: She is alert and oriented to person, place, and time. Gait normal.   Skin: Skin is warm and dry. No rash noted.   Bluish red discoloration LE's bilat R> L, c/w venous stasis, no tree-barking   Psychiatric: She has a normal mood and affect. Judgment normal.   Vitals reviewed.         Patient Active Problem List   Diagnosis   • Peripheral vascular disease   • Gangrene   • Cellulitis of right lower extremity without foot   • Morbid obesity due to excess  "calories   • Diabetes type 2, uncontrolled   • CAD (coronary artery disease)   • HTN (hypertension)   • Hypothyroid   • CKD stage 3 due to type 2 diabetes mellitus   • Vitamin D deficiency   • Coronary artery disease   • Dyslipidemia   • Hypertension   • Peripheral edema   • PVD (peripheral vascular disease)   • Diabetes mellitus   • Hypothyroidism   • Fatigue   • Dyspnea on exertion   • Morbid obesity   • CKD (chronic kidney disease)       Assessment:    Nuris Ribeiro is a 66 y.o. year old female with medically complicated obesity pursuing sleeve gastrectomy.    Weight loss surgery is deemed medically necessary given the following obesity related comorbidities including diabetes, hypertension and dyslipidemia with current Weight: 290 lb 8 oz (132 kg) and Body mass index is 44.17 kg/(m^2)..    Patient is aware that surgery is a tool, and that weight loss is not guaranteed but only seen in the context of appropriate use, follow up and exercise.    Complications  of laparoscopic/possible robotic gastric sleeve were discussed. The patient is well aware of the potential complications of surgery that include but not limited to bleeding, infections, deep venous thrombosis, pulmonary embolism, pulmonary complications such as pneumonia, cardiac events, hernias, small bowel obstruction, damage to the spleen or other organs, bowel injury, disfiguring scars, failure to lose weight, need for additional surgery, conversion to an open procedure, and death. Patient is also aware of complications which apply in this particular procedure that can include but are not limited to a \"leak\" at the staple line which in some instances may require conversion to gastric bypass.    Greater than 10 minutes was spent with the patient discussing avoiding all tobacco products and second hand smoke at least 2 weeks pre-operatively and 6 weeks post-operatively to minimize the risk of sleeve leak     Discussed the risks, benefits and " alternative therapies at great length as outlined in our extensive consent forms, consent videos, and educational teaching process under the direction of the center's .    A copy of the patient's signed informed consent is on file.    R/B/A Rx discussed to postop anticoagulation incl but not limited to bleeding, drug reaction, venothromboembolic events, etc. and agreeable to taking post op  Eliquis 2.5 mg po Q 12 hrs #28.    With hx stents, r/b/a rx discussed and agreeable to holding ASA/Plavix 1 wk prior and 2 wks post op - to minimize risk leak, normally hold for 6 wks post op, but trying to walk between rock and hard place regarding a leak vs. AMI/stent occlusion.  Aware this has worked in the past but certainly no guarantee she won't leak and/or get an MI/stent occlusion.  Eliquis for those 2 wks post op as above. Plan full dose PPI post op to hopefully avoid leak while on ASA/Plavix.    Clearly a high risk patient - adv age, poorly controlled long-standing IDDM, CAD, prob unDx ETELVINA, CKD, etc.  Seems as if her motivation is financial - aware that no guarantee with long-standing insulin dept DM that insulin requirement will decrease after LSG.  Has not shown great concern for her medical condition in the past (not taking care of her DM, requiring an amputation, etc).      Aware I will likely not repair her ventral hernia at the time of LSG as it would prolong OR times (incr risk) and have a high recurrence rate until she lowers her BMI.    Plan:  The consultation plan and program requirements were reviewed with the patient.  The patient has been advised that a letter of medical support must be obtained from her primary care physician or referring provider. A psychological evaluation will be arranged.  A nutritional evaluation will be performed.  The patient was advised to start a high protein and low carbohydrate diet.  Necessary lifestyle modifications were discussed.  Instructions on how to  access JAIDA was given to the patient.  JAIDA is an internet based educational video that explains the surgical procedure chosen and answers basic questions regarding that procedure.     Preoperative testing will include: CBC, CMP, Fasting Lipids, TSH, HgA1C, H.Pylori, CXR and EKG     Preop clearances required prior to surgery will include Cardiac and Renal.    Patient understands that bariatric surgery is not cosmetic surgery but rather a tool to help make a lifelong commitment to lifestyle changes including diet, exercise, behavior modifications, and healthy habits.      Tiago Obregon MD

## 2017-06-03 ENCOUNTER — APPOINTMENT (OUTPATIENT)
Dept: GENERAL RADIOLOGY | Facility: HOSPITAL | Age: 67
End: 2017-06-03

## 2017-06-03 LAB
ALBUMIN SERPL-MCNC: 3.7 G/DL (ref 3.2–4.8)
ALBUMIN/GLOB SERPL: 1.2 G/DL (ref 1.5–2.5)
ALP SERPL-CCNC: 56 U/L (ref 25–100)
ALT SERPL W P-5'-P-CCNC: 19 U/L (ref 7–40)
ANION GAP SERPL CALCULATED.3IONS-SCNC: 8 MMOL/L (ref 3–11)
ANION GAP SERPL CALCULATED.3IONS-SCNC: 9 MMOL/L (ref 3–11)
ARTERIAL PATENCY WRIST A: ABNORMAL
AST SERPL-CCNC: 18 U/L (ref 0–33)
ATMOSPHERIC PRESS: ABNORMAL MMHG
BASE EXCESS BLDA CALC-SCNC: -8.5 MMOL/L (ref 0–2)
BASOPHILS # BLD AUTO: 0.01 10*3/MM3 (ref 0–0.2)
BASOPHILS NFR BLD AUTO: 0.1 % (ref 0–1)
BDY SITE: ABNORMAL
BILIRUB SERPL-MCNC: 0.3 MG/DL (ref 0.3–1.2)
BILIRUB UR QL STRIP: NEGATIVE
BUN BLD-MCNC: 37 MG/DL (ref 9–23)
BUN BLD-MCNC: 48 MG/DL (ref 9–23)
BUN/CREAT SERPL: 26.4 (ref 7–25)
BUN/CREAT SERPL: 32 (ref 7–25)
CALCIUM SPEC-SCNC: 9.4 MG/DL (ref 8.7–10.4)
CALCIUM SPEC-SCNC: 9.5 MG/DL (ref 8.7–10.4)
CHLORIDE SERPL-SCNC: 107 MMOL/L (ref 99–109)
CHLORIDE SERPL-SCNC: 110 MMOL/L (ref 99–109)
CLARITY UR: CLEAR
CO2 BLDA-SCNC: 18 MMOL/L (ref 22–33)
CO2 SERPL-SCNC: 17 MMOL/L (ref 20–31)
CO2 SERPL-SCNC: 18 MMOL/L (ref 20–31)
COHGB MFR BLD: 1.1 % (ref 0–2)
COLOR UR: YELLOW
CREAT BLD-MCNC: 1.4 MG/DL (ref 0.6–1.3)
CREAT BLD-MCNC: 1.5 MG/DL (ref 0.6–1.3)
DEPRECATED RDW RBC AUTO: 45.3 FL (ref 37–54)
EOSINOPHIL # BLD AUTO: 0.02 10*3/MM3 (ref 0.1–0.3)
EOSINOPHIL NFR BLD AUTO: 0.1 % (ref 0–3)
ERYTHROCYTE [DISTWIDTH] IN BLOOD BY AUTOMATED COUNT: 13.9 % (ref 11.3–14.5)
GFR SERPL CREATININE-BSD FRML MDRD: 35 ML/MIN/1.73
GFR SERPL CREATININE-BSD FRML MDRD: 38 ML/MIN/1.73
GLOBULIN UR ELPH-MCNC: 3.2 GM/DL
GLUCOSE BLD-MCNC: 241 MG/DL (ref 70–100)
GLUCOSE BLD-MCNC: 260 MG/DL (ref 70–100)
GLUCOSE BLDC GLUCOMTR-MCNC: 197 MG/DL (ref 70–130)
GLUCOSE BLDC GLUCOMTR-MCNC: 203 MG/DL (ref 70–130)
GLUCOSE BLDC GLUCOMTR-MCNC: 206 MG/DL (ref 70–130)
GLUCOSE BLDC GLUCOMTR-MCNC: 209 MG/DL (ref 70–130)
GLUCOSE BLDC GLUCOMTR-MCNC: 225 MG/DL (ref 70–130)
GLUCOSE UR STRIP-MCNC: ABNORMAL MG/DL
HBA1C MFR BLD: 7.6 % (ref 4.8–5.6)
HCO3 BLDA-SCNC: 16.9 MMOL/L (ref 20–26)
HCT VFR BLD AUTO: 43.1 % (ref 34.5–44)
HCT VFR BLD CALC: 43.7 %
HGB BLD-MCNC: 14.3 G/DL (ref 11.5–15.5)
HGB BLDA-MCNC: 14.3 G/DL (ref 14–18)
HGB UR QL STRIP.AUTO: NEGATIVE
HOROWITZ INDEX BLD+IHG-RTO: 21 %
IMM GRANULOCYTES # BLD: 0.1 10*3/MM3 (ref 0–0.03)
IMM GRANULOCYTES NFR BLD: 0.5 % (ref 0–0.6)
IRON 24H UR-MRATE: 33 MCG/DL (ref 50–175)
KETONES UR QL STRIP: ABNORMAL
LEUKOCYTE ESTERASE UR QL STRIP.AUTO: NEGATIVE
LYMPHOCYTES # BLD AUTO: 1.91 10*3/MM3 (ref 0.6–4.8)
LYMPHOCYTES NFR BLD AUTO: 9.8 % (ref 24–44)
MCH RBC QN AUTO: 29.6 PG (ref 27–31)
MCHC RBC AUTO-ENTMCNC: 33.2 G/DL (ref 32–36)
MCV RBC AUTO: 89.2 FL (ref 80–99)
METHGB BLD QL: 0.8 % (ref 0–1.5)
MODALITY: ABNORMAL
MONOCYTES # BLD AUTO: 1.04 10*3/MM3 (ref 0–1)
MONOCYTES NFR BLD AUTO: 5.4 % (ref 0–12)
NEUTROPHILS # BLD AUTO: 16.33 10*3/MM3 (ref 1.5–8.3)
NEUTROPHILS NFR BLD AUTO: 84.1 % (ref 41–71)
NITRITE UR QL STRIP: NEGATIVE
OXYHGB MFR BLDV: 93.5 % (ref 94–99)
PCO2 BLDA: 33.6 MM HG (ref 35–45)
PH BLDA: 7.31 PH UNITS (ref 7.35–7.45)
PH UR STRIP.AUTO: <=5 [PH] (ref 5–8)
PLATELET # BLD AUTO: 452 10*3/MM3 (ref 150–450)
PMV BLD AUTO: 10.6 FL (ref 6–12)
PO2 BLDA: 75.5 MM HG (ref 83–108)
POTASSIUM BLD-SCNC: 5.3 MMOL/L (ref 3.5–5.5)
POTASSIUM BLD-SCNC: 6.1 MMOL/L (ref 3.5–5.5)
PROT SERPL-MCNC: 6.9 G/DL (ref 5.7–8.2)
PROT UR QL STRIP: NEGATIVE
RBC # BLD AUTO: 4.83 10*6/MM3 (ref 3.89–5.14)
SODIUM BLD-SCNC: 134 MMOL/L (ref 132–146)
SODIUM BLD-SCNC: 135 MMOL/L (ref 132–146)
SP GR UR STRIP: 1.02 (ref 1–1.03)
UROBILINOGEN UR QL STRIP: ABNORMAL
WBC NRBC COR # BLD: 19.41 10*3/MM3 (ref 3.5–10.8)

## 2017-06-03 PROCEDURE — 80053 COMPREHEN METABOLIC PANEL: CPT | Performed by: SURGERY

## 2017-06-03 PROCEDURE — 83540 ASSAY OF IRON: CPT | Performed by: SURGERY

## 2017-06-03 PROCEDURE — 25010000002 MORPHINE PER 10 MG: Performed by: SURGERY

## 2017-06-03 PROCEDURE — 0 DIATRIZOATE MEGLUMINE & SODIUM PER 1 ML: Performed by: SURGERY

## 2017-06-03 PROCEDURE — 25010000002 ENOXAPARIN PER 10 MG: Performed by: SURGERY

## 2017-06-03 PROCEDURE — 25010000002 THIAMINE PER 100 MG: Performed by: SURGERY

## 2017-06-03 PROCEDURE — 25010000002 PYRIDOXINE PER 100 MG: Performed by: SURGERY

## 2017-06-03 PROCEDURE — 94799 UNLISTED PULMONARY SVC/PX: CPT

## 2017-06-03 PROCEDURE — 25010000002 FUROSEMIDE PER 20 MG: Performed by: INTERNAL MEDICINE

## 2017-06-03 PROCEDURE — 82805 BLOOD GASES W/O2 SATURATION: CPT | Performed by: INTERNAL MEDICINE

## 2017-06-03 PROCEDURE — 74241: CPT

## 2017-06-03 PROCEDURE — 25010000002 NA FERRIC GLUC CPLX PER 12.5 MG: Performed by: SURGERY

## 2017-06-03 PROCEDURE — 25010000002 ONDANSETRON PER 1 MG: Performed by: SURGERY

## 2017-06-03 PROCEDURE — 83036 HEMOGLOBIN GLYCOSYLATED A1C: CPT | Performed by: INTERNAL MEDICINE

## 2017-06-03 PROCEDURE — 85025 COMPLETE CBC W/AUTO DIFF WBC: CPT | Performed by: SURGERY

## 2017-06-03 PROCEDURE — 99219 PR INITIAL OBSERVATION CARE/DAY 50 MINUTES: CPT | Performed by: INTERNAL MEDICINE

## 2017-06-03 PROCEDURE — 25010000003 CEFAZOLIN IN DEXTROSE 2-4 GM/100ML-% SOLUTION: Performed by: SURGERY

## 2017-06-03 PROCEDURE — 63710000001 INSULIN LISPRO (HUMAN) PER 5 UNITS: Performed by: INTERNAL MEDICINE

## 2017-06-03 PROCEDURE — 82962 GLUCOSE BLOOD TEST: CPT

## 2017-06-03 PROCEDURE — 36600 WITHDRAWAL OF ARTERIAL BLOOD: CPT | Performed by: INTERNAL MEDICINE

## 2017-06-03 PROCEDURE — 25010000002 CYANOCOBALAMIN PER 1000 MCG: Performed by: SURGERY

## 2017-06-03 PROCEDURE — 99024 POSTOP FOLLOW-UP VISIT: CPT | Performed by: SURGERY

## 2017-06-03 PROCEDURE — 81003 URINALYSIS AUTO W/O SCOPE: CPT | Performed by: SURGERY

## 2017-06-03 RX ORDER — SODIUM CHLORIDE 450 MG/100ML
125 INJECTION, SOLUTION INTRAVENOUS CONTINUOUS
Status: DISCONTINUED | OUTPATIENT
Start: 2017-06-03 | End: 2017-06-03

## 2017-06-03 RX ORDER — SODIUM CHLORIDE 9 MG/ML
250 INJECTION, SOLUTION INTRAVENOUS CONTINUOUS
Status: DISCONTINUED | OUTPATIENT
Start: 2017-06-03 | End: 2017-06-03

## 2017-06-03 RX ORDER — SODIUM CHLORIDE 9 MG/ML
125 INJECTION, SOLUTION INTRAVENOUS CONTINUOUS
Status: DISCONTINUED | OUTPATIENT
Start: 2017-06-03 | End: 2017-06-04

## 2017-06-03 RX ORDER — SODIUM CHLORIDE 450 MG/100ML
125 INJECTION, SOLUTION INTRAVENOUS CONTINUOUS
Status: ACTIVE | OUTPATIENT
Start: 2017-06-03 | End: 2017-06-04

## 2017-06-03 RX ORDER — NICOTINE POLACRILEX 4 MG
15 LOZENGE BUCCAL
Status: DISCONTINUED | OUTPATIENT
Start: 2017-06-03 | End: 2017-06-04 | Stop reason: HOSPADM

## 2017-06-03 RX ORDER — FUROSEMIDE 10 MG/ML
40 INJECTION INTRAMUSCULAR; INTRAVENOUS ONCE
Status: COMPLETED | OUTPATIENT
Start: 2017-06-03 | End: 2017-06-03

## 2017-06-03 RX ORDER — DEXTROSE MONOHYDRATE 25 G/50ML
25 INJECTION, SOLUTION INTRAVENOUS
Status: DISCONTINUED | OUTPATIENT
Start: 2017-06-03 | End: 2017-06-04 | Stop reason: HOSPADM

## 2017-06-03 RX ADMIN — ACETAMINOPHEN 1000 MG: 10 INJECTION, SOLUTION INTRAVENOUS at 05:14

## 2017-06-03 RX ADMIN — Medication 45 ML: at 11:26

## 2017-06-03 RX ADMIN — MORPHINE SULFATE 4 MG: 4 INJECTION, SOLUTION INTRAMUSCULAR; INTRAVENOUS at 07:43

## 2017-06-03 RX ADMIN — CYANOCOBALAMIN 1000 MCG: 1000 INJECTION, SOLUTION INTRAMUSCULAR; SUBCUTANEOUS at 07:43

## 2017-06-03 RX ADMIN — THIAMINE HYDROCHLORIDE 250 ML/HR: 100 INJECTION, SOLUTION INTRAMUSCULAR; INTRAVENOUS at 07:43

## 2017-06-03 RX ADMIN — ONDANSETRON 4 MG: 2 INJECTION INTRAMUSCULAR; INTRAVENOUS at 10:37

## 2017-06-03 RX ADMIN — ALLOPURINOL 100 MG: 100 TABLET ORAL at 11:25

## 2017-06-03 RX ADMIN — SODIUM CHLORIDE 250 ML/HR: 9 INJECTION, SOLUTION INTRAVENOUS at 11:21

## 2017-06-03 RX ADMIN — HYDROCODONE BITARTRATE AND ACETAMINOPHEN 1 TABLET: 7.5; 325 TABLET ORAL at 21:58

## 2017-06-03 RX ADMIN — HYDROCODONE BITARTRATE AND ACETAMINOPHEN 1 TABLET: 7.5; 325 TABLET ORAL at 16:17

## 2017-06-03 RX ADMIN — CEFAZOLIN SODIUM 2 G: 2 INJECTION, SOLUTION INTRAVENOUS at 01:46

## 2017-06-03 RX ADMIN — ENOXAPARIN SODIUM 40 MG: 40 INJECTION SUBCUTANEOUS at 11:25

## 2017-06-03 RX ADMIN — INSULIN LISPRO 3 UNITS: 100 INJECTION, SOLUTION INTRAVENOUS; SUBCUTANEOUS at 21:53

## 2017-06-03 RX ADMIN — INSULIN LISPRO 3 UNITS: 100 INJECTION, SOLUTION INTRAVENOUS; SUBCUTANEOUS at 14:01

## 2017-06-03 RX ADMIN — INSULIN LISPRO 3 UNITS: 100 INJECTION, SOLUTION INTRAVENOUS; SUBCUTANEOUS at 17:15

## 2017-06-03 RX ADMIN — LEVOTHYROXINE SODIUM 112 MCG: 112 TABLET ORAL at 05:14

## 2017-06-03 RX ADMIN — SODIUM CHLORIDE 125 ML/HR: 4.5 INJECTION, SOLUTION INTRAVENOUS at 13:24

## 2017-06-03 RX ADMIN — PANTOPRAZOLE SODIUM 40 MG: 40 INJECTION, POWDER, FOR SOLUTION INTRAVENOUS at 05:14

## 2017-06-03 RX ADMIN — LISINOPRIL 20 MG: 20 TABLET ORAL at 11:30

## 2017-06-03 RX ADMIN — METFORMIN HYDROCHLORIDE 1000 MG: 1000 TABLET ORAL at 11:25

## 2017-06-03 RX ADMIN — Medication 45 ML: at 11:21

## 2017-06-03 RX ADMIN — FUROSEMIDE 40 MG: 10 INJECTION, SOLUTION INTRAMUSCULAR; INTRAVENOUS at 14:01

## 2017-06-03 RX ADMIN — SODIUM CHLORIDE 125 MG: 9 INJECTION, SOLUTION INTRAVENOUS at 12:09

## 2017-06-03 NOTE — PROGRESS NOTES
Cc:  POD#1  In chair she looks and feels well.  She is tolerating her diet without nausea or vomiting.  She is ambulating and voiding a lot no bowel movement or flatus.  No pulmonary complaints incentive spirometer 1750 witnessed  No fever or tachycardia pulse 53 blood pressure 134/57 and [id number]  She is in no apparent distress.  Abdomen is soft, nontender, nondistended, bowel sounds present.  Wounds look okay   Repeat BMP glucose 260 BUNs 37 creatinine 1.40 CO2 17 chloride 110 GFR 38 room air blood gas 7.311 PCO2 33.6 PO2 76 x 6 a -8.5 urinalysis shows a pH of less than 5a specific gravity of 1.018 500 mg/dL glucose and trace ketones  CMP this morning glucose 241 BUNs 48 creatinine 1.5 potassium 6.1 CO2 18 GFR 35  Per GI unremarkable    Impression: Clinically doing okay but probable diabetic ketoacidosis. HyperK+  Plan: Internal medicine consultation appreciated their assistance continue liquids, out of bed, pulmonary toilet, VTE proph.  See orders

## 2017-06-03 NOTE — CONSULTS
Hospital Medicine Consult    Date of Admission: 6/2/2017  Date of Consult: 06/03/17    Primary Care Physician: Pa Milan DO  Referring Physician:     Chief complaint/Reason for consultation management of medical issues.    Subjective   Resting in a chair in no acute distress and tells me she is an overall comfortable.  Denies any fever or chills.  No chest pain or palpitation.  No cough or shortness of breath at rest.  No nausea, vomiting, diarrhea, abdominal pain.  History of Present Illness   Patient is a 66-year-old  female with past medical history significant for diabetes mellitus, hypertension, hyperlipidemia, hypothyroidism, coronary artery disease status post stents.  Patient was admitted by Dr. Obregon and the she has a sleeve operation done yesterday with patient.  Today and patient has some abnormal labs and particularly and potassium of 6.1.  Patient has multiple medical issues as mentioned above and medicine was consulted for management of those issues.  Currently patient is in no acute distress.  Tells me that before admission she was in her usual state of health and with no sign of any acute illness.    Review of Systems   Otherwise complete ROS is negative except as mentioned above.    Past Medical History:  has a past medical history of Abnormal CXR; CKD (chronic kidney disease), stage III; Coronary artery disease; Diabetes mellitus; Diastasis of rectus abdominis; Dyslipidemia; Dyspepsia; Dyspnea on exertion; Fatigue; Gangrene; Gout; History of DVT (deep vein thrombosis); Hypertension; Hypothyroidism; Leukocytosis; Morbid obesity; Peripheral edema; PVD (peripheral vascular disease); Venous stasis dermatitis; Ventral hernia; Vitamin D deficiency; and Wears glasses.    Past Surgical History:  has a past surgical history that includes Tonsillectomy (1967); Coronary angioplasty with stent (2008); Toe amputation (Right, 2015); Breast biopsy; and Colonoscopy.    Family History: family  history includes Coronary artery disease in her mother; Diabetes in her sister; Heart disease in her maternal grandfather; Hypertension in her father, mother, and sister; Leukemia in her father; Macular degeneration in her mother; Other in her mother; Stroke in her sister.    Social History:  reports that she has never smoked. She has never used smokeless tobacco. She reports that she does not drink alcohol or use illicit drugs.    Medications: Scheduled Meds:  allopurinol 100 mg Oral Daily   enoxaparin 40 mg Subcutaneous Daily   furosemide 40 mg Intravenous Once   insulin lispro 0-7 Units Subcutaneous 4x Daily With Meals & Nightly   levothyroxine 112 mcg Oral Q AM   pantoprazole 40 mg Intravenous Q AM     Continuous Infusions:  lactated ringers 150 mL/hr Last Rate: Stopped (06/03/17 1131)   sodium chloride 125 mL/hr    sodium chloride 125 mL/hr      PRN Meds:.•  albuterol  •  CloNIDine  •  dextrose  •  dextrose  •  diphenhydrAMINE  •  glucagon (human recombinant)  •  HYDROcodone-acetaminophen  •  HYDROmorphone  •  LORazepam  •  LORazepam  •  metoclopramide  •  Morphine **AND** naloxone  •  Morphine **AND** naloxone  •  ondansetron **OR** ondansetron  •  phenol  •  prochlorperazine  •  promethazine **OR** promethazine  •  simethicone  Allergies   Allergen Reactions   • Amoxicillin Rash     can tolerate Rocephin       Objective    Physical Exam:   Vital Signs have been reviewed  Physical Exam  General: Resting in bed no acute distress.  Neurologic exam: Awake, alert, oriented ×3, no focal weakness or numbness present.  HEENT: PERRLA.  Neck: Supple, no palpable lymphadenopathy present.  Lungs: Clear to auscultation bilaterally, breathing is without labor.  Cardiovascular: S1 and S2 present and normal.  Regular rate and rhythm.  No murmur, gallop, rubs audible.  GI: Abdomen is very obese, soft, slight incisional tenderness but no rebound guarding or rebound.  Bowel sounds are sluggish but present.  Extremities: Morbid  obesity, skin discoloration of the lower extremities bilaterally secondary to chronic stasis and edema.    Results Reviewed:  I have personally reviewed current lab, radiology, and data and agree with results.    Results from last 7 days  Lab Units 06/03/17  0826   WBC 10*3/mm3 19.41*   HEMOGLOBIN g/dL 14.3   PLATELETS 10*3/mm3 452*       Results from last 7 days  Lab Units 06/03/17  0826   SODIUM mmol/L 134   POTASSIUM mmol/L 6.1*   TOTAL CO2 mmol/L 18.0*   BUN mg/dL 48*   CREATININE mg/dL 1.50*   GLUCOSE mg/dL 241*   CALCIUM mg/dL 9.4           Active Problems:    Obesity, Class III, BMI 40-49.9 (morbid obesity)        Assessment/Plan   Assessment & Plan  * Hyperkalemia.  Currently patient is on metformin and also lisinopril.  Patient is asymptomatic.    * Diabetes mellitus, hemoglobin A1c is unknown.    * Chronic kidney disease.  Patient tells me that she follows with Dr. Kaur's group.  Currently creatinine is 1.5, BUN is 48 and estimated GFR is about 35.    * Coronary artery disease status post stent placement.  Patient follows with Dr. Buck  * Hypertension, reasonably controlled.    * Hyperlipidemia.    * Hypothyroidism.  PLAN:  -Change IV fluid to half-normal saline.  - Lasix to reduce potassium.  - Repeat labs at 3:00.  - Also repeat labs in the morning.  - Start patient on sliding scale insulin.  - Check hemoglobin and fasting lipids in the morning.    I discussed the patients findings and my recommendations with the patient.   Thank you Dr. Obregon for allowing us to participate in the care of this patient and we will  follow the patient.      Corwin Kumar MD  06/03/17  1:14 PM    Consults

## 2017-06-03 NOTE — PLAN OF CARE
Problem: Bariatric Surgery (Open/Laparoscopic) (Adult,Pediatric)  Goal: Signs and Symptoms of Listed Potential Problems Will be Absent or Manageable (Bariatric Surgery)  Outcome: Ongoing (interventions implemented as appropriate)    06/03/17 0412   Bariatric Surgery (Open/Laparoscopic)   Problems Assessed (Bariatric Surgery) all   Problems Present (Bariatric Surgery) pain

## 2017-06-03 NOTE — PLAN OF CARE
Problem: Patient Care Overview (Adult)  Goal: Plan of Care Review  Outcome: Ongoing (interventions implemented as appropriate)    06/03/17 1416   Coping/Psychosocial Response Interventions   Plan Of Care Reviewed With patient   Patient Care Overview   Progress progress toward functional goals as expected   Outcome Evaluation   Outcome Summary/Follow up Plan Pt progresing well post surgry. Today potassium came back elevated. Pt given fluids and lasix to draw out pootassium. waitng for decision on d/c today vs tomorrow         Problem: Bariatric Surgery (Open/Laparoscopic) (Adult,Pediatric)  Goal: Signs and Symptoms of Listed Potential Problems Will be Absent or Manageable (Bariatric Surgery)  Outcome: Ongoing (interventions implemented as appropriate)    06/03/17 1416   Bariatric Surgery (Open/Laparoscopic)   Problems Assessed (Bariatric Surgery) all   Problems Present (Bariatric Surgery) pain;impaired glycemic control

## 2017-06-04 VITALS
TEMPERATURE: 98 F | OXYGEN SATURATION: 96 % | WEIGHT: 290 LBS | BODY MASS INDEX: 43.95 KG/M2 | DIASTOLIC BLOOD PRESSURE: 62 MMHG | RESPIRATION RATE: 8 BRPM | HEIGHT: 68 IN | SYSTOLIC BLOOD PRESSURE: 132 MMHG | HEART RATE: 72 BPM

## 2017-06-04 LAB
ALBUMIN SERPL-MCNC: 4 G/DL (ref 3.2–4.8)
ALBUMIN/GLOB SERPL: 1.2 G/DL (ref 1.5–2.5)
ALP SERPL-CCNC: 53 U/L (ref 25–100)
ALT SERPL W P-5'-P-CCNC: 8 U/L (ref 7–40)
ANION GAP SERPL CALCULATED.3IONS-SCNC: 6 MMOL/L (ref 3–11)
ANION GAP SERPL CALCULATED.3IONS-SCNC: 7 MMOL/L (ref 3–11)
ARTICHOKE IGE QN: 50 MG/DL (ref 0–130)
AST SERPL-CCNC: 9 U/L (ref 0–33)
BASOPHILS # BLD AUTO: 0.04 10*3/MM3 (ref 0–0.2)
BASOPHILS NFR BLD AUTO: 0.3 % (ref 0–1)
BILIRUB SERPL-MCNC: 0.4 MG/DL (ref 0.3–1.2)
BUN BLD-MCNC: 32 MG/DL (ref 9–23)
BUN BLD-MCNC: 33 MG/DL (ref 9–23)
BUN/CREAT SERPL: 23.6 (ref 7–25)
BUN/CREAT SERPL: 26.7 (ref 7–25)
CALCIUM SPEC-SCNC: 10.3 MG/DL (ref 8.7–10.4)
CALCIUM SPEC-SCNC: 9.6 MG/DL (ref 8.7–10.4)
CHLORIDE SERPL-SCNC: 102 MMOL/L (ref 99–109)
CHLORIDE SERPL-SCNC: 106 MMOL/L (ref 99–109)
CHOLEST SERPL-MCNC: 100 MG/DL (ref 0–200)
CO2 SERPL-SCNC: 21 MMOL/L (ref 20–31)
CO2 SERPL-SCNC: 25 MMOL/L (ref 20–31)
CREAT BLD-MCNC: 1.2 MG/DL (ref 0.6–1.3)
CREAT BLD-MCNC: 1.4 MG/DL (ref 0.6–1.3)
D-LACTATE SERPL-SCNC: 1.8 MMOL/L (ref 0.5–2)
DEPRECATED RDW RBC AUTO: 47.4 FL (ref 37–54)
EOSINOPHIL # BLD AUTO: 0.35 10*3/MM3 (ref 0.1–0.3)
EOSINOPHIL NFR BLD AUTO: 2.3 % (ref 0–3)
ERYTHROCYTE [DISTWIDTH] IN BLOOD BY AUTOMATED COUNT: 14.3 % (ref 11.3–14.5)
GFR SERPL CREATININE-BSD FRML MDRD: 38 ML/MIN/1.73
GFR SERPL CREATININE-BSD FRML MDRD: 45 ML/MIN/1.73
GLOBULIN UR ELPH-MCNC: 3.3 GM/DL
GLUCOSE BLD-MCNC: 237 MG/DL (ref 70–100)
GLUCOSE BLD-MCNC: 242 MG/DL (ref 70–100)
GLUCOSE BLDC GLUCOMTR-MCNC: 198 MG/DL (ref 70–130)
GLUCOSE BLDC GLUCOMTR-MCNC: 214 MG/DL (ref 70–130)
GLUCOSE BLDC GLUCOMTR-MCNC: 218 MG/DL (ref 70–130)
GLUCOSE BLDC GLUCOMTR-MCNC: 223 MG/DL (ref 70–130)
HCT VFR BLD AUTO: 44.3 % (ref 34.5–44)
HDLC SERPL-MCNC: 25 MG/DL (ref 40–60)
HGB BLD-MCNC: 14.3 G/DL (ref 11.5–15.5)
IMM GRANULOCYTES # BLD: 0.05 10*3/MM3 (ref 0–0.03)
IMM GRANULOCYTES NFR BLD: 0.3 % (ref 0–0.6)
LYMPHOCYTES # BLD AUTO: 3.52 10*3/MM3 (ref 0.6–4.8)
LYMPHOCYTES NFR BLD AUTO: 22.8 % (ref 24–44)
MAGNESIUM SERPL-MCNC: 1.6 MG/DL (ref 1.3–2.7)
MCH RBC QN AUTO: 29.3 PG (ref 27–31)
MCHC RBC AUTO-ENTMCNC: 32.3 G/DL (ref 32–36)
MCV RBC AUTO: 90.8 FL (ref 80–99)
MONOCYTES # BLD AUTO: 0.92 10*3/MM3 (ref 0–1)
MONOCYTES NFR BLD AUTO: 5.9 % (ref 0–12)
NEUTROPHILS # BLD AUTO: 10.59 10*3/MM3 (ref 1.5–8.3)
NEUTROPHILS NFR BLD AUTO: 68.4 % (ref 41–71)
PHOSPHATE SERPL-MCNC: 2.4 MG/DL (ref 2.4–5.1)
PLATELET # BLD AUTO: 431 10*3/MM3 (ref 150–450)
PMV BLD AUTO: 10.1 FL (ref 6–12)
POTASSIUM BLD-SCNC: 4.8 MMOL/L (ref 3.5–5.5)
POTASSIUM BLD-SCNC: 5.2 MMOL/L (ref 3.5–5.5)
PROT SERPL-MCNC: 7.3 G/DL (ref 5.7–8.2)
RBC # BLD AUTO: 4.88 10*6/MM3 (ref 3.89–5.14)
SODIUM BLD-SCNC: 133 MMOL/L (ref 132–146)
SODIUM BLD-SCNC: 134 MMOL/L (ref 132–146)
TRIGL SERPL-MCNC: 157 MG/DL (ref 0–150)
WBC NRBC COR # BLD: 15.47 10*3/MM3 (ref 3.5–10.8)

## 2017-06-04 PROCEDURE — 63710000001 INSULIN LISPRO (HUMAN) PER 5 UNITS: Performed by: INTERNAL MEDICINE

## 2017-06-04 PROCEDURE — 99024 POSTOP FOLLOW-UP VISIT: CPT | Performed by: SURGERY

## 2017-06-04 PROCEDURE — 63710000001 INSULIN DETEMIR PER 5 UNITS: Performed by: INTERNAL MEDICINE

## 2017-06-04 PROCEDURE — 83735 ASSAY OF MAGNESIUM: CPT | Performed by: INTERNAL MEDICINE

## 2017-06-04 PROCEDURE — 80053 COMPREHEN METABOLIC PANEL: CPT | Performed by: SURGERY

## 2017-06-04 PROCEDURE — 85025 COMPLETE CBC W/AUTO DIFF WBC: CPT | Performed by: SURGERY

## 2017-06-04 PROCEDURE — 80061 LIPID PANEL: CPT | Performed by: INTERNAL MEDICINE

## 2017-06-04 PROCEDURE — 83605 ASSAY OF LACTIC ACID: CPT | Performed by: INTERNAL MEDICINE

## 2017-06-04 PROCEDURE — 84100 ASSAY OF PHOSPHORUS: CPT | Performed by: INTERNAL MEDICINE

## 2017-06-04 PROCEDURE — 82962 GLUCOSE BLOOD TEST: CPT

## 2017-06-04 PROCEDURE — 25010000002 ENOXAPARIN PER 10 MG: Performed by: SURGERY

## 2017-06-04 PROCEDURE — 99226 PR SBSQ OBSERVATION CARE/DAY 35 MINUTES: CPT | Performed by: INTERNAL MEDICINE

## 2017-06-04 PROCEDURE — 25010000002 FUROSEMIDE PER 20 MG: Performed by: INTERNAL MEDICINE

## 2017-06-04 RX ORDER — SODIUM CHLORIDE 450 MG/100ML
125 INJECTION, SOLUTION INTRAVENOUS CONTINUOUS
Status: ACTIVE | OUTPATIENT
Start: 2017-06-04 | End: 2017-06-04

## 2017-06-04 RX ORDER — OMEPRAZOLE 40 MG/1
40 CAPSULE, DELAYED RELEASE ORAL 2 TIMES DAILY
Qty: 60 CAPSULE | Refills: 1 | Status: SHIPPED | OUTPATIENT
Start: 2017-06-04 | End: 2017-07-10 | Stop reason: SDUPTHER

## 2017-06-04 RX ORDER — FUROSEMIDE 10 MG/ML
40 INJECTION INTRAMUSCULAR; INTRAVENOUS ONCE
Status: COMPLETED | OUTPATIENT
Start: 2017-06-04 | End: 2017-06-04

## 2017-06-04 RX ORDER — HYDROCODONE BITARTRATE AND ACETAMINOPHEN 7.5; 325 MG/1; MG/1
1 TABLET ORAL EVERY 4 HOURS PRN
Qty: 30 TABLET | Refills: 0 | Status: SHIPPED | OUTPATIENT
Start: 2017-06-04 | End: 2017-06-09

## 2017-06-04 RX ORDER — DEXTROSE MONOHYDRATE 25 G/50ML
25 INJECTION, SOLUTION INTRAVENOUS
Status: DISCONTINUED | OUTPATIENT
Start: 2017-06-04 | End: 2017-06-04 | Stop reason: HOSPADM

## 2017-06-04 RX ORDER — NICOTINE POLACRILEX 4 MG
15 LOZENGE BUCCAL
Status: DISCONTINUED | OUTPATIENT
Start: 2017-06-04 | End: 2017-06-04 | Stop reason: HOSPADM

## 2017-06-04 RX ADMIN — INSULIN DETEMIR 15 UNITS: 100 INJECTION, SOLUTION SUBCUTANEOUS at 10:01

## 2017-06-04 RX ADMIN — ENOXAPARIN SODIUM 40 MG: 40 INJECTION SUBCUTANEOUS at 10:00

## 2017-06-04 RX ADMIN — INSULIN LISPRO 4 UNITS: 100 INJECTION, SOLUTION INTRAVENOUS; SUBCUTANEOUS at 18:02

## 2017-06-04 RX ADMIN — INSULIN LISPRO 7 UNITS: 100 INJECTION, SOLUTION INTRAVENOUS; SUBCUTANEOUS at 07:30

## 2017-06-04 RX ADMIN — INSULIN LISPRO 3 UNITS: 100 INJECTION, SOLUTION INTRAVENOUS; SUBCUTANEOUS at 12:40

## 2017-06-04 RX ADMIN — LEVOTHYROXINE SODIUM 112 MCG: 112 TABLET ORAL at 06:05

## 2017-06-04 RX ADMIN — ALLOPURINOL 100 MG: 100 TABLET ORAL at 10:00

## 2017-06-04 RX ADMIN — PANTOPRAZOLE SODIUM 40 MG: 40 INJECTION, POWDER, FOR SOLUTION INTRAVENOUS at 06:05

## 2017-06-04 RX ADMIN — INSULIN LISPRO 3 UNITS: 100 INJECTION, SOLUTION INTRAVENOUS; SUBCUTANEOUS at 07:59

## 2017-06-04 RX ADMIN — FUROSEMIDE 40 MG: 10 INJECTION, SOLUTION INTRAMUSCULAR; INTRAVENOUS at 10:00

## 2017-06-04 RX ADMIN — SODIUM CHLORIDE 125 ML/HR: 4.5 INJECTION, SOLUTION INTRAVENOUS at 03:47

## 2017-06-04 NOTE — PROGRESS NOTES
Continued Stay Note  [facility]     Patient Name: Nuris Ribeiro  MRN: [id number]  Today's Date: 6/4/2017    Admit Date: 6/2/2017          Discharge Plan       06/04/17 1005    Case Management/Social Work Plan    Patient/Family In Agreement With Plan yes    Additional Comments Met with pt at bedside to discuss assistance with insulin costs.  Pt has a [employer] Part D plan.  Pt is currently on Humalog and was supposed to also use Levemir, however, the cost was approx $2000, which was not affordable and therefore she was changed to 70/30 insulin and Humalog.  Per MD, pt will be DCd on Humalog and Levemir.  CM will provide pt with 1 month supply of Levemir per indigent program.  Pt relates that she does not need any Humalog as she has an adequate supply at home.  Also provided pt with info for pt assistance programs for both Humalog and Levemir.  Pt will need to contact these programs and enroll herself, if eligible.  CM will cont to follow.              Discharge Codes     None        Expected Discharge Date and Time     Expected Discharge Date Expected Discharge Time    [place] 3, 2017             Poonam Deleon

## 2017-06-04 NOTE — PROGRESS NOTES
Hospitalist Daily Progress Note    Date of Admission: 6/2/2017   LOS: 0 days   PCP: Pa Milan DO    Chief Complaint: hyperkalemia.    Subjective    Feels OK, no specific complaint. No CP, no SOB or cough, no N/V/D, no abdominal pain. No F/C.  History of Present Illness    Review of Systems  General: no fevers, chills  Respiratory: no cough, dyspnea  Cardiovascular: no chest pain, palpitations  Abdomen: No abdominal pain, nausea, vomiting, or diarrhea  Neurologic: No focal weakness    Objective   Physical Exam:  I have reviewed the vital signs.  Temp:  [97.8 °F (36.6 °C)-98 °F (36.7 °C)] 98 °F (36.7 °C)  Heart Rate:  [53-72] 72  Resp:  [8-16] 8  BP: (132-134)/(57-62) 132/62    Objective  General Appearance:    Alert, cooperative, no distress  Head:    Normocephalic, atraumatic  Eyes:    Sclerae anicteric  Neck:   Supple, no mass  Lungs: Clear to auscultation bilaterally, respirations unlabored  Heart: Regular rate and rhythm, S1 and S2 normal, no murmur, rub or gallop  Abdomen: obese,  Soft, non-tender, bowel sounds active, nondistended  Extremities: No clubbing, cyanosis, or edema to lower extremities  Skin: No rashes   Neurologic: Oriented x3, Normal strength to extremities    Results Review:    I have reviewed the labs, radiology results and diagnostic studies.      Results from last 7 days  Lab Units 06/04/17  0601   WBC 10*3/mm3 15.47*   HEMOGLOBIN g/dL 14.3   PLATELETS 10*3/mm3 431       Results from last 7 days  Lab Units 06/04/17  1357   SODIUM mmol/L 134   POTASSIUM mmol/L 4.8   TOTAL CO2 mmol/L 25.0   CREATININE mg/dL 1.40*   GLUCOSE mg/dL 242*       I have reviewed the medications.    ---------------------------------------------------------------------------------------------  Assessment/Plan   Assessment & Plan  Assessment/Problem List    Active Problems:    Obesity, Class III, BMI 40-49.9 (morbid obesity)       Plan  Assessment & Plan    * Hyperkalemia,  Resolved.     * Diabetes mellitus,  hemoglobin A1c is 7.6. Patient was on insulin and also metformin.     * Chronic kidney disease. Patient tells me that she follows with Dr. Kaur's group. Currently creatinine is 1.5, BUN is 48 and estimated GFR is about 35.  * mild metabolic acidosis, resolved.     * Coronary artery disease status post stent placement. Patient follows with Dr. Buck    * Hypertension, reasonably controlled.     * Hyperlipidemia.     * Hypothyroidism.    * morbid obesity.  PLAN:  - Had a long conversation with the patient is morning.  We are going to discontinue lisinopril and metformin at this point.  Patient will be started on Levemir and Humalog.  - Okay to discharge the patient from medicine standpoint.  Continue the antihypertensive medication and also insulin regimen as she is on currently.  - Patient needs to follow-up with the primary care physician within 3 days to check the electrolytes.  Also insulin needs to be adjusted.  -In light of chronic kidney disease we do not recommend continuation of metformin.      DVT prophylaxis:  Discharge Planning: I expect patient to be discharged to home when ok with surgery.    Corwin Kumar MD 06/04/17 3:25 PM

## 2017-06-04 NOTE — PROGRESS NOTES
Cc:  Addendum  Plan is to resume asa/plavix in 2 weeks while on PPI and hopefully avoid a leak or AMI/stent occlusion, pt voiced understanding again confirming the r/b/a Rx d/c at length in the office

## 2017-06-04 NOTE — PROGRESS NOTES
Cc:  POD#2  He looks and feels well she's up in a chair.  She is tolerating her diet without nausea or vomiting.  No bowel movement or flatus.  No pulmonary complaints.  She says she is ambulating and voiding well.  She was seen by Dr. Reggei Jimenez earlier and he plans on sending her home on a sliding scale according to the patient.  No fever or tachycardia pulse 72 blood pressure 132/62 urine output [id number]    On exam she is in no apparent distress abdomen is soft and benign wounds look okay bowel sounds are normoactive.  CMP normal except for glucose of 237 BUNs 32 GFR is 45 BUN/creatinine are now 32 and 1.20 CO2 is 21 white blood count 15.5 with 68 segs which is normal 23 lymphs 6 monocytes no bands H&H 14 and 44.3  Formal upper GI report unremarkable    Impression: Doing okay    Plan: Discharge home.  She says she has Lovenox injections at home because the Eliquis is too expensive.  Insulin management etc. per the hospitalist.  See orders

## 2017-06-04 NOTE — PLAN OF CARE
Problem: Patient Care Overview (Adult)  Goal: Adult Individualization and Mutuality  Outcome: Ongoing (interventions implemented as appropriate)    06/04/17 0410   Mutuality/Individual Preferences   What Information Would Help Us Give You More Personalized Care? Pt likes to sit up in chair, states the bed is comfortable         Problem: Bariatric Surgery (Open/Laparoscopic) (Adult,Pediatric)  Goal: Signs and Symptoms of Listed Potential Problems Will be Absent or Manageable (Bariatric Surgery)  Outcome: Ongoing (interventions implemented as appropriate)    06/04/17 0410   Bariatric Surgery (Open/Laparoscopic)   Problems Assessed (Bariatric Surgery) all   Problems Present (Bariatric Surgery) pain;impaired glycemic control

## 2017-06-05 LAB
CYTO UR: NORMAL
LAB AP CASE REPORT: NORMAL
LAB AP CLINICAL INFORMATION: NORMAL
Lab: NORMAL
PATH REPORT.FINAL DX SPEC: NORMAL
PATH REPORT.GROSS SPEC: NORMAL

## 2017-06-09 ENCOUNTER — OFFICE VISIT (OUTPATIENT)
Dept: BARIATRICS/WEIGHT MGMT | Facility: CLINIC | Age: 67
End: 2017-06-09

## 2017-06-09 VITALS
BODY MASS INDEX: 40.77 KG/M2 | SYSTOLIC BLOOD PRESSURE: 121 MMHG | TEMPERATURE: 97.5 F | RESPIRATION RATE: 22 BRPM | OXYGEN SATURATION: 98 % | HEART RATE: 102 BPM | WEIGHT: 269 LBS | HEIGHT: 68 IN | DIASTOLIC BLOOD PRESSURE: 73 MMHG

## 2017-06-09 DIAGNOSIS — Z98.84 S/P BARIATRIC SURGERY: Primary | ICD-10-CM

## 2017-06-09 PROCEDURE — 99024 POSTOP FOLLOW-UP VISIT: CPT | Performed by: PHYSICIAN ASSISTANT

## 2017-06-09 RX ORDER — AMLODIPINE BESYLATE 5 MG/1
5 TABLET ORAL DAILY
COMMUNITY
Start: 2017-06-05 | End: 2017-09-13 | Stop reason: HOSPADM

## 2017-06-09 RX ORDER — URSODIOL 300 MG/1
300 CAPSULE ORAL 2 TIMES DAILY
Qty: 60 CAPSULE | Refills: 5 | Status: SHIPPED | OUTPATIENT
Start: 2017-06-09 | End: 2017-07-09

## 2017-06-09 NOTE — PROGRESS NOTES
"[facility] Bariatric Surgery  [address]  [facility] [id number] [phone]      Patient Name:  Nuris Ribeiro.  :  1950      Date of Visit: 2017      Reason for Visit:  POD #7    HPI:  Nuris Ribeiro is a 66 y.o. female s/p LSG by GDW on 17    Doing well.  A little fatigue, but o/w no issues/concerns. Denies dysphagia, reflux, nausea, vomiting, abdominal pain, pulmonary issues and fevers.  Tolerating diet progression - on stage 1.  Getting 90g prot/day.  Drinking water, but says not yet getting 64 fluid oz/day.  Voiding w/out issue.  Taking MVI and Vit D.  On Omeprazole  and Lovenox .  Holding ASA  and Plavix.  Needs RX for Actigall.  Saw Endocrinology yesterday and Levemir and Humalog dosing adjusted.  Ambulating frequently w/out issue.     Presurgery weight: 290 pounds.  Today's weight is 269 lb (122 kg) pounds, today's  Body mass index is 40.9 kg/(m^2)., and her weight loss since surgery is 21 pounds.       Past Medical History:   Diagnosis Date   • Abnormal CXR     worsening chronic changes, see report   • CKD (chronic kidney disease), stage III     BUN/Cr .40 gfr 38  followed by nephrology   • Coronary artery disease     s/p stenting , on ASA/Plavix, followed by Dr. Buck   • Diabetes mellitus     Dx 30+ yrs ago, thinks insulin last 15+ years.  \"I have not been a good diabetic\" - says didn't check her sugars for years.  A1c >9   • Diastasis of rectus abdominis     massive   • Dyslipidemia    • Dyspepsia     rare. no QUEENIE sx's or RUQ pains.  serum h. pyl neg   • Dyspnea on exertion    • Fatigue    • Gangrene     (R) 5th toe amputated   • Gout     recently started on allopurinol but doesn't know why   • History of DVT (deep vein thrombosis)     years ago, etiology unclear, tx w/ Warfarin   • Hypertension    • Hypothyroidism    • Leukocytosis     13.98, asx   • Morbid obesity    • Peripheral edema    • PVD (peripheral vascular disease)     " w/ cellulitis of RLE 1/2017 tx w/ Vanc/Rocephin   • Venous stasis dermatitis     R>L, with phelbitis s/p Rocephin/Flagyl 1/17   • Ventral hernia     periumbilical assoc w large rectus diastasis, chronic incarc   • Vitamin D deficiency    • Wears glasses      Past Surgical History:   Procedure Laterality Date   • COLONOSCOPY  2015   • CORONARY ANGIOPLASTY WITH STENT PLACEMENT  2008   • GASTRIC SLEEVE LAPAROSCOPIC N/A 6/2/2017    Procedure: GASTRIC SLEEVE LAPAROSCOPIC;  Surgeon: Tiago Obregon MD;  Location: [facility];  Service:    • TOE AMPUTATION Right 2015    5th toe, d/t gangrene   • TONSILLECTOMY  1967     Outpatient Prescriptions Marked as Taking for the 6/9/17 encounter (Office Visit) with JM Pham   Medication Sig Dispense Refill   • allopurinol (ZYLOPRIM) 100 MG tablet 100 mg Daily.     • amLODIPine (NORVASC) 5 MG tablet      • cholecalciferol (VITAMIN D3) 1000 UNITS tablet Take 1,000 Units by mouth Daily.     • enoxaparin (LOVENOX) 40 MG/0.4ML solution syringe Inject 0.4 mL under the skin Daily for 14 days. 5.6 mL 0   • insulin detemir (LEVEMIR) 100 UNIT/ML injection Inject  under the skin Daily.     • insulin lispro (humaLOG) 100 UNIT/ML injection Inject  under the skin 3 (Three) Times a Day Before Meals.     • levothyroxine (SYNTHROID, LEVOTHROID) 112 MCG tablet 112 mcg. Takes two daily     • Multiple Vitamin (MULTI VITAMIN PO) Take 1 tablet by mouth Daily.     • omeprazole (PRILOSEC) 40 MG capsule Take 1 capsule by mouth 2 (Two) Times a Day for 60 days. 60 capsule 1   • simvastatin (ZOCOR) 40 MG tablet Take 40 mg by mouth.       Allergies   Allergen Reactions   • Amoxicillin Rash     can tolerate Rocephin       Social History     Social History   • Marital status:      Spouse name: N/A   • Number of children: N/A   • Years of education: N/A     Occupational History   • Not on file.     Social History Main Topics   • Smoking status: Never Smoker   • Smokeless tobacco: Never Used  "  • Alcohol use No   • Drug use: No   • Sexual activity: Defer     Other Topics Concern   • Not on file     Social History Narrative    Patient is  with 2 adult children and lives in [place]. 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        /73 (BP Location: Right arm, Patient Position: Sitting, Cuff Size: Large Adult)  Pulse 102  Temp 97.5 °F (36.4 °C) (Temporal Artery )   Resp 22  Ht 68\" (172.7 cm)  Wt 269 lb (122 kg)  SpO2 98%  BMI 40.9 kg/m2  Physical Exam   Constitutional: She appears well-developed and well-nourished. She is cooperative.   obese   HENT:   Mouth/Throat: Oropharynx is clear and moist and mucous membranes are normal.   Eyes: Conjunctivae are normal. No scleral icterus.   Cardiovascular: Normal rate.    Pulmonary/Chest: Effort normal.   Abdominal: Soft. Bowel sounds are normal. There is no tenderness.   Incisions healing well   Musculoskeletal: Normal range of motion. She exhibits no edema.   Neurological: She is alert.   Skin: Skin is warm and dry. No rash noted.   Psychiatric: She has a normal mood and affect. Judgment normal.         Assessment:   POD # 7 s/p LSG by GDW on 6/2/17      Plan:  Doing well. Continue to advance diet per manual.  Continue protein 100g/day.  Increase exercise/activity as tolerated.  OK to RTW but reviewed lifting restrictions, nothing >25 lbs x 2 more weeks.  Continue vitamins.  Actigall RX escribed.  Continue PPI.  Resume ASA/Plavix in another week while on PPI and hopefully avoid a leak or AMI/stent occlusion.  Continue to avoid NSAIDS/steroids x 6 weeks postop.  Call w/ problems/concerns.    The patient was instructed to follow up in 3 weeks, sooner if needed.   "

## 2017-06-13 DIAGNOSIS — E78.5 HYPERLIPIDEMIA, UNSPECIFIED HYPERLIPIDEMIA TYPE: ICD-10-CM

## 2017-06-13 DIAGNOSIS — R10.13 DYSPEPSIA: Primary | ICD-10-CM

## 2017-06-13 DIAGNOSIS — R06.00 DYSPNEA, UNSPECIFIED TYPE: ICD-10-CM

## 2017-06-13 DIAGNOSIS — R53.83 FATIGUE, UNSPECIFIED TYPE: ICD-10-CM

## 2017-06-30 ENCOUNTER — OFFICE VISIT (OUTPATIENT)
Dept: BARIATRICS/WEIGHT MGMT | Facility: CLINIC | Age: 67
End: 2017-06-30

## 2017-06-30 VITALS
RESPIRATION RATE: 18 BRPM | OXYGEN SATURATION: 99 % | HEIGHT: 68 IN | SYSTOLIC BLOOD PRESSURE: 120 MMHG | TEMPERATURE: 98.6 F | HEART RATE: 94 BPM | BODY MASS INDEX: 39.03 KG/M2 | DIASTOLIC BLOOD PRESSURE: 78 MMHG | WEIGHT: 257.5 LBS

## 2017-06-30 DIAGNOSIS — Z13.21 MALNUTRITION SCREEN: Primary | ICD-10-CM

## 2017-06-30 DIAGNOSIS — Z98.84 STATUS POST BARIATRIC SURGERY: ICD-10-CM

## 2017-06-30 DIAGNOSIS — E66.9 OBESITY, CLASS II, BMI 35-39.9: ICD-10-CM

## 2017-06-30 DIAGNOSIS — K91.2 POSTSURGICAL MALABSORPTION, NOT ELSEWHERE CLASSIFIED: ICD-10-CM

## 2017-06-30 PROCEDURE — 99024 POSTOP FOLLOW-UP VISIT: CPT | Performed by: SURGERY

## 2017-06-30 NOTE — PATIENT INSTRUCTIONS
Begin taking Miralax every morning.  May take up to 4x per day if needed.  Do not take dulcolax or senna laxative for any length of time.  May take Colace (docusate sodium) 100 mg twice daily if needed.    Use myfitnesspal.com as a resource to find how much protein is in different foods.      See the handbook for recommended vitamins.      Increase fluid intake to 64 oz.

## 2017-06-30 NOTE — PROGRESS NOTES
"[facility] Bariatric Surgery  [address]  [facility] [id number] [phone]      Patient Name:  Nuris Ribeiro.  :  1950      Date of Visit: 2017      Reason for Visit:  1 month postop    HPI:  Nuris Ribeiro is a 66 y.o. female s/p LSG by GDW on 17    Doing well.  A little feeling of food \"up to my neck\" when she drinks quickly (chugging protein shake, 11 oz over a few minutes).  Complains of some constipation.  She used to go every morning, now goes every 2-3 days.  Denies dysphagia, reflux, nausea, vomiting, abdominal pain, pulmonary issues and fevers.  Tolerating diet progression - on stage 4.  Getting 75-100g prot/day.  Drinking 48 fluid oz/day.  Taking MVI.  She said she isn't taking any of the other ones because nobody told her to.  Reviewed On Omeprazole  and Actigall .  Still holding NSAIDs , Tramadol, Hormones, Diuretics , Steroids and Immunologics.  Exercising.  Went back on ASA/Plavix at 2 weeks post-op given hx of cardiac stents.       Presurgery weight:  290 pounds. Today's weight is 257 lb 8 oz (117 kg) pounds, today's Body mass index is 39.15 kg/(m^2)., and her weight loss since surgery is 33 pounds.       Past Medical History:   Diagnosis Date   • Abnormal CXR     worsening chronic changes, see report   • CKD (chronic kidney disease), stage III     BUN/Cr .40 gfr 38  followed by nephrology   • Coronary artery disease     s/p stenting , on ASA/Plavix, followed by Dr. Buck   • Diabetes mellitus     Dx 30+ yrs ago, thinks insulin last 15+ years.  \"I have not been a good diabetic\" - says didn't check her sugars for years.  A1c >9   • Diastasis of rectus abdominis     massive   • Dyslipidemia    • Dyspepsia     rare. no QUEENIE sx's or RUQ pains.  serum h. pyl neg   • Dyspnea on exertion    • Fatigue    • Gangrene     (R) 5th toe amputated   • Gout     recently started on allopurinol but doesn't know why   • History of DVT (deep " vein thrombosis)     years ago, etiology unclear, tx w/ Warfarin   • Hypertension    • Hypothyroidism    • Leukocytosis     13.98, asx   • Morbid obesity    • Peripheral edema    • PVD (peripheral vascular disease)     w/ cellulitis of RLE 1/2017 tx w/ Vanc/Rocephin   • Venous stasis dermatitis     R>L, with phelbitis s/p Rocephin/Flagyl 1/17   • Ventral hernia     periumbilical assoc w large rectus diastasis, chronic incarc   • Vitamin D deficiency    • Wears glasses      Past Surgical History:   Procedure Laterality Date   • COLONOSCOPY  2015   • CORONARY ANGIOPLASTY WITH STENT PLACEMENT  2008   • GASTRIC SLEEVE LAPAROSCOPIC N/A 6/2/2017    Procedure: GASTRIC SLEEVE LAPAROSCOPIC;  Surgeon: Tiago Obregon MD;  Location: [facility];  Service:    • TOE AMPUTATION Right 2015    5th toe, d/t gangrene   • TONSILLECTOMY  1967     Outpatient Prescriptions Marked as Taking for the 6/30/17 encounter (Office Visit) with Sherrill Rangel MD   Medication Sig Dispense Refill   • allopurinol (ZYLOPRIM) 100 MG tablet 100 mg Daily.     • amLODIPine (NORVASC) 5 MG tablet      • cholecalciferol (VITAMIN D3) 1000 UNITS tablet Take 1,000 Units by mouth Daily.     • insulin detemir (LEVEMIR) 100 UNIT/ML injection Inject 30 Units under the skin 2 (Two) Times a Day.     • insulin lispro (humaLOG) 100 UNIT/ML injection Inject  under the skin 3 (Three) Times a Day Before Meals.     • levothyroxine (SYNTHROID, LEVOTHROID) 112 MCG tablet 112 mcg. Takes two daily     • Multiple Vitamin (MULTI VITAMIN PO) Take 1 tablet by mouth Daily.     • omeprazole (PRILOSEC) 40 MG capsule Take 1 capsule by mouth 2 (Two) Times a Day for 60 days. 60 capsule 1   • simvastatin (ZOCOR) 40 MG tablet Take 40 mg by mouth.     • ursodiol (ACTIGALL) 300 MG capsule Take 1 capsule by mouth 2 (Two) Times a Day for 30 days. 60 capsule 5     Allergies   Allergen Reactions   • Amoxicillin Rash     can tolerate Rocephin       Social History     Social History   •  "Marital status:      Spouse name: N/A   • Number of children: N/A   • Years of education: N/A     Occupational History   • Not on file.     Social History Main Topics   • Smoking status: Never Smoker   • Smokeless tobacco: Never Used   • Alcohol use No   • Drug use: No   • Sexual activity: Defer     Other Topics Concern   • Not on file     Social History Narrative    Patient is  with 2 adult children and lives in [place]. 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        /78 (BP Location: Left arm, Patient Position: Sitting, Cuff Size: Large Adult)  Pulse 94  Temp 98.6 °F (37 °C) (Temporal Artery )   Resp 18  Ht 68\" (172.7 cm)  Wt 257 lb 8 oz (117 kg)  SpO2 99%  BMI 39.15 kg/m2    Physical Exam   Constitutional: She is oriented to person, place, and time. She appears well-developed and well-nourished. No distress.   HENT:   Head: Normocephalic and atraumatic.   Right Ear: External ear normal.   Left Ear: External ear normal.   Eyes: EOM are normal. Pupils are equal, round, and reactive to light. No scleral icterus.   Neck: Normal range of motion. Neck supple. No thyromegaly present.   Abdominal: Soft. She exhibits no distension. There is no tenderness.   Incisions well-healing.  Two left-most port sites with scab and may have pulled apart a little.  No erythema or tenderness.   Neurological: She is alert and oriented to person, place, and time. No cranial nerve deficit.   Skin: Skin is warm and dry. She is not diaphoretic. No erythema.   Psychiatric: She has a normal mood and affect. Her behavior is normal. Judgment and thought content normal.         Assessment:  1 month s/p LSG by SANA on 6/2/17    Plan:  Doing well.  Continue to advance diet per manual.  Continue protein >70g/day.  Encouraged good food choices - high protein, low carb.  Continue routine exercise.  Routine bariatric labs ordered.  START TAKING RECOMMENDED vitamins w/ " adjustments pending lab results.  Start PRN Miralax for constipation.  Call w/ problems/concerns.    The patient was instructed to follow up in 2 months, sooner if needed.     Sherrill Rangel MD

## 2017-07-03 ENCOUNTER — TELEPHONE (OUTPATIENT)
Dept: BARIATRICS/WEIGHT MGMT | Facility: CLINIC | Age: 67
End: 2017-07-03

## 2017-07-05 LAB
25(OH)D3+25(OH)D2 SERPL-MCNC: 37.8 NG/ML
A-TOCOPHEROL VIT E SERPL-MCNC: 8.1 MG/L (ref 6.5–21.5)
ALBUMIN SERPL-MCNC: 3.7 G/DL (ref 3.2–4.8)
ALBUMIN/GLOB SERPL: 1.2 G/DL (ref 1.5–2.5)
ALP SERPL-CCNC: 78 U/L (ref 25–100)
ALT SERPL-CCNC: 19 U/L (ref 7–40)
AST SERPL-CCNC: 19 U/L (ref 0–33)
BASOPHILS # BLD AUTO: 0.04 10*3/MM3 (ref 0–0.2)
BASOPHILS NFR BLD AUTO: 0.3 % (ref 0–1)
BILIRUB SERPL-MCNC: 0.5 MG/DL (ref 0.3–1.2)
BUN SERPL-MCNC: 37 MG/DL (ref 9–23)
BUN/CREAT SERPL: 28.5 (ref 7–25)
CALCIUM SERPL-MCNC: 10.2 MG/DL (ref 8.7–10.4)
CHLORIDE SERPL-SCNC: 104 MMOL/L (ref 99–109)
CO2 SERPL-SCNC: 26 MMOL/L (ref 20–31)
CREAT SERPL-MCNC: 1.3 MG/DL (ref 0.6–1.3)
EOSINOPHIL # BLD AUTO: 0.42 10*3/MM3 (ref 0–0.3)
EOSINOPHIL NFR BLD AUTO: 3.4 % (ref 0–3)
ERYTHROCYTE [DISTWIDTH] IN BLOOD BY AUTOMATED COUNT: 14.3 % (ref 11.3–14.5)
FERRITIN SERPL-MCNC: 90 NG/ML (ref 10–291)
FOLATE SERPL-MCNC: >24 NG/ML (ref 3.2–20)
GLOBULIN SER CALC-MCNC: 3.1 GM/DL
GLUCOSE SERPL-MCNC: 185 MG/DL (ref 70–100)
HCT VFR BLD AUTO: 47.6 % (ref 34.5–44)
HGB BLD-MCNC: 15.6 G/DL (ref 11.5–15.5)
IMM GRANULOCYTES # BLD: 0.03 10*3/MM3 (ref 0–0.03)
IMM GRANULOCYTES NFR BLD: 0.2 % (ref 0–0.6)
IRON SERPL-MCNC: 76 MCG/DL (ref 50–175)
LYMPHOCYTES # BLD AUTO: 1.85 10*3/MM3 (ref 0.6–4.8)
LYMPHOCYTES NFR BLD AUTO: 14.8 % (ref 24–44)
MAGNESIUM SERPL-MCNC: 1.8 MG/DL (ref 1.3–2.7)
MCH RBC QN AUTO: 29.5 PG (ref 27–31)
MCHC RBC AUTO-ENTMCNC: 32.8 G/DL (ref 32–36)
MCV RBC AUTO: 90.2 FL (ref 80–99)
METHYLMALONATE SERPL-SCNC: 235 NMOL/L (ref 0–378)
MONOCYTES # BLD AUTO: 0.65 10*3/MM3 (ref 0–1)
MONOCYTES NFR BLD AUTO: 5.2 % (ref 0–12)
NEUTROPHILS # BLD AUTO: 9.51 10*3/MM3 (ref 1.5–8.3)
NEUTROPHILS NFR BLD AUTO: 76.1 % (ref 41–71)
PHOSPHATE SERPL-MCNC: 3.5 MG/DL (ref 2.4–5.1)
PLATELET # BLD AUTO: 461 10*3/MM3 (ref 150–450)
POTASSIUM SERPL-SCNC: 5.1 MMOL/L (ref 3.5–5.5)
PREALB SERPL-MCNC: 14 MG/DL (ref 10–36)
PROT SERPL-MCNC: 6.8 G/DL (ref 5.7–8.2)
PTH-INTACT SERPL-MCNC: 20 PG/ML (ref 15–65)
RBC # BLD AUTO: 5.28 10*6/MM3 (ref 3.89–5.14)
SODIUM SERPL-SCNC: 138 MMOL/L (ref 132–146)
VIT A SERPL-MCNC: 37 UG/DL (ref 26–82)
VIT B1 BLD-SCNC: 195 NMOL/L (ref 66.5–200)
WBC # BLD AUTO: 12.5 10*3/MM3 (ref 3.5–10.8)
ZINC SERPL-MCNC: 101 UG/DL (ref 56–134)

## 2017-07-05 RX ORDER — OMEPRAZOLE 20 MG/1
40 CAPSULE, DELAYED RELEASE ORAL 2 TIMES DAILY
Qty: 60 CAPSULE | Refills: 11 | Status: SHIPPED | OUTPATIENT
Start: 2017-07-05 | End: 2017-07-10

## 2017-07-10 ENCOUNTER — TELEPHONE (OUTPATIENT)
Dept: BARIATRICS/WEIGHT MGMT | Facility: CLINIC | Age: 67
End: 2017-07-10

## 2017-07-10 RX ORDER — OMEPRAZOLE 20 MG/1
20 CAPSULE, DELAYED RELEASE ORAL DAILY
Qty: 90 CAPSULE | Refills: 0 | Status: SHIPPED | OUTPATIENT
Start: 2017-07-10 | End: 2017-07-10

## 2017-07-10 RX ORDER — OMEPRAZOLE 40 MG/1
40 CAPSULE, DELAYED RELEASE ORAL 2 TIMES DAILY
Qty: 60 CAPSULE | Refills: 1 | Status: SHIPPED | OUTPATIENT
Start: 2017-07-10 | End: 2017-09-08

## 2017-07-10 NOTE — TELEPHONE ENCOUNTER
Patient called in stating that her insurance wouldn't cover omeprazole 20mg but that it would cover 40mg, she is wanting to know if it could be changed. Patient is also requesting more than one refill. Thanks.

## 2017-07-10 NOTE — TELEPHONE ENCOUNTER
CLARIFICATION     Omeprazole 20mg resent in error called pharmacy to cancel RX as well as RX for 40 mg Bid which was sent in error.    Patient is to be on omeprazole 40mg po qd #90 with no refills called in to pharmacy.

## 2017-09-07 ENCOUNTER — OFFICE VISIT (OUTPATIENT)
Dept: BARIATRICS/WEIGHT MGMT | Facility: CLINIC | Age: 67
End: 2017-09-07

## 2017-09-07 VITALS
SYSTOLIC BLOOD PRESSURE: 142 MMHG | BODY MASS INDEX: 34.71 KG/M2 | RESPIRATION RATE: 18 BRPM | HEIGHT: 68 IN | WEIGHT: 229 LBS | DIASTOLIC BLOOD PRESSURE: 80 MMHG | TEMPERATURE: 97.8 F | OXYGEN SATURATION: 99 % | HEART RATE: 82 BPM

## 2017-09-07 DIAGNOSIS — E55.9 HYPOVITAMINOSIS D: ICD-10-CM

## 2017-09-07 DIAGNOSIS — R53.83 FATIGUE, UNSPECIFIED TYPE: Primary | ICD-10-CM

## 2017-09-07 DIAGNOSIS — E66.9 OBESITY, CLASS I, BMI 30-34.9: ICD-10-CM

## 2017-09-07 DIAGNOSIS — Z98.84 S/P BARIATRIC SURGERY: ICD-10-CM

## 2017-09-07 DIAGNOSIS — Z13.0 SCREENING, ANEMIA, DEFICIENCY, IRON: ICD-10-CM

## 2017-09-07 DIAGNOSIS — E46 PROTEIN-CALORIE MALNUTRITION (HCC): ICD-10-CM

## 2017-09-07 DIAGNOSIS — E78.5 HYPERLIPIDEMIA, UNSPECIFIED HYPERLIPIDEMIA TYPE: ICD-10-CM

## 2017-09-07 DIAGNOSIS — Z13.21 MALNUTRITION SCREEN: ICD-10-CM

## 2017-09-07 PROCEDURE — 99214 OFFICE O/P EST MOD 30 MIN: CPT | Performed by: SURGERY

## 2017-09-07 NOTE — PROGRESS NOTES
"[facility] Bariatric Surgery  [address]  [facility] [id number] [phone]        Patient Name:  Nuris Ribeiro.  :  1950      Date of Visit: 2017      Reason for Visit:   3 months postop      HPI: Nuris Ribeiro is a 66 y.o. female s/p LSG by GDW on 17    Since she was last here, she was dx with Charcot foot by her podiatrist.  She is in a boot, and is awaiting a custom boot to stabilize it.  She attributes this to diabetic neuropathy.  She was previously more active, parking far from door, etc. But now must limit exercise.      Doing well.  No issues/concerns. Denies dysphagia, reflux, nausea, vomiting and abdominal pain.  Getting 70+g prot/day (including protein drink and protein bar).  Drinking 50+ fluid oz/day.  1 month labs revealed low prealbumin at 14.  Not much hair loss.  Taking MVI, B12, B1, Calcium, Vit D, iron and Vit C.  On Omeprazole and Actigall bid.       Presurgery weight: 290 pounds.  Today's weight is 229 lb (104 kg) pounds, today's  Body mass index is 34.82 kg/(m^2)., and her weight loss since surgery is 61 pounds.      Past Medical History:   Diagnosis Date   • Abnormal CXR     worsening chronic changes, see report   • CKD (chronic kidney disease), stage III     BUN/Cr .40 gfr 38  followed by nephrology   • Coronary artery disease     s/p stenting , on ASA/Plavix, followed by Dr. Buck   • Diabetes mellitus     Dx 30+ yrs ago, thinks insulin last 15+ years.  \"I have not been a good diabetic\" - says didn't check her sugars for years.  A1c >9   • Diastasis of rectus abdominis     massive   • Dyslipidemia    • Dyspepsia     rare. no QUEENIE sx's or RUQ pains.  serum h. pyl neg   • Dyspnea on exertion    • Fatigue    • Gangrene     (R) 5th toe amputated   • Gout     recently started on allopurinol but doesn't know why   • History of DVT (deep vein thrombosis)     years ago, etiology unclear, tx w/ Warfarin   • Hypertension    • " Hypothyroidism    • Leukocytosis     13.98, asx   • Morbid obesity    • Peripheral edema    • PVD (peripheral vascular disease)     w/ cellulitis of RLE 1/2017 tx w/ Vanc/Rocephin   • Venous stasis dermatitis     R>L, with phelbitis s/p Rocephin/Flagyl 1/17   • Ventral hernia     periumbilical assoc w large rectus diastasis, chronic incarc   • Vitamin D deficiency    • Wears glasses      Past Surgical History:   Procedure Laterality Date   • COLONOSCOPY  2015   • CORONARY ANGIOPLASTY WITH STENT PLACEMENT  2008   • GASTRIC SLEEVE LAPAROSCOPIC N/A 6/2/2017    Procedure: GASTRIC SLEEVE LAPAROSCOPIC;  Surgeon: Tiago Obregon MD;  Location: [facility];  Service:    • TOE AMPUTATION Right 2015    5th toe, d/t gangrene   • TONSILLECTOMY  1967     Outpatient Prescriptions Marked as Taking for the 9/7/17 encounter (Office Visit) with Sherrill Rangel MD   Medication Sig Dispense Refill   • allopurinol (ZYLOPRIM) 100 MG tablet 100 mg Daily.     • amLODIPine (NORVASC) 5 MG tablet      • cholecalciferol (VITAMIN D3) 1000 UNITS tablet Take 1,000 Units by mouth Daily.     • insulin detemir (LEVEMIR) 100 UNIT/ML injection Inject 30 Units under the skin 2 (Two) Times a Day.     • insulin lispro (humaLOG) 100 UNIT/ML injection Inject  under the skin 3 (Three) Times a Day Before Meals.     • levothyroxine (SYNTHROID, LEVOTHROID) 112 MCG tablet 112 mcg. Takes two daily     • Multiple Vitamin (MULTI VITAMIN PO) Take 1 tablet by mouth Daily.     • omeprazole (PRILOSEC) 40 MG capsule Take 1 capsule by mouth 2 (Two) Times a Day for 60 days. 60 capsule 1   • simvastatin (ZOCOR) 40 MG tablet Take 40 mg by mouth.         Allergies   Allergen Reactions   • Amoxicillin Rash     can tolerate Rocephin       Social History     Social History   • Marital status:      Spouse name: N/A   • Number of children: N/A   • Years of education: N/A     Occupational History   • Not on file.     Social History Main Topics   • Smoking status:  "Never Smoker   • Smokeless tobacco: Never Used   • Alcohol use No   • Drug use: No   • Sexual activity: Defer     Other Topics Concern   • Not on file     Social History Narrative    Patient is  with 2 adult children and lives in [place]. 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        /80 (BP Location: Left arm, Patient Position: Sitting, Cuff Size: Large Adult)  Pulse 82  Temp 97.8 °F (36.6 °C) (Temporal Artery )   Resp 18  Ht 68\" (172.7 cm)  Wt 229 lb (104 kg)  SpO2 99%  BMI 34.82 kg/m2    Physical Exam   Constitutional: She is oriented to person, place, and time. She appears well-developed and well-nourished. No distress.   HENT:   Head: Normocephalic and atraumatic.   Mouth/Throat: No oropharyngeal exudate.   Eyes: EOM are normal. Pupils are equal, round, and reactive to light. No scleral icterus.   Pulmonary/Chest: Effort normal. No respiratory distress.   Abdominal: Soft. She exhibits no distension and no mass. There is no tenderness. A hernia is present.   Incisions well-healed, small nontender umbilical hernia   Musculoskeletal:   Left food in boot   Neurological: She is alert and oriented to person, place, and time. No cranial nerve deficit.   Skin: Skin is warm and dry. No erythema.   Psychiatric: She has a normal mood and affect. Her behavior is normal. Judgment and thought content normal.         Assessment:  3 months s/p LSG by GDW on 6/2/17    ICD-10-CM ICD-9-CM   1. Fatigue, unspecified type R53.83 780.79   2. Obesity, Class I, BMI 30-34.9 E66.9 278.00   3. Hyperlipidemia, unspecified hyperlipidemia type E78.5 272.4   4. S/P bariatric surgery Z98.84 V45.86   5. Screening, anemia, deficiency, iron Z13.0 V78.0   6. Malnutrition screen Z13.21 V77.2   7. Hypovitaminosis D E55.9 268.9   8. Protein-calorie malnutrition  E46 263.9         Plan:  Doing well. Continue w/ good food choices and healthy habits.  Continue protein >70g/day. "  Continue routine exercise as recommended by her podiatrist (currently in boot for Charcot food).  Routine bariatric labs ordered.  Continue vitamins w/ adjustments pending lab results.  Call w/ problems/concerns.     The patient was instructed to follow up in 3 months, sooner if needed.    note: approx 15 of the 25 minute visit was spent counseling on nutrition and necessary dietary/lifestyle modifications.    Sherrill Rangel MD

## 2017-09-11 LAB
25(OH)D3+25(OH)D2 SERPL-MCNC: 64.4 NG/ML (ref 30–100)
ALBUMIN SERPL-MCNC: 3.9 G/DL (ref 3.6–4.8)
ALBUMIN/GLOB SERPL: 1.3 {RATIO} (ref 1.2–2.2)
ALP SERPL-CCNC: 91 IU/L (ref 39–117)
ALT SERPL-CCNC: 12 IU/L (ref 0–32)
AST SERPL-CCNC: 14 IU/L (ref 0–40)
BASOPHILS # BLD AUTO: 0 X10E3/UL (ref 0–0.2)
BASOPHILS NFR BLD AUTO: 0 %
BILIRUB SERPL-MCNC: 0.6 MG/DL (ref 0–1.2)
BUN SERPL-MCNC: 24 MG/DL (ref 8–27)
BUN/CREAT SERPL: 24 (ref 12–28)
CALCIUM SERPL-MCNC: 9.8 MG/DL (ref 8.7–10.3)
CHLORIDE SERPL-SCNC: 103 MMOL/L (ref 96–106)
CO2 SERPL-SCNC: 23 MMOL/L (ref 18–29)
CREAT SERPL-MCNC: 1.01 MG/DL (ref 0.57–1)
EOSINOPHIL # BLD AUTO: 0.2 X10E3/UL (ref 0–0.4)
EOSINOPHIL NFR BLD AUTO: 1 %
ERYTHROCYTE [DISTWIDTH] IN BLOOD BY AUTOMATED COUNT: 14.8 % (ref 12.3–15.4)
FERRITIN SERPL-MCNC: 109 NG/ML (ref 15–150)
FOLATE SERPL-MCNC: 17 NG/ML
GLOBULIN SER CALC-MCNC: 3.1 G/DL (ref 1.5–4.5)
GLUCOSE SERPL-MCNC: 140 MG/DL (ref 65–99)
HCT VFR BLD AUTO: 48.3 % (ref 34–46.6)
HGB BLD-MCNC: 16.6 G/DL (ref 11.1–15.9)
IMM GRANULOCYTES # BLD: 0 X10E3/UL (ref 0–0.1)
IMM GRANULOCYTES NFR BLD: 0 %
IRON SERPL-MCNC: 91 UG/DL (ref 27–139)
LYMPHOCYTES # BLD AUTO: 2.4 X10E3/UL (ref 0.7–3.1)
LYMPHOCYTES NFR BLD AUTO: 16 %
MCH RBC QN AUTO: 30.2 PG (ref 26.6–33)
MCHC RBC AUTO-ENTMCNC: 34.4 G/DL (ref 31.5–35.7)
MCV RBC AUTO: 88 FL (ref 79–97)
METHYLMALONATE SERPL-SCNC: 240 NMOL/L (ref 0–378)
MONOCYTES # BLD AUTO: 0.7 X10E3/UL (ref 0.1–0.9)
MONOCYTES NFR BLD AUTO: 5 %
NEUTROPHILS # BLD AUTO: 11.2 X10E3/UL (ref 1.4–7)
NEUTROPHILS NFR BLD AUTO: 78 %
PLATELET # BLD AUTO: 427 X10E3/UL (ref 150–379)
POTASSIUM SERPL-SCNC: 4.8 MMOL/L (ref 3.5–5.2)
PREALB SERPL-MCNC: 16 MG/DL (ref 10–36)
PROT SERPL-MCNC: 7 G/DL (ref 6–8.5)
RBC # BLD AUTO: 5.5 X10E6/UL (ref 3.77–5.28)
SODIUM SERPL-SCNC: 142 MMOL/L (ref 134–144)
VIT B1 BLD-SCNC: 228.8 NMOL/L (ref 66.5–200)
WBC # BLD AUTO: 14.5 X10E3/UL (ref 3.4–10.8)

## 2017-09-12 ENCOUNTER — APPOINTMENT (OUTPATIENT)
Dept: CT IMAGING | Facility: HOSPITAL | Age: 67
End: 2017-09-12

## 2017-09-12 ENCOUNTER — APPOINTMENT (OUTPATIENT)
Dept: CARDIOLOGY | Facility: HOSPITAL | Age: 67
End: 2017-09-12
Attending: EMERGENCY MEDICINE

## 2017-09-12 ENCOUNTER — HOSPITAL ENCOUNTER (OUTPATIENT)
Facility: HOSPITAL | Age: 67
Setting detail: OBSERVATION
Discharge: HOME | End: 2017-09-13
Attending: EMERGENCY MEDICINE | Admitting: INTERNAL MEDICINE

## 2017-09-12 DIAGNOSIS — R94.31 PROLONGED Q-T INTERVAL ON ECG: ICD-10-CM

## 2017-09-12 DIAGNOSIS — R55 SYNCOPE, UNSPECIFIED SYNCOPE TYPE: Primary | ICD-10-CM

## 2017-09-12 DIAGNOSIS — D75.1 POLYCYTHEMIA: ICD-10-CM

## 2017-09-12 DIAGNOSIS — R79.89 ELEVATED D-DIMER: ICD-10-CM

## 2017-09-12 DIAGNOSIS — D72.829 LEUKOCYTOSIS, UNSPECIFIED TYPE: ICD-10-CM

## 2017-09-12 PROBLEM — E86.9 VOLUME DEPLETION: Status: ACTIVE | Noted: 2017-09-12

## 2017-09-12 PROBLEM — L03.115 CELLULITIS OF RIGHT LOWER EXTREMITY WITHOUT FOOT: Status: RESOLVED | Noted: 2017-01-08 | Resolved: 2017-09-12

## 2017-09-12 LAB
ALBUMIN SERPL-MCNC: 3.6 G/DL (ref 3.2–4.8)
ALBUMIN/GLOB SERPL: 1.1 G/DL (ref 1.5–2.5)
ALP SERPL-CCNC: 96 U/L (ref 25–100)
ALT SERPL W P-5'-P-CCNC: 18 U/L (ref 7–40)
ANION GAP SERPL CALCULATED.3IONS-SCNC: 4 MMOL/L (ref 3–11)
AST SERPL-CCNC: 17 U/L (ref 0–33)
BASOPHILS # BLD AUTO: 0.02 10*3/MM3 (ref 0–0.2)
BASOPHILS NFR BLD AUTO: 0.1 % (ref 0–1)
BH CV LOWER VASCULAR LEFT COMMON FEMORAL AUGMENT: NORMAL
BH CV LOWER VASCULAR LEFT COMMON FEMORAL COMPRESS: NORMAL
BH CV LOWER VASCULAR LEFT COMMON FEMORAL PHASIC: NORMAL
BH CV LOWER VASCULAR LEFT COMMON FEMORAL SPONT: NORMAL
BH CV LOWER VASCULAR LEFT DISTAL FEMORAL COMPRESS: NORMAL
BH CV LOWER VASCULAR LEFT GASTRONEMIUS COMPRESS: NORMAL
BH CV LOWER VASCULAR LEFT GREATER SAPH AK COMPRESS: NORMAL
BH CV LOWER VASCULAR LEFT GREATER SAPH BK COMPRESS: NORMAL
BH CV LOWER VASCULAR LEFT LESSER SAPH COMPRESS: NORMAL
BH CV LOWER VASCULAR LEFT MID FEMORAL AUGMENT: NORMAL
BH CV LOWER VASCULAR LEFT MID FEMORAL COMPRESS: NORMAL
BH CV LOWER VASCULAR LEFT MID FEMORAL PHASIC: NORMAL
BH CV LOWER VASCULAR LEFT MID FEMORAL SPONT: NORMAL
BH CV LOWER VASCULAR LEFT PERONEAL COMPRESS: NORMAL
BH CV LOWER VASCULAR LEFT POPLITEAL AUGMENT: NORMAL
BH CV LOWER VASCULAR LEFT POPLITEAL COMPRESS: NORMAL
BH CV LOWER VASCULAR LEFT POPLITEAL PHASIC: NORMAL
BH CV LOWER VASCULAR LEFT POPLITEAL SPONT: NORMAL
BH CV LOWER VASCULAR LEFT POSTERIOR TIBIAL COMPRESS: NORMAL
BH CV LOWER VASCULAR LEFT PROXIMAL FEMORAL COMPRESS: NORMAL
BH CV LOWER VASCULAR LEFT SAPHENOFEMORAL JUNCTION AUGMENT: NORMAL
BH CV LOWER VASCULAR LEFT SAPHENOFEMORAL JUNCTION COMPRESS: NORMAL
BH CV LOWER VASCULAR LEFT SAPHENOFEMORAL JUNCTION PHASIC: NORMAL
BH CV LOWER VASCULAR LEFT SAPHENOFEMORAL JUNCTION SPONT: NORMAL
BH CV LOWER VASCULAR RIGHT COMMON FEMORAL AUGMENT: NORMAL
BH CV LOWER VASCULAR RIGHT COMMON FEMORAL COMPRESS: NORMAL
BH CV LOWER VASCULAR RIGHT COMMON FEMORAL PHASIC: NORMAL
BH CV LOWER VASCULAR RIGHT COMMON FEMORAL SPONT: NORMAL
BH CV LOWER VASCULAR RIGHT DISTAL FEMORAL COMPRESS: NORMAL
BH CV LOWER VASCULAR RIGHT GASTRONEMIUS COMPRESS: NORMAL
BH CV LOWER VASCULAR RIGHT GREATER SAPH AK COMPRESS: NORMAL
BH CV LOWER VASCULAR RIGHT GREATER SAPH BK COMPRESS: NORMAL
BH CV LOWER VASCULAR RIGHT LESSER SAPH COMPRESS: NORMAL
BH CV LOWER VASCULAR RIGHT MID FEMORAL AUGMENT: NORMAL
BH CV LOWER VASCULAR RIGHT MID FEMORAL COMPRESS: NORMAL
BH CV LOWER VASCULAR RIGHT MID FEMORAL PHASIC: NORMAL
BH CV LOWER VASCULAR RIGHT MID FEMORAL SPONT: NORMAL
BH CV LOWER VASCULAR RIGHT PERONEAL COMPRESS: NORMAL
BH CV LOWER VASCULAR RIGHT POPLITEAL AUGMENT: NORMAL
BH CV LOWER VASCULAR RIGHT POPLITEAL COMPRESS: NORMAL
BH CV LOWER VASCULAR RIGHT POPLITEAL PHASIC: NORMAL
BH CV LOWER VASCULAR RIGHT POPLITEAL SPONT: NORMAL
BH CV LOWER VASCULAR RIGHT POSTERIOR TIBIAL COMPRESS: NORMAL
BH CV LOWER VASCULAR RIGHT PROXIMAL FEMORAL COMPRESS: NORMAL
BH CV LOWER VASCULAR RIGHT SAPHENOFEMORAL JUNCTION AUGMENT: NORMAL
BH CV LOWER VASCULAR RIGHT SAPHENOFEMORAL JUNCTION COMPRESS: NORMAL
BH CV LOWER VASCULAR RIGHT SAPHENOFEMORAL JUNCTION PHASIC: NORMAL
BH CV LOWER VASCULAR RIGHT SAPHENOFEMORAL JUNCTION SPONT: NORMAL
BILIRUB SERPL-MCNC: 0.6 MG/DL (ref 0.3–1.2)
BILIRUB UR QL STRIP: NEGATIVE
BUN BLD-MCNC: 27 MG/DL (ref 9–23)
BUN/CREAT SERPL: 27 (ref 7–25)
CALCIUM SPEC-SCNC: 9.6 MG/DL (ref 8.7–10.4)
CHLORIDE SERPL-SCNC: 108 MMOL/L (ref 99–109)
CLARITY UR: CLEAR
CO2 SERPL-SCNC: 27 MMOL/L (ref 20–31)
COLOR UR: YELLOW
CREAT BLD-MCNC: 1 MG/DL (ref 0.6–1.3)
D DIMER PPP FEU-MCNC: 3.5 MG/L (FEU) (ref 0–0.5)
DEPRECATED RDW RBC AUTO: 44.2 FL (ref 37–54)
EOSINOPHIL # BLD AUTO: 0.16 10*3/MM3 (ref 0–0.3)
EOSINOPHIL NFR BLD AUTO: 1.2 % (ref 0–3)
ERYTHROCYTE [DISTWIDTH] IN BLOOD BY AUTOMATED COUNT: 13.5 % (ref 11.3–14.5)
GFR SERPL CREATININE-BSD FRML MDRD: 56 ML/MIN/1.73
GLOBULIN UR ELPH-MCNC: 3.4 GM/DL
GLUCOSE BLD-MCNC: 175 MG/DL (ref 70–100)
GLUCOSE BLDC GLUCOMTR-MCNC: 152 MG/DL (ref 70–130)
GLUCOSE UR STRIP-MCNC: NEGATIVE MG/DL
HCT VFR BLD AUTO: 49.7 % (ref 34.5–44)
HGB BLD-MCNC: 16.6 G/DL (ref 11.5–15.5)
HGB UR QL STRIP.AUTO: NEGATIVE
HOLD SPECIMEN: NORMAL
HOLD SPECIMEN: NORMAL
IMM GRANULOCYTES # BLD: 0.02 10*3/MM3 (ref 0–0.03)
IMM GRANULOCYTES NFR BLD: 0.1 % (ref 0–0.6)
KETONES UR QL STRIP: ABNORMAL
LEUKOCYTE ESTERASE UR QL STRIP.AUTO: NEGATIVE
LYMPHOCYTES # BLD AUTO: 1.63 10*3/MM3 (ref 0.6–4.8)
LYMPHOCYTES NFR BLD AUTO: 11.9 % (ref 24–44)
MAGNESIUM SERPL-MCNC: 1.7 MG/DL (ref 1.3–2.7)
MCH RBC QN AUTO: 29.5 PG (ref 27–31)
MCHC RBC AUTO-ENTMCNC: 33.4 G/DL (ref 32–36)
MCV RBC AUTO: 88.3 FL (ref 80–99)
MONOCYTES # BLD AUTO: 0.53 10*3/MM3 (ref 0–1)
MONOCYTES NFR BLD AUTO: 3.9 % (ref 0–12)
NEUTROPHILS # BLD AUTO: 11.33 10*3/MM3 (ref 1.5–8.3)
NEUTROPHILS NFR BLD AUTO: 82.8 % (ref 41–71)
NITRITE UR QL STRIP: NEGATIVE
PH UR STRIP.AUTO: 5.5 [PH] (ref 5–8)
PLATELET # BLD AUTO: 387 10*3/MM3 (ref 150–450)
PMV BLD AUTO: 10 FL (ref 6–12)
POTASSIUM BLD-SCNC: 3.9 MMOL/L (ref 3.5–5.5)
PROT SERPL-MCNC: 7 G/DL (ref 5.7–8.2)
PROT UR QL STRIP: NEGATIVE
RBC # BLD AUTO: 5.63 10*6/MM3 (ref 3.89–5.14)
SODIUM BLD-SCNC: 139 MMOL/L (ref 132–146)
SP GR UR STRIP: 1.04 (ref 1–1.03)
TROPONIN I SERPL-MCNC: 0 NG/ML (ref 0–0.07)
TROPONIN I SERPL-MCNC: 0.01 NG/ML (ref 0–0.07)
UROBILINOGEN UR QL STRIP: ABNORMAL
WBC NRBC COR # BLD: 13.69 10*3/MM3 (ref 3.5–10.8)
WHOLE BLOOD HOLD SPECIMEN: NORMAL
WHOLE BLOOD HOLD SPECIMEN: NORMAL

## 2017-09-12 PROCEDURE — 96361 HYDRATE IV INFUSION ADD-ON: CPT

## 2017-09-12 PROCEDURE — 85379 FIBRIN DEGRADATION QUANT: CPT | Performed by: EMERGENCY MEDICINE

## 2017-09-12 PROCEDURE — G0378 HOSPITAL OBSERVATION PER HR: HCPCS

## 2017-09-12 PROCEDURE — 25010000002 MAGNESIUM SULFATE IN D5W 1G/100ML (PREMIX) 1-5 GM/100ML-% SOLUTION: Performed by: EMERGENCY MEDICINE

## 2017-09-12 PROCEDURE — 80053 COMPREHEN METABOLIC PANEL: CPT | Performed by: EMERGENCY MEDICINE

## 2017-09-12 PROCEDURE — 83735 ASSAY OF MAGNESIUM: CPT | Performed by: EMERGENCY MEDICINE

## 2017-09-12 PROCEDURE — 99285 EMERGENCY DEPT VISIT HI MDM: CPT

## 2017-09-12 PROCEDURE — 71275 CT ANGIOGRAPHY CHEST: CPT

## 2017-09-12 PROCEDURE — 81003 URINALYSIS AUTO W/O SCOPE: CPT | Performed by: PHYSICIAN ASSISTANT

## 2017-09-12 PROCEDURE — 93970 EXTREMITY STUDY: CPT

## 2017-09-12 PROCEDURE — 82962 GLUCOSE BLOOD TEST: CPT

## 2017-09-12 PROCEDURE — 0 IOPAMIDOL PER 1 ML: Performed by: EMERGENCY MEDICINE

## 2017-09-12 PROCEDURE — 93005 ELECTROCARDIOGRAM TRACING: CPT | Performed by: EMERGENCY MEDICINE

## 2017-09-12 PROCEDURE — 63710000001 INSULIN DETEMIR PER 5 UNITS: Performed by: PHYSICIAN ASSISTANT

## 2017-09-12 PROCEDURE — 96365 THER/PROPH/DIAG IV INF INIT: CPT

## 2017-09-12 PROCEDURE — 63710000001 INSULIN LISPRO (HUMAN) PER 5 UNITS: Performed by: PHYSICIAN ASSISTANT

## 2017-09-12 PROCEDURE — 99220 PR INITIAL OBSERVATION CARE/DAY 70 MINUTES: CPT | Performed by: PHYSICIAN ASSISTANT

## 2017-09-12 PROCEDURE — 85025 COMPLETE CBC W/AUTO DIFF WBC: CPT | Performed by: EMERGENCY MEDICINE

## 2017-09-12 PROCEDURE — 93970 EXTREMITY STUDY: CPT | Performed by: INTERNAL MEDICINE

## 2017-09-12 PROCEDURE — 84484 ASSAY OF TROPONIN QUANT: CPT

## 2017-09-12 RX ORDER — ACETAMINOPHEN 325 MG/1
650 TABLET ORAL EVERY 4 HOURS PRN
Status: DISCONTINUED | OUTPATIENT
Start: 2017-09-12 | End: 2017-09-13 | Stop reason: HOSPADM

## 2017-09-12 RX ORDER — SODIUM CHLORIDE 9 MG/ML
100 INJECTION, SOLUTION INTRAVENOUS CONTINUOUS
Status: DISCONTINUED | OUTPATIENT
Start: 2017-09-12 | End: 2017-09-12

## 2017-09-12 RX ORDER — SODIUM CHLORIDE 0.9 % (FLUSH) 0.9 %
10 SYRINGE (ML) INJECTION AS NEEDED
Status: DISCONTINUED | OUTPATIENT
Start: 2017-09-12 | End: 2017-09-13 | Stop reason: HOSPADM

## 2017-09-12 RX ORDER — MAGNESIUM SULFATE 1 G/100ML
1 INJECTION INTRAVENOUS ONCE
Status: COMPLETED | OUTPATIENT
Start: 2017-09-12 | End: 2017-09-12

## 2017-09-12 RX ORDER — ALLOPURINOL 100 MG/1
100 TABLET ORAL DAILY
Status: DISCONTINUED | OUTPATIENT
Start: 2017-09-12 | End: 2017-09-13 | Stop reason: HOSPADM

## 2017-09-12 RX ORDER — DEXTROSE MONOHYDRATE 25 G/50ML
25 INJECTION, SOLUTION INTRAVENOUS
Status: DISCONTINUED | OUTPATIENT
Start: 2017-09-12 | End: 2017-09-13 | Stop reason: HOSPADM

## 2017-09-12 RX ORDER — CLOPIDOGREL BISULFATE 75 MG/1
75 TABLET ORAL DAILY
COMMUNITY
End: 2017-09-20 | Stop reason: HOSPADM

## 2017-09-12 RX ORDER — DIPHENOXYLATE HYDROCHLORIDE AND ATROPINE SULFATE 2.5; .025 MG/1; MG/1
1 TABLET ORAL DAILY
Status: DISCONTINUED | OUTPATIENT
Start: 2017-09-13 | End: 2017-09-13 | Stop reason: HOSPADM

## 2017-09-12 RX ORDER — LISINOPRIL 10 MG/1
10 TABLET ORAL DAILY
Status: DISCONTINUED | OUTPATIENT
Start: 2017-09-13 | End: 2017-09-13 | Stop reason: HOSPADM

## 2017-09-12 RX ORDER — OMEPRAZOLE 40 MG/1
40 CAPSULE, DELAYED RELEASE ORAL DAILY
COMMUNITY
End: 2017-09-27

## 2017-09-12 RX ORDER — PROMETHAZINE HYDROCHLORIDE 12.5 MG/1
12.5 TABLET ORAL EVERY 6 HOURS PRN
Status: DISCONTINUED | OUTPATIENT
Start: 2017-09-12 | End: 2017-09-13 | Stop reason: HOSPADM

## 2017-09-12 RX ORDER — PANTOPRAZOLE SODIUM 40 MG/1
40 TABLET, DELAYED RELEASE ORAL EVERY MORNING
Status: DISCONTINUED | OUTPATIENT
Start: 2017-09-13 | End: 2017-09-13 | Stop reason: HOSPADM

## 2017-09-12 RX ORDER — SODIUM CHLORIDE 0.9 % (FLUSH) 0.9 %
1-10 SYRINGE (ML) INJECTION AS NEEDED
Status: DISCONTINUED | OUTPATIENT
Start: 2017-09-12 | End: 2017-09-13 | Stop reason: HOSPADM

## 2017-09-12 RX ORDER — NICOTINE POLACRILEX 4 MG
15 LOZENGE BUCCAL
Status: DISCONTINUED | OUTPATIENT
Start: 2017-09-12 | End: 2017-09-13 | Stop reason: HOSPADM

## 2017-09-12 RX ORDER — LEVOTHYROXINE SODIUM 112 UG/1
224 TABLET ORAL
Status: DISCONTINUED | OUTPATIENT
Start: 2017-09-13 | End: 2017-09-13 | Stop reason: HOSPADM

## 2017-09-12 RX ORDER — LISINOPRIL 20 MG/1
20 TABLET ORAL DAILY
COMMUNITY
End: 2017-09-13 | Stop reason: HOSPADM

## 2017-09-12 RX ORDER — CLOPIDOGREL BISULFATE 75 MG/1
75 TABLET ORAL DAILY
Status: DISCONTINUED | OUTPATIENT
Start: 2017-09-12 | End: 2017-09-13 | Stop reason: HOSPADM

## 2017-09-12 RX ORDER — SODIUM CHLORIDE 9 MG/ML
100 INJECTION, SOLUTION INTRAVENOUS CONTINUOUS
Status: DISCONTINUED | OUTPATIENT
Start: 2017-09-12 | End: 2017-09-13

## 2017-09-12 RX ADMIN — INSULIN LISPRO 4 UNITS: 100 INJECTION, SOLUTION INTRAVENOUS; SUBCUTANEOUS at 18:43

## 2017-09-12 RX ADMIN — MAGNESIUM SULFATE HEPTAHYDRATE 1 G: 1 INJECTION, SOLUTION INTRAVENOUS at 13:29

## 2017-09-12 RX ADMIN — SODIUM CHLORIDE 1000 ML: 9 INJECTION, SOLUTION INTRAVENOUS at 13:22

## 2017-09-12 RX ADMIN — IOPAMIDOL 100 ML: 755 INJECTION, SOLUTION INTRAVENOUS at 15:03

## 2017-09-12 RX ADMIN — SODIUM CHLORIDE 100 ML/HR: 9 INJECTION, SOLUTION INTRAVENOUS at 18:44

## 2017-09-12 RX ADMIN — SODIUM CHLORIDE 125 ML/HR: 9 INJECTION, SOLUTION INTRAVENOUS at 13:22

## 2017-09-12 RX ADMIN — SODIUM CHLORIDE 100 ML/HR: 9 INJECTION, SOLUTION INTRAVENOUS at 23:43

## 2017-09-12 RX ADMIN — INSULIN DETEMIR 12 UNITS: 100 INJECTION, SOLUTION SUBCUTANEOUS at 21:18

## 2017-09-12 NOTE — H&P
[facility] Medicine Services  HISTORY AND PHYSICAL    Patient Name: Nuris Ribeiro  : 1950  MRN: [id number]  Primary Care Physician: Pa Milan DO    Subjective     Chief Complaint: syncope    HPI:  Ms. Nuris Ribeiro is a 67yo female with a history of insulin-dependent DMII, HTN, hyperlipidemia, CKD III, PVD and CAD s/p stent ( with Dr. Buck). She is s/p gastric sleeve by Dr. Danielito Obregon in 2017. She is doing well and has lost approximately 60lbs. She's been eating well but notes she isn't drinking as much water as she has been told to.     She presented to [facility] ED on 17 for evaluation of syncopal episodes. First episode around 1 week ago. She reported her blood glucose was 78 at dinner, she ate and gave herself 25 units of Levemir and 6 of Lispro. She got up to use the restroom and on the way back, collapsed suddenly. She did not strike her head. Unsure of how long she was out. Second syncopal episode was this morning.  reports she was in her normal state of health. 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 She passed out at her desk for approximately 20 seconds before coming to. After the episode, she had to have a bowel movement and vomited three times, each time, a small amount. She reports that she had an 11oz protein shake this morning. No other food or water intake prior to the episode.     She denies recent fever, chills, sick contacts, dyspnea, cough, dysuria, urinary frequency or urgency. No orthostatic symptoms upon rising from a seated or supine position.     Upon arrival to the ED, she was hypotensive with /69. BP responded to IV fluids. Labs in the ED were favorable. Creatinine is 1.00 and BUN 27. Magnesium low at 1.7. D-dimer elevated at 3.5. BLE duplex was negative. CTA of the chest negative for PE. ECG did show prolonged QTc of 475 (17 ECG with QTc 412).  "    Hospital medicine will admit for further evaluation.     Review of Systems   Constitutional: Positive for activity change. Negative for diaphoresis, fatigue and fever.        Walking less after put in walking boot for Charcot foot   HENT: Negative.    Respiratory: Negative for cough, shortness of breath and wheezing.    Cardiovascular: Negative for chest pain and palpitations.   Gastrointestinal: Positive for nausea and vomiting. Negative for abdominal pain, constipation and diarrhea.   Endocrine: Negative.    Genitourinary: Negative for dysuria, frequency and urgency.   Musculoskeletal: Negative.    Skin: Negative.    Neurological: Positive for syncope.   Psychiatric/Behavioral: Negative.       Otherwise complete 10-system ROS is negative except as mentioned in the HPI.    Personal History     Past Medical History:   Diagnosis Date   • Abnormal CXR     worsening chronic changes, see report   • CKD (chronic kidney disease), stage III     BUN/Cr 21/1.40 gfr 38  followed by nephrology   • Coronary artery disease     s/p stenting 2008, on ASA/Plavix, followed by Dr. Buck   • Diabetes mellitus     Dx 30+ yrs ago, thinks insulin last 15+ years.  \"I have not been a good diabetic\" - says didn't check her sugars for years.  A1c >9   • Diastasis of rectus abdominis     massive   • Dyslipidemia    • Dyspepsia     rare. no QUEENIE sx's or RUQ pains.  serum h. pyl neg   • Dyspnea on exertion    • Fatigue    • Gangrene     (R) 5th toe amputated   • Gout     recently started on allopurinol but doesn't know why   • History of DVT (deep vein thrombosis)     years ago, etiology unclear, tx w/ Warfarin   • Hypertension    • Hypothyroidism    • Leukocytosis     13.98, asx   • Morbid obesity    • Peripheral edema    • PVD (peripheral vascular disease)     w/ cellulitis of RLE 1/2017 tx w/ Vanc/Rocephin   • Venous stasis dermatitis     R>L, with phelbitis s/p Rocephin/Flagyl 1/17   • Ventral hernia     periumbilical assoc w large " rectus diastasis, chronic incarc   • Vitamin D deficiency    • Wears glasses      Past Surgical History:   Procedure Laterality Date   • COLONOSCOPY  2015   • CORONARY ANGIOPLASTY WITH STENT PLACEMENT  2008   • GASTRIC SLEEVE LAPAROSCOPIC N/A 6/2/2017    Procedure: GASTRIC SLEEVE LAPAROSCOPIC;  Surgeon: Tiago Obregon MD;  Location: [facility];  Service:    • TOE AMPUTATION Right 2015    5th toe, d/t gangrene   • TONSILLECTOMY  1967     Family History: family history includes Coronary artery disease in her mother; Diabetes in her sister; Heart disease in her maternal grandfather; Hypertension in her father, mother, and sister; Leukemia in her father; Macular degeneration in her mother; Other in her mother; Stroke in her sister.     Social History:  reports that she has never smoked. She has never used smokeless tobacco. She reports that she does not drink alcohol or use illicit drugs.    Medications:    (Not in a hospital admission)    Allergies   Allergen Reactions   • Amoxicillin Rash     can tolerate Rocephin     Objective     Temp:  [98.2 °F (36.8 °C)] 98.2 °F (36.8 °C)  Heart Rate:  [68-89] 69  Resp:  [14-18] 14  BP: (105-142)/(55-79) 141/72     Physical Exam   Constitutional: No acute distress, awake, alert, conversant.   Eyes: PERRLA, sclerae anicteric, no conjunctival injection  HENT: NCAT, mucous membranes moist  Neck: Supple, no thyromegaly, no lymphadenopathy, trachea midline  Respiratory: Clear to auscultation bilaterally, nonlabored respirations   Cardiovascular: RRR, no murmurs, rubs, or gallops, palpable pedal pulses bilaterally  Gastrointestinal: Positive bowel sounds, soft, nontender, nondistended  Musculoskeletal: No bilateral ankle edema, no clubbing or cyanosis to bilateral lower extremities  Psychiatric: Oriented x 3, appropriate affect, cooperative  Neurologic: Strength symmetric in all extremities, Cranial Nerves grossly intact to confrontation, speech clear  Skin: No rashes to exposed  extremities    Results Reviewed:  I have personally reviewed current lab, radiology, and data and agree.  Lab Results (last 24 hours)     Procedure Component Value Units Date/Time    CBC Auto Differential [[id number]]  (Abnormal) Collected:  09/12/17 1044    Specimen:  Blood Updated:  09/12/17 1054     WBC 13.69 (H) 10*3/mm3      RBC 5.63 (H) 10*6/mm3      Hemoglobin 16.6 (H) g/dL      Hematocrit 49.7 (H) %      MCV 88.3 fL      MCH 29.5 pg      MCHC 33.4 g/dL      RDW 13.5 %      RDW-SD 44.2 fl      MPV 10.0 fL      Platelets 387 10*3/mm3      Neutrophil % 82.8 (H) %      Lymphocyte % 11.9 (L) %      Monocyte % 3.9 %      Eosinophil % 1.2 %      Basophil % 0.1 %      Immature Grans % 0.1 %      Neutrophils, Absolute 11.33 (H) 10*3/mm3      Lymphocytes, Absolute 1.63 10*3/mm3      Monocytes, Absolute 0.53 10*3/mm3      Eosinophils, Absolute 0.16 10*3/mm3      Basophils, Absolute 0.02 10*3/mm3      Immature Grans, Absolute 0.02 10*3/mm3     POC Troponin, Rapid [[id number]]  (Normal) Collected:  09/12/17 1045    Specimen:  Blood Updated:  09/12/17 1105     Troponin I 0.00 ng/mL       Serial Number: [id number]:  [id number]       Comprehensive Metabolic Panel [[id number]]  (Abnormal) Collected:  09/12/17 1044    Specimen:  Blood Updated:  09/12/17 1110     Glucose 175 (H) mg/dL      BUN 27 (H) mg/dL      Creatinine 1.00 mg/dL      Sodium 139 mmol/L      Potassium 3.9 mmol/L      Chloride 108 mmol/L      CO2 27.0 mmol/L      Calcium 9.6 mg/dL      Total Protein 7.0 g/dL      Albumin 3.60 g/dL      ALT (SGPT) 18 U/L      AST (SGOT) 17 U/L      Alkaline Phosphatase 96 U/L      Total Bilirubin 0.6 mg/dL      eGFR Non African Amer 56 (L) mL/min/1.73      Globulin 3.4 gm/dL      A/G Ratio 1.1 (L) g/dL      BUN/Creatinine Ratio 27.0 (H)     Anion Gap 4.0 mmol/L     Magnesium [[id number]]  (Normal) Collected:  09/12/17 1044    Specimen:  Blood Updated:  09/12/17 1110     Magnesium 1.7 mg/dL     D-dimer, Quantitative [[id number]]   (Abnormal) Collected:  09/12/17 1044    Specimen:  Blood Updated:  09/12/17 1236     D-Dimer, Quantitative 3.50 (H) mg/L (FEU)     Narrative:       Negative predictive value for exclusion of venous thromboembolism: < or = 0.5 mg/L (FEU)    POC Troponin, Rapid [[id number]]  (Normal) Collected:  09/12/17 1350    Specimen:  Blood Updated:  09/12/17 1407     Troponin I 0.01 ng/mL       Serial Number: [id number]:  [id number]           Imaging Results (last 24 hours)     Procedure Component Value Units Date/Time    CT Angiogram Chest With Contrast [[id number]] Updated:  09/12/17 1528        Assessment / Plan      Principal Problem:    Syncopal episodes  Active Problems:    Hypertension    Diabetes mellitus    Hypothyroidism    Volume depletion    Prolonged Q-T interval on ECG    Syncopal episodes  - Two episodes in past week. Possible hypoglycemia element.   - Will decrease insulin dosing and monitor  - Replete fluids, hypotensive and a bit dry on labs   - Check echocardiogram, orthostatics and UA    Volume depletion, as above     Prolonged QTc  - Replete magnesium tonight  - AM ECG   - Not currently taking any QTc-prolonging drugs     Hypertension  -stop norvasc  -decrease lisinopril from 20mg daily-->10mg po daily    Insulin-dependent DMII  - As above. Will decrease dosing and monitor.     Hypothyroidism  - Will check TSH     DVT prophylaxis: Lovenox  CODE STATUS: FULL     Admission Status:  I believe this patient meets LEXOBS     Charo Martin PA-C   09/12/17   4:29 PM       Brief Attending Admission Attestation     I have seen and examined the patient, performing an independent face-to-face diagnostic evaluation with plan of care reviewed and developed with the advanced practice clinician (APC).      Brief Summary Statement/HPI:   65 yo f w/ hx IDDM, htn, hl, hypothyroidism. S/p gastric sleeve surgery June 2017 by dr. sams and has lost 62 pounds. Has diabetic neuropathy w/ charcot foot, for which currently in  "boot for this. About a week ago, \"passed out\" very briefly, about a second, after getting up in the night and walking in hallway. Had been standing a minute or so. Immediately prior to episode felt a little light headed but no palpitations, no preceding chest pain, no headache. No incontinence. Was back to normal within a couple seconds. Did not check sugar at that time. No head trauma.  Today went to work. Took all bp meds. Doesn't check her blood pressure though. Had been sitting 20 minutes, felt light headed and \"flushed\" but denies pain. Slowly lowered to ground, no head trauma. Was \"out\" for about 15-20 seconds. Back to normal immediately. Coworkers did not notice any slurred speech, focal weakness. glc 130's per patient. No seizure activity. No antecedent chest pain, or palpitations, Did have to move bowels within 5 minutes afterward and had n/v x 2.   Upon arrival to er, sbp 105. glc ok. Slightly dehydrated \"by labs\". ekg unchanges, troponins negative x 2. No chest pain. D-dimer elevated, negative ct angio and negative BLE duplex.   Currently feels normal. No recent dyspnea w/ exertion, admits to not eating much \"these days\" drinks 3 glasses crystal light daily.      Attending Physical Exam:  Alert, oriented x 4, nontoxic appearing  Ncat, oroph clear, neck supple  rrr  ctab  abd obese, soft, nontender  1+LE edema bilaterally  Lfoot boot in place  Face symmetric, speech clear, equal , equal leg raise, speech clear, pupils equal, reactive, visual fields full by confrontation  No truncal rash        Brief Assessment/Plan/Discussion :  -discussed obtaining and documenting orthostatics now in the ER (already received fluids though); likely syncope related to dehydration +/- vagal episode (n/v afterward), less likely dysrhythmia. Could feasibly have autonomic instability related to diabetic neuropathy. No chest pain or palpitations, quick recovery, no ictal or post ictal phase. ekg sinus, unchanged from prior, " d-dimer up but negative BLE duplex and negative ct angio and no hypoxia. No fever, no chills.    -hydrate w/ normal saline  -follow up urinalysis  -check tsh  -replace e-lytes prn  -echo  -stop norvasc, decrease lisinopril from 20mg daily to10mg daily starting 9/13 a.m. (hold parameters written)  -BMP in a.m.  -encouraged check bp 3x daily at home (hasn't been checking but has a cuff)      *depending on clinical course, results of above, possible d/c home soon    I believe this patient meets observation status    Aayush Camacho MD  09/12/17  4:53 PM

## 2017-09-12 NOTE — ED PROVIDER NOTES
"Subjective   HPI Comments: Nuris Ribeiro is a pleasant 66 y.o.female with a hx of DM and CAD who presents to the ED with c/o syncope. While at work, the pt suddenly felt light-headed. After taking her sugar, she had a syncopal event, and was helped into a chair by her coworkers. She was unresponsive for approximately 20 seconds. After the episode she had one episode of vomiting. She was brought to the ED where she states that she felt normal this morning. The pt denies CP, palpitations, fever, chills, rhinorrhea, sore throat, SoA or any other acute sx at this time.    She states that she had a similar episode last week. At that time her FSBS was 78.      Patient is a 66 y.o. female presenting with syncope.   History provided by:  Patient  Syncope   Most recent episode:  Today  Duration:  20 seconds  Timing:  Constant  Progression:  Resolved  Witnessed: yes    Relieved by:  None tried  Worsened by:  Nothing  Ineffective treatments:  None tried  Associated symptoms: nausea and vomiting    Associated symptoms: no chest pain, no diaphoresis, no fever, no headaches, no palpitations and no shortness of breath        Review of Systems   Constitutional: Negative for chills, diaphoresis and fever.   Respiratory: Negative for shortness of breath.    Cardiovascular: Positive for syncope. Negative for chest pain and palpitations.   Gastrointestinal: Positive for nausea and vomiting. Negative for diarrhea.   Neurological: Positive for syncope. Negative for headaches.   All other systems reviewed and are negative.      Past Medical History:   Diagnosis Date   • Abnormal CXR     worsening chronic changes, see report   • CKD (chronic kidney disease), stage III     BUN/Cr 21/1.40 gfr 38  followed by nephrology   • Coronary artery disease     s/p stenting 2008, on ASA/Plavix, followed by Dr. Buck   • Diabetes mellitus     Dx 30+ yrs ago, thinks insulin last 15+ years.  \"I have not been a good diabetic\" - says didn't check " her sugars for years.  A1c >9   • Diastasis of rectus abdominis     massive   • Dyslipidemia    • Dyspepsia     rare. no QUEENIE sx's or RUQ pains.  serum h. pyl neg   • Dyspnea on exertion    • Fatigue    • Gangrene     (R) 5th toe amputated   • Gout     recently started on allopurinol but doesn't know why   • History of DVT (deep vein thrombosis)     years ago, etiology unclear, tx w/ Warfarin   • Hypertension    • Hypothyroidism    • Leukocytosis     13.98, asx   • Morbid obesity    • Peripheral edema    • PVD (peripheral vascular disease)     w/ cellulitis of RLE 1/2017 tx w/ Vanc/Rocephin   • Venous stasis dermatitis     R>L, with phelbitis s/p Rocephin/Flagyl 1/17   • Ventral hernia     periumbilical assoc w large rectus diastasis, chronic incarc   • Vitamin D deficiency    • Wears glasses      She sees Dr. Obregon and had gastric sleeve placed on June 2, 2017.     She diagnosed with charcot foot by her podiatrist.    Hx of hypokalemia for which she was admitted in June.    CAD with stent by Dr. Buck.    Allergies   Allergen Reactions   • Amoxicillin Rash     can tolerate Rocephin       Past Surgical History:   Procedure Laterality Date   • COLONOSCOPY  2015   • CORONARY ANGIOPLASTY WITH STENT PLACEMENT  2008   • GASTRIC SLEEVE LAPAROSCOPIC N/A 6/2/2017    Procedure: GASTRIC SLEEVE LAPAROSCOPIC;  Surgeon: Tiago Obregon MD;  Location: [facility];  Service:    • TOE AMPUTATION Right 2015    5th toe, d/t gangrene   • TONSILLECTOMY  1967       Family History   Problem Relation Age of Onset   • Hypertension Mother    • Coronary artery disease Mother    • Macular degeneration Mother    • Other Mother      DYSLIPIDEMIA   • Leukemia Father    • Hypertension Father    • Diabetes Sister    • Stroke Sister    • Hypertension Sister    • Heart disease Maternal Grandfather        Social History     Social History   • Marital status:      Spouse name: N/A   • Number of children: N/A   • Years of education: N/A      Social History Main Topics   • Smoking status: Never Smoker   • Smokeless tobacco: Never Used   • Alcohol use No   • Drug use: No   • Sexual activity: Defer     Other Topics Concern   • None     Social History Narrative    Patient is  with 2 adult children and lives in [place]. 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          Objective   Physical Exam   Constitutional: She is oriented to person, place, and time. She appears well-developed and well-nourished. No distress.   HENT:   Head: Normocephalic and atraumatic.   Mouth/Throat: No oropharyngeal exudate.   Eyes: Conjunctivae are normal. No scleral icterus.   Neck: Normal range of motion. Neck supple. No JVD present.   Cardiovascular: Normal rate, regular rhythm and normal heart sounds.  Exam reveals no gallop and no friction rub.    No murmur heard.  Pulmonary/Chest: Effort normal and breath sounds normal. No respiratory distress. She has no wheezes. She has no rales.   Abdominal: Soft. Bowel sounds are normal. She exhibits no distension. There is no tenderness. There is no rebound and no guarding.   Obese with well healed port sites from previous gastric surgery.   Musculoskeletal: Normal range of motion. She exhibits edema.   Left foot in walking boot. 2+ pitting edema in the both legs, which is chronic. No venous cords.   Neurological: She is alert and oriented to person, place, and time.   Face symmetric, voice strong, tongue midline; Vision, hearing and speech all preserved. Mild generalized weakness.   Skin: Skin is warm and dry. She is not diaphoretic.   Psychiatric: She has a normal mood and affect. Her behavior is normal.   Nursing note and vitals reviewed.      Procedures         ED Course  ED Course   Comment By Time   Per the [employer] tech, the venous is negative. Jaciel Clarke 09/12 1259   Dr. Topete re-evaluated the pt and discussed all findings. [facility] 1427     Efrem spoke to the hospitalist, who will admit. Jaciel Clarke 09/12 1448     Recent Results (from the past 24 hour(s))   Urinalysis With / Culture If Indicated    Collection Time: 09/12/17  5:21 PM   Result Value Ref Range    Color, UA Yellow Yellow, Straw    Appearance, UA Clear Clear    pH, UA 5.5 5.0 - 8.0    Specific Gravity, UA 1.036 (H) 1.001 - 1.030    Glucose, UA Negative Negative    Ketones, UA Trace (A) Negative    Bilirubin, UA Negative Negative    Blood, UA Negative Negative    Protein, UA Negative Negative    Leuk Esterase, UA Negative Negative    Nitrite, UA Negative Negative    Urobilinogen, UA 0.2 E.U./dL 0.2 - 1.0 E.U./dL   POC Glucose Fingerstick    Collection Time: 09/12/17  9:29 PM   Result Value Ref Range    Glucose 152 (H) 70 - 130 mg/dL   CBC (No Diff)    Collection Time: 09/13/17  7:02 AM   Result Value Ref Range    WBC 13.16 (H) 3.50 - 10.80 10*3/mm3    RBC 5.21 (H) 3.89 - 5.14 10*6/mm3    Hemoglobin 15.2 11.5 - 15.5 g/dL    Hematocrit 46.1 (H) 34.5 - 44.0 %    MCV 88.5 80.0 - 99.0 fL    MCH 29.2 27.0 - 31.0 pg    MCHC 33.0 32.0 - 36.0 g/dL    RDW 13.7 11.3 - 14.5 %    RDW-SD 44.5 37.0 - 54.0 fl    MPV 10.0 6.0 - 12.0 fL    Platelets 370 150 - 450 10*3/mm3   Basic Metabolic Panel    Collection Time: 09/13/17  7:02 AM   Result Value Ref Range    Glucose 112 (H) 70 - 100 mg/dL    BUN 15 9 - 23 mg/dL    Creatinine 0.80 0.60 - 1.30 mg/dL    Sodium 140 132 - 146 mmol/L    Potassium 3.8 3.5 - 5.5 mmol/L    Chloride 110 (H) 99 - 109 mmol/L    CO2 25.0 20.0 - 31.0 mmol/L    Calcium 9.1 8.7 - 10.4 mg/dL    eGFR Non African Amer 72 >60 mL/min/1.73    BUN/Creatinine Ratio 18.8 7.0 - 25.0    Anion Gap 5.0 3.0 - 11.0 mmol/L   Magnesium    Collection Time: 09/13/17  7:02 AM   Result Value Ref Range    Magnesium 1.8 1.3 - 2.7 mg/dL   Hemoglobin A1c    Collection Time: 09/13/17  7:02 AM   Result Value Ref Range    Hemoglobin A1C 6.20 (H) 4.80 - 5.60 %   TSH    Collection Time: 09/13/17  7:02 AM    Result Value Ref Range    TSH 0.385 0.350 - 5.350 mIU/mL   POC Glucose Fingerstick    Collection Time: 09/13/17  7:43 AM   Result Value Ref Range    Glucose 102 70 - 130 mg/dL   POC Glucose Fingerstick    Collection Time: 09/13/17 11:18 AM   Result Value Ref Range    Glucose 199 (H) 70 - 130 mg/dL     Note: In addition to lab results from this visit, the labs listed above may include labs taken at another facility or during a different encounter within the last 24 hours. Please correlate lab times with ED admission and discharge times for further clarification of the services performed during this visit.    CT Angiogram Chest With Contrast   Final Result   Negative CT angiogram of the chest. There is no evidence of   pulmonary embolus.       D:  09/12/2017   E:  09/12/2017               This report was finalized on 9/12/2017 4:38 PM by Dr. Barry Avery MD.            Vitals:    09/13/17 0000 09/13/17 0400 09/13/17 0714 09/13/17 1227   BP: 147/63 144/60 147/73 142/62   BP Location: Right arm Right arm Right arm Right arm   Patient Position: Lying Lying Lying Sitting   Pulse: 60 52 59 60   Resp: 18 20 20 20   Temp: 98 °F (36.7 °C) 97.9 °F (36.6 °C) 97.7 °F (36.5 °C) 97.7 °F (36.5 °C)   TempSrc: Oral Oral Oral Oral   SpO2: 99% 98% 96% 97%   Weight:       Height:         Medications   sodium chloride 0.9 % flush 10 mL (not administered)   allopurinol (ZYLOPRIM) tablet 100 mg (100 mg Oral Given 9/13/17 0825)   clopidogrel (PLAVIX) tablet 75 mg (75 mg Oral Given 9/13/17 0825)   levothyroxine (SYNTHROID, LEVOTHROID) tablet 224 mcg (224 mcg Oral Given 9/13/17 0525)   multivitamin (THERAGRAN) tablet 1 tablet (1 tablet Oral Given 9/13/17 0825)   pantoprazole (PROTONIX) EC tablet 40 mg (40 mg Oral Given 9/13/17 0627)   lisinopril (PRINIVIL,ZESTRIL) tablet 10 mg (10 mg Oral Given 9/13/17 0825)   dextrose (GLUTOSE) oral gel 15 g (not administered)   dextrose (D50W) solution 25 g (not administered)   glucagon (GLUCAGEN)  injection 1 mg (not administered)   sodium chloride 0.9 % flush 1-10 mL (not administered)   acetaminophen (TYLENOL) tablet 650 mg (not administered)   insulin detemir (LEVEMIR) injection 12 Units (12 Units Subcutaneous Given 9/13/17 0837)   insulin lispro (humaLOG) injection 4 Units (4 Units Subcutaneous Given 9/13/17 1158)   promethazine (PHENERGAN) tablet 12.5 mg (not administered)   magnesium sulfate in D5W 1g/100mL (PREMIX) (0 g Intravenous Stopped 9/12/17 1447)   sodium chloride 0.9 % bolus 1,000 mL (0 mL Intravenous Stopped 9/12/17 1447)   iopamidol (ISOVUE-370) 76 % injection 100 mL (100 mL Intravenous Given 9/12/17 1503)     ECG/EMG Results (last 24 hours)     Procedure Component Value Units Date/Time    ECG 12 Lead [[id number]] Collected:  09/12/17 0951     Updated:  09/12/17 1147    Narrative:       Test Reason : SYNCOPE  Blood Pressure : **/** mmHG  Vent. Rate : 090 BPM     Atrial Rate : 090 BPM     P-R Int : 188 ms          QRS Dur : 092 ms      QT Int : 396 ms       P-R-T Axes : 050 -43 033 degrees     QTc Int : 484 ms    Normal sinus rhythm  Left axis deviation  Inferior infarct (cited on or before 10-DEC-2015)  Anteroseptal infarct (cited on or before 10-DEC-2015)  Abnormal ECG  When compared with ECG of 22-MAY-2017 13:05,  Questionable change in initial forces of Septal leads  QT has lengthened  Confirmed by JUAN F RAMIREZ MD ([id number]) on 9/12/2017 11:46:56 AM    Referred By:  EDMD           Confirmed By:JUAN F RAMIREZ MD                        MDM  Number of Diagnoses or Management Options  Elevated d-dimer:   Leukocytosis, unspecified type:   Polycythemia:   Prolonged Q-T interval on ECG:   Syncope, unspecified syncope type:   Diagnosis management comments:       I have reviewed all available studies at the bedside with the patient and her .  Thankfully her ultrasound of her leg shows no evidence of clot.  However her d-dimer is elevated in the setting of syncope, body habitus, and polycythemia  I thought CTA of the chest was warranted. This was performed, and the pulmonary region shows no evidence of high-grade pulmonary embolus but cannot rule out a second or third order HEBER and am awaiting radiology's formal read.    Her stress test from 2016 per Dr. Buck's office notes showed a normal EF and no evidence of ischemia.  She does have history of previous stent placement.    Her QTC is slightly prolonged here. She is not currently on any medications that would classically be QT prolonged. Her magnesium, though within therapeutic range, is under 2 and I will give her a gram of magnesium here as well.    At this point I think she needs to be admitted for observation and telemetry wraps to assess her LV function.  Her syncope very well may been vasovagal in nature but given her multiple comorbidities I think we need to move cautiously.    I spoke Dr. Wisdom, on-call hospital medicine, he will admit the patient.    All are agreeable with the plan.       Amount and/or Complexity of Data Reviewed  Clinical lab tests: reviewed  Tests in the radiology section of CPT®: reviewed  Tests in the medicine section of CPT®: reviewed        Final diagnoses:   Syncope, unspecified syncope type   Elevated d-dimer   Prolonged Q-T interval on ECG   Polycythemia   Leukocytosis, unspecified type       Documentation assistance provided by misbah Clarke.  Information recorded by the christopheribaileen was done at my direction and has been verified and validated by me.     Jaciel Clarke  09/12/17 1317       Jaciel Clarke  09/12/17 1531       Clayton Topete MD  09/13/17 [id number]

## 2017-09-13 ENCOUNTER — APPOINTMENT (OUTPATIENT)
Dept: CARDIOLOGY | Facility: HOSPITAL | Age: 67
End: 2017-09-13

## 2017-09-13 VITALS
TEMPERATURE: 97.7 F | OXYGEN SATURATION: 97 % | SYSTOLIC BLOOD PRESSURE: 138 MMHG | HEART RATE: 60 BPM | BODY MASS INDEX: 34.71 KG/M2 | HEIGHT: 68 IN | WEIGHT: 229 LBS | RESPIRATION RATE: 20 BRPM | DIASTOLIC BLOOD PRESSURE: 71 MMHG

## 2017-09-13 LAB
ANION GAP SERPL CALCULATED.3IONS-SCNC: 5 MMOL/L (ref 3–11)
BH CV ECHO MEAS - AO ROOT AREA (BSA CORRECTED): 1.3
BH CV ECHO MEAS - AO ROOT AREA: 6.6 CM^2
BH CV ECHO MEAS - AO ROOT DIAM: 2.9 CM
BH CV ECHO MEAS - BSA(HAYCOCK): 2.3 M^2
BH CV ECHO MEAS - BSA: 2.2 M^2
BH CV ECHO MEAS - BZI_BMI: 34.8 KILOGRAMS/M^2
BH CV ECHO MEAS - BZI_METRIC_HEIGHT: 172.7 CM
BH CV ECHO MEAS - BZI_METRIC_WEIGHT: 103.9 KG
BH CV ECHO MEAS - CONTRAST EF (2CH): 81.7 ML/M^2
BH CV ECHO MEAS - CONTRAST EF 4CH: 70.5 ML/M^2
BH CV ECHO MEAS - EDV(CUBED): 91.7 ML
BH CV ECHO MEAS - EDV(MOD-SP2): 109 ML
BH CV ECHO MEAS - EDV(MOD-SP4): 129 ML
BH CV ECHO MEAS - EDV(TEICH): 92.9 ML
BH CV ECHO MEAS - EF(CUBED): 75.3 %
BH CV ECHO MEAS - EF(MOD-SP2): 81.7 %
BH CV ECHO MEAS - EF(MOD-SP4): 70.5 %
BH CV ECHO MEAS - EF(TEICH): 67.4 %
BH CV ECHO MEAS - ESV(CUBED): 22.7 ML
BH CV ECHO MEAS - ESV(MOD-SP2): 20 ML
BH CV ECHO MEAS - ESV(MOD-SP4): 38 ML
BH CV ECHO MEAS - ESV(TEICH): 30.3 ML
BH CV ECHO MEAS - FS: 37.3 %
BH CV ECHO MEAS - IVS/LVPW: 0.99
BH CV ECHO MEAS - IVSD: 0.85 CM
BH CV ECHO MEAS - LA DIMENSION: 3.7 CM
BH CV ECHO MEAS - LA/AO: 1.3
BH CV ECHO MEAS - LAT PEAK E' VEL: 9.9 CM/SEC
BH CV ECHO MEAS - LV DIASTOLIC VOL/BSA (35-75): 59.6 ML/M^2
BH CV ECHO MEAS - LV IVRT: 0.08 SEC
BH CV ECHO MEAS - LV MASS(C)D: 123.6 GRAMS
BH CV ECHO MEAS - LV MASS(C)DI: 57.1 GRAMS/M^2
BH CV ECHO MEAS - LV SYSTOLIC VOL/BSA (12-30): 17.6 ML/M^2
BH CV ECHO MEAS - LVIDD: 4.5 CM
BH CV ECHO MEAS - LVIDS: 2.8 CM
BH CV ECHO MEAS - LVLD AP2: 7.7 CM
BH CV ECHO MEAS - LVLD AP4: 8.8 CM
BH CV ECHO MEAS - LVLS AP2: 5.5 CM
BH CV ECHO MEAS - LVLS AP4: 6.5 CM
BH CV ECHO MEAS - LVOT AREA (M): 2.8 CM^2
BH CV ECHO MEAS - LVOT AREA: 2.8 CM^2
BH CV ECHO MEAS - LVOT DIAM: 1.9 CM
BH CV ECHO MEAS - LVPWD: 0.86 CM
BH CV ECHO MEAS - MED PEAK E' VEL: 6.25 CM/SEC
BH CV ECHO MEAS - MPA AREA: 5.7 CM^2
BH CV ECHO MEAS - MPA DIAM: 2.7 CM
BH CV ECHO MEAS - MV A MAX VEL: 89.8 CM/SEC
BH CV ECHO MEAS - MV DEC TIME: 0.23 SEC
BH CV ECHO MEAS - MV E MAX VEL: 91.8 CM/SEC
BH CV ECHO MEAS - MV E/A: 1
BH CV ECHO MEAS - PA ACC SLOPE: 689.5 CM/SEC^2
BH CV ECHO MEAS - PA ACC TIME: 0.12 SEC
BH CV ECHO MEAS - PA PR(ACCEL): 23.4 MMHG
BH CV ECHO MEAS - PULM A REVS VEL: 32.6 CM/SEC
BH CV ECHO MEAS - PULM DIAS VEL: 38 CM/SEC
BH CV ECHO MEAS - PULM S/D: 1.5
BH CV ECHO MEAS - PULM SYS VEL: 55.3 CM/SEC
BH CV ECHO MEAS - RAP SYSTOLE: 15 MMHG
BH CV ECHO MEAS - RVSP: 39 MMHG
BH CV ECHO MEAS - SI(CUBED): 31.9 ML/M^2
BH CV ECHO MEAS - SI(MOD-SP2): 41.1 ML/M^2
BH CV ECHO MEAS - SI(MOD-SP4): 42 ML/M^2
BH CV ECHO MEAS - SI(TEICH): 28.9 ML/M^2
BH CV ECHO MEAS - SV(CUBED): 69.1 ML
BH CV ECHO MEAS - SV(MOD-SP2): 89 ML
BH CV ECHO MEAS - SV(MOD-SP4): 91 ML
BH CV ECHO MEAS - SV(TEICH): 62.6 ML
BH CV ECHO MEAS - TAPSE (>1.6): 2 CM2
BH CV ECHO MEAS - TR MAX V: 24 MMHG
BH CV ECHO MEAS - TR MAX VEL: 246 CM/SEC
BH CV VAS BP LEFT ARM: NORMAL MMHG
BH CV XLRA - RV BASE: 4.9 CM
BH CV XLRA - RV LENGTH: 7 CM
BH CV XLRA - RV MID: 4.4 CM
BH CV XLRA - TDI S': 15 CM/SEC
BUN BLD-MCNC: 15 MG/DL (ref 9–23)
BUN/CREAT SERPL: 18.8 (ref 7–25)
CALCIUM SPEC-SCNC: 9.1 MG/DL (ref 8.7–10.4)
CHLORIDE SERPL-SCNC: 110 MMOL/L (ref 99–109)
CO2 SERPL-SCNC: 25 MMOL/L (ref 20–31)
CREAT BLD-MCNC: 0.8 MG/DL (ref 0.6–1.3)
DEPRECATED RDW RBC AUTO: 44.5 FL (ref 37–54)
E/E' RATIO: 12
ERYTHROCYTE [DISTWIDTH] IN BLOOD BY AUTOMATED COUNT: 13.7 % (ref 11.3–14.5)
GFR SERPL CREATININE-BSD FRML MDRD: 72 ML/MIN/1.73
GLUCOSE BLD-MCNC: 112 MG/DL (ref 70–100)
GLUCOSE BLDC GLUCOMTR-MCNC: 102 MG/DL (ref 70–130)
GLUCOSE BLDC GLUCOMTR-MCNC: 199 MG/DL (ref 70–130)
HBA1C MFR BLD: 6.2 % (ref 4.8–5.6)
HCT VFR BLD AUTO: 46.1 % (ref 34.5–44)
HGB BLD-MCNC: 15.2 G/DL (ref 11.5–15.5)
LV EF 2D ECHO EST: 70 %
MAGNESIUM SERPL-MCNC: 1.8 MG/DL (ref 1.3–2.7)
MCH RBC QN AUTO: 29.2 PG (ref 27–31)
MCHC RBC AUTO-ENTMCNC: 33 G/DL (ref 32–36)
MCV RBC AUTO: 88.5 FL (ref 80–99)
PLATELET # BLD AUTO: 370 10*3/MM3 (ref 150–450)
PMV BLD AUTO: 10 FL (ref 6–12)
POTASSIUM BLD-SCNC: 3.8 MMOL/L (ref 3.5–5.5)
RBC # BLD AUTO: 5.21 10*6/MM3 (ref 3.89–5.14)
SODIUM BLD-SCNC: 140 MMOL/L (ref 132–146)
TSH SERPL DL<=0.05 MIU/L-ACNC: 0.39 MIU/ML (ref 0.35–5.35)
WBC NRBC COR # BLD: 13.16 10*3/MM3 (ref 3.5–10.8)

## 2017-09-13 PROCEDURE — 85027 COMPLETE CBC AUTOMATED: CPT | Performed by: PHYSICIAN ASSISTANT

## 2017-09-13 PROCEDURE — 99217 PR OBSERVATION CARE DISCHARGE MANAGEMENT: CPT | Performed by: INTERNAL MEDICINE

## 2017-09-13 PROCEDURE — 96361 HYDRATE IV INFUSION ADD-ON: CPT

## 2017-09-13 PROCEDURE — G0378 HOSPITAL OBSERVATION PER HR: HCPCS

## 2017-09-13 PROCEDURE — 93005 ELECTROCARDIOGRAM TRACING: CPT | Performed by: PHYSICIAN ASSISTANT

## 2017-09-13 PROCEDURE — 80048 BASIC METABOLIC PNL TOTAL CA: CPT | Performed by: PHYSICIAN ASSISTANT

## 2017-09-13 PROCEDURE — 84443 ASSAY THYROID STIM HORMONE: CPT | Performed by: PHYSICIAN ASSISTANT

## 2017-09-13 PROCEDURE — 82962 GLUCOSE BLOOD TEST: CPT

## 2017-09-13 PROCEDURE — 63710000001 INSULIN DETEMIR PER 5 UNITS: Performed by: PHYSICIAN ASSISTANT

## 2017-09-13 PROCEDURE — 25010000002 SULFUR HEXAFLUORIDE MICROSPH 60.7-25 MG RECONSTITUTED SUSPENSION: Performed by: PHYSICIAN ASSISTANT

## 2017-09-13 PROCEDURE — C8929 TTE W OR WO FOL WCON,DOPPLER: HCPCS

## 2017-09-13 PROCEDURE — 83735 ASSAY OF MAGNESIUM: CPT | Performed by: PHYSICIAN ASSISTANT

## 2017-09-13 PROCEDURE — 83036 HEMOGLOBIN GLYCOSYLATED A1C: CPT | Performed by: PHYSICIAN ASSISTANT

## 2017-09-13 PROCEDURE — 93306 TTE W/DOPPLER COMPLETE: CPT | Performed by: INTERNAL MEDICINE

## 2017-09-13 PROCEDURE — 93010 ELECTROCARDIOGRAM REPORT: CPT | Performed by: INTERNAL MEDICINE

## 2017-09-13 RX ORDER — LISINOPRIL 10 MG/1
10 TABLET ORAL DAILY
Qty: 30 TABLET | Refills: 2 | Status: SHIPPED | OUTPATIENT
Start: 2017-09-13 | End: 2017-12-13

## 2017-09-13 RX ADMIN — Medication 1 TABLET: at 08:25

## 2017-09-13 RX ADMIN — INSULIN DETEMIR 12 UNITS: 100 INJECTION, SOLUTION SUBCUTANEOUS at 08:37

## 2017-09-13 RX ADMIN — ALLOPURINOL 100 MG: 100 TABLET ORAL at 08:25

## 2017-09-13 RX ADMIN — INSULIN LISPRO 4 UNITS: 100 INJECTION, SOLUTION INTRAVENOUS; SUBCUTANEOUS at 08:25

## 2017-09-13 RX ADMIN — PANTOPRAZOLE SODIUM 40 MG: 40 TABLET, DELAYED RELEASE ORAL at 06:27

## 2017-09-13 RX ADMIN — LEVOTHYROXINE SODIUM 224 MCG: 112 TABLET ORAL at 05:25

## 2017-09-13 RX ADMIN — CLOPIDOGREL BISULFATE 75 MG: 75 TABLET ORAL at 08:25

## 2017-09-13 RX ADMIN — SODIUM CHLORIDE 100 ML/HR: 9 INJECTION, SOLUTION INTRAVENOUS at 08:25

## 2017-09-13 RX ADMIN — INSULIN LISPRO 4 UNITS: 100 INJECTION, SOLUTION INTRAVENOUS; SUBCUTANEOUS at 17:17

## 2017-09-13 RX ADMIN — LISINOPRIL 10 MG: 10 TABLET ORAL at 08:25

## 2017-09-13 RX ADMIN — INSULIN LISPRO 4 UNITS: 100 INJECTION, SOLUTION INTRAVENOUS; SUBCUTANEOUS at 11:58

## 2017-09-13 RX ADMIN — SULFUR HEXAFLUORIDE 2 ML: KIT at 17:00

## 2017-09-13 NOTE — PLAN OF CARE
Problem: Patient Care Overview (Adult)  Goal: Plan of Care Review  Outcome: Ongoing (interventions implemented as appropriate)  Goal: Adult Individualization and Mutuality  Outcome: Ongoing (interventions implemented as appropriate)  Goal: Discharge Needs Assessment  Outcome: Ongoing (interventions implemented as appropriate)    Problem: Fall Risk (Adult)  Goal: Identify Related Risk Factors and Signs and Symptoms  Outcome: Ongoing (interventions implemented as appropriate)  Goal: Absence of Falls  Outcome: Ongoing (interventions implemented as appropriate)    Problem: Cardiac Rhythm Management Device (Adult)  Goal: Signs and Symptoms of Listed Potential Problems Will be Absent or Manageable (Cardiac Rhythm Management Device)  Outcome: Ongoing (interventions implemented as appropriate)

## 2017-09-13 NOTE — DISCHARGE SUMMARY
"    [facility] Medicine Services  DISCHARGE SUMMARY       Date of Admission: 9/12/2017  Date of Discharge:  9/13/2017  Primary Care Physician: Pa Milan DO  Consulting Physician(s)          None           Discharge Diagnoses:  Active Hospital Problems (** Indicates Principal Problem)    Diagnosis Date Noted   • **Syncopal episodes [R55] 09/12/2017   • Volume depletion [E86.9] 09/12/2017   • Prolonged Q-T interval on ECG [R94.31] 09/12/2017   • Syncope [R55] 09/12/2017   • Diabetes mellitus [E11.9]      dx \"many yrs ago\", on insulin >10 yrs, A1c 8.4     • Hypertension [I10]    • Hypothyroidism [E03.9]       Resolved Hospital Problems    Diagnosis Date Noted Date Resolved   No resolved problems to display.       Presenting Problem/History of Present Illness  Syncope, unspecified syncope type [R55]  Syncope [R55]     Chief Complaint on Day of Discharge: syncope    History of Present Illness on Day of Discharge:   No problems overnight.  She has had no further episodes of dizziness, presyncope, or syncope.  Hasn't related to the bathroom without difficulty.  She currently feels to baseline.    Hospital Course  Patient is a 66 y.o. female with a history of type 2 diabetes, hypertension, hyperlipidemia, obesity status post gastric sleeve in June of this year.  Since then she has lost approximately 60 pounds.  She has been titrating down her insulin as she has lost weight and sugars have improved.  She presents to the emergency room following a syncopal episode while at work the day of presentation.  She has similar episode about a week and a half prior her she stood up was walking the hallways and passed out.  Yesterday she was at work when she began to feel poorly.  She apparently passed out for approximately 20 seconds.  By report there is no seizure activity and her blood sugar was around 1:30 with a okay blood pressure.  Afterwards she was a little dazed and then went to the bathroom " "and had a bowel movement but did vomit 3 times and she was sent to the emergency room for further evaluation.  Workup there was fairly unremarkable with a normal CT angiogram of her chest, EKG.  QTc was slightly prolonged at 475.  Initial blood pressure was 105/69.  She received IV fluids overnight and her Norvasc was stopped and her lisinopril dose cut in half.  This morning she is currently back to baseline with no syncopal or presyncopal episodes.  Echocardiogram is been ordered but not yet done.  I suspect that syncope is possibly related to orthostasis and overtreatment of hypertension in the setting of recent gastric sleeve surgery.  Recommend her stopping her Norvasc at this time and decreasing her lisinopril to 10 mg daily.  Echocardiogram will be obtained and results to be followed up as an outpatient.  No murmurs on exam.  She will continue to titrate her insulin per her endocrinologist but I don't feel that these episodes are related to hypoglycemia.  Care was discussed with the patient and she is agreeable to this plan.    Procedures Performed         Consults:   Consults     No orders found for last 30 day(s).          Pertinent Test Results:  CT angiogram of her chest showed no evidence of PE  Lower extremity duplex was unremarkable    Hemoglobin A1c was 6.2%, TSH 0.385    Condition on Discharge:  good    Physical Exam on Discharge:/62 (BP Location: Right arm, Patient Position: Sitting)  Pulse 60  Temp 97.7 °F (36.5 °C) (Oral)   Resp 20  Ht 68\" (172.7 cm)  Wt 229 lb (104 kg)  SpO2 97%  BMI 34.82 kg/m2  Physical Exam   Constitutional: She is oriented to person, place, and time. She appears well-developed and well-nourished. No distress.   HENT:   Head: Normocephalic and atraumatic.   Eyes: EOM are normal. Pupils are equal, round, and reactive to light.   Cardiovascular: Normal rate, regular rhythm and normal heart sounds.    No murmur heard.  Pulmonary/Chest: Effort normal and breath sounds " normal. No respiratory distress.   Abdominal: Soft. Bowel sounds are normal. She exhibits no distension.   Musculoskeletal: She exhibits no edema.   S/p 5th toe amputation   Neurological: She is alert and oriented to person, place, and time.   Skin: Skin is warm and dry. No rash noted.   Psychiatric: She has a normal mood and affect.         Discharge Disposition  Home or Self Care    Discharge Medications   Nuris Ribeiro   Home Medication Instructions TRISTIN:[id number]    Printed on:09/13/17 4375   Medication Information                      allopurinol (ZYLOPRIM) 100 MG tablet  Take 100 mg by mouth Daily.             cholecalciferol (VITAMIN D3) 1000 UNITS tablet  Take 1,000 Units by mouth Daily.             clopidogrel (PLAVIX) 75 MG tablet  Take 75 mg by mouth Daily.             insulin detemir (LEVEMIR) 100 UNIT/ML injection  Inject 25 Units under the skin 2 (Two) Times a Day.             insulin lispro (humaLOG) 100 UNIT/ML injection  Inject 6 Units under the skin 3 (Three) Times a Day Before Meals.             levothyroxine (SYNTHROID, LEVOTHROID) 112 MCG tablet  Take 224 mcg by mouth Daily. Takes two daily             lisinopril (PRINIVIL,ZESTRIL) 10 MG tablet  Take 1 tablet by mouth Daily.             Multiple Vitamin (MULTI VITAMIN PO)  Take 1 tablet by mouth Daily.             omeprazole (priLOSEC) 40 MG capsule  Take 40 mg by mouth Daily.             simvastatin (ZOCOR) 40 MG tablet  Take 40 mg by mouth Every Night.                 Discharge Diet: As tolerated, Diabetic    Discharge Care Plan / Instructions:    Activity at Discharge:   Activity Instructions     Activity as Tolerated                     Follow-up Appointments  Future Appointments  Date Time Provider Department [place]   12/13/2017 9:00 AM JM Pham [facility] None     Additional Instructions for the Follow-ups that You Need to Schedule     Discharge Follow-up with PCP    As directed    Follow Up Details:  PCP 1 week                  Test Results Pending at Discharge       Boby Sanchez MD 09/13/17 1:25 PM    Time: Discharge 35 min    Please note that portions of this note may have been completed with a voice recognition program. Efforts were made to edit the dictations, but occasionally words are mistranscribed.

## 2017-09-13 NOTE — PLAN OF CARE
Problem: Patient Care Overview (Adult)  Goal: Plan of Care Review  Outcome: Ongoing (interventions implemented as appropriate)    09/13/17 0527   Patient Care Overview   Progress progress toward functional goals as expected   Coping/Psychosocial Response Interventions   Plan Of Care Reviewed With patient         Problem: Fall Risk (Adult)  Goal: Identify Related Risk Factors and Signs and Symptoms  Outcome: Ongoing (interventions implemented as appropriate)    09/13/17 0527   Fall Risk   Fall Risk: Related Risk Factors history of falls;environment unfamiliar   Fall Risk: Signs and Symptoms presence of risk factors

## 2017-09-19 ENCOUNTER — APPOINTMENT (OUTPATIENT)
Dept: CT IMAGING | Facility: HOSPITAL | Age: 67
End: 2017-09-19

## 2017-09-19 ENCOUNTER — HOSPITAL ENCOUNTER (INPATIENT)
Facility: HOSPITAL | Age: 67
LOS: 1 days | Discharge: HOME HEALTH | End: 2017-09-20
Attending: EMERGENCY MEDICINE | Admitting: FAMILY MEDICINE

## 2017-09-19 ENCOUNTER — APPOINTMENT (OUTPATIENT)
Dept: MRI IMAGING | Facility: HOSPITAL | Age: 67
End: 2017-09-19
Attending: NEUROLOGICAL SURGERY

## 2017-09-19 ENCOUNTER — APPOINTMENT (OUTPATIENT)
Dept: NEUROLOGY | Facility: HOSPITAL | Age: 67
End: 2017-09-19
Attending: NEUROLOGICAL SURGERY

## 2017-09-19 ENCOUNTER — APPOINTMENT (OUTPATIENT)
Dept: GENERAL RADIOLOGY | Facility: HOSPITAL | Age: 67
End: 2017-09-19

## 2017-09-19 DIAGNOSIS — Z74.09 IMPAIRED MOBILITY AND ADLS: ICD-10-CM

## 2017-09-19 DIAGNOSIS — I62.00 SUBDURAL HEMORRHAGE (HCC): ICD-10-CM

## 2017-09-19 DIAGNOSIS — R79.1 INR (INTERNATIONAL NORMAL RATIO) ABNORMAL: ICD-10-CM

## 2017-09-19 DIAGNOSIS — R47.1 DYSARTHRIA: Primary | ICD-10-CM

## 2017-09-19 DIAGNOSIS — Z78.9 IMPAIRED MOBILITY AND ADLS: ICD-10-CM

## 2017-09-19 DIAGNOSIS — Z74.09 IMPAIRED FUNCTIONAL MOBILITY, BALANCE, GAIT, AND ENDURANCE: ICD-10-CM

## 2017-09-19 PROBLEM — S06.5XAA SDH (SUBDURAL HEMATOMA) (HCC): Status: ACTIVE | Noted: 2017-09-19

## 2017-09-19 PROBLEM — G40.319: Status: ACTIVE | Noted: 2017-09-19

## 2017-09-19 PROBLEM — R47.89 SPELL OF CHANGE IN SPEECH: Status: ACTIVE | Noted: 2017-09-19

## 2017-09-19 LAB
ALBUMIN SERPL-MCNC: 3.9 G/DL (ref 3.2–4.8)
ALBUMIN/GLOB SERPL: 1.1 G/DL (ref 1.5–2.5)
ALP SERPL-CCNC: 101 U/L (ref 25–100)
ALT SERPL W P-5'-P-CCNC: 14 U/L (ref 7–40)
ANION GAP SERPL CALCULATED.3IONS-SCNC: 6 MMOL/L (ref 3–11)
AST SERPL-CCNC: 16 U/L (ref 0–33)
BASOPHILS # BLD AUTO: 0.04 10*3/MM3 (ref 0–0.2)
BASOPHILS NFR BLD AUTO: 0.3 % (ref 0–1)
BILIRUB SERPL-MCNC: 0.5 MG/DL (ref 0.3–1.2)
BILIRUB UR QL STRIP: NEGATIVE
BUN BLD-MCNC: 24 MG/DL (ref 9–23)
BUN BLDA-MCNC: 36 MG/DL (ref 8–26)
BUN/CREAT SERPL: 24 (ref 7–25)
CA-I BLDA-SCNC: 1.21 MMOL/L (ref 1.2–1.32)
CALCIUM SPEC-SCNC: 10.1 MG/DL (ref 8.7–10.4)
CHLORIDE BLDA-SCNC: 105 MMOL/L (ref 98–109)
CHLORIDE SERPL-SCNC: 109 MMOL/L (ref 99–109)
CLARITY UR: CLEAR
CO2 BLDA-SCNC: 25 MMOL/L (ref 24–29)
CO2 SERPL-SCNC: 25 MMOL/L (ref 20–31)
COLOR UR: YELLOW
CREAT BLD-MCNC: 1 MG/DL (ref 0.6–1.3)
CREAT BLDA-MCNC: 1.1 MG/DL (ref 0.6–1.3)
DEPRECATED RDW RBC AUTO: 45.4 FL (ref 37–54)
EOSINOPHIL # BLD AUTO: 0.33 10*3/MM3 (ref 0–0.3)
EOSINOPHIL NFR BLD AUTO: 2.1 % (ref 0–3)
ERYTHROCYTE [DISTWIDTH] IN BLOOD BY AUTOMATED COUNT: 13.9 % (ref 11.3–14.5)
GFR SERPL CREATININE-BSD FRML MDRD: 56 ML/MIN/1.73
GLOBULIN UR ELPH-MCNC: 3.4 GM/DL
GLUCOSE BLD-MCNC: 158 MG/DL (ref 70–100)
GLUCOSE BLDC GLUCOMTR-MCNC: 111 MG/DL (ref 70–130)
GLUCOSE BLDC GLUCOMTR-MCNC: 146 MG/DL (ref 70–130)
GLUCOSE BLDC GLUCOMTR-MCNC: 150 MG/DL (ref 70–130)
GLUCOSE BLDC GLUCOMTR-MCNC: 66 MG/DL (ref 70–130)
GLUCOSE UR STRIP-MCNC: NEGATIVE MG/DL
HCT VFR BLD AUTO: 48.5 % (ref 34.5–44)
HCT VFR BLDA CALC: 51 % (ref 38–51)
HGB BLD-MCNC: 16.6 G/DL (ref 11.5–15.5)
HGB BLDA-MCNC: 17.3 G/DL (ref 12–17)
HGB UR QL STRIP.AUTO: NEGATIVE
HOLD SPECIMEN: NORMAL
HOLD SPECIMEN: NORMAL
IMM GRANULOCYTES # BLD: 0.05 10*3/MM3 (ref 0–0.03)
IMM GRANULOCYTES NFR BLD: 0.3 % (ref 0–0.6)
INR PPP: 1.6 (ref 0.8–1.2)
KETONES UR QL STRIP: NEGATIVE
LEUKOCYTE ESTERASE UR QL STRIP.AUTO: NEGATIVE
LYMPHOCYTES # BLD AUTO: 1.77 10*3/MM3 (ref 0.6–4.8)
LYMPHOCYTES NFR BLD AUTO: 11.2 % (ref 24–44)
MCH RBC QN AUTO: 30.6 PG (ref 27–31)
MCHC RBC AUTO-ENTMCNC: 34.2 G/DL (ref 32–36)
MCV RBC AUTO: 89.5 FL (ref 80–99)
MONOCYTES # BLD AUTO: 0.66 10*3/MM3 (ref 0–1)
MONOCYTES NFR BLD AUTO: 4.2 % (ref 0–12)
NEUTROPHILS # BLD AUTO: 12.89 10*3/MM3 (ref 1.5–8.3)
NEUTROPHILS NFR BLD AUTO: 81.9 % (ref 41–71)
NITRITE UR QL STRIP: NEGATIVE
PH UR STRIP.AUTO: 6 [PH] (ref 5–8)
PLATELET # BLD AUTO: 413 10*3/MM3 (ref 150–450)
PMV BLD AUTO: 10.3 FL (ref 6–12)
POTASSIUM BLD-SCNC: 4.2 MMOL/L (ref 3.5–5.5)
POTASSIUM BLDA-SCNC: 4.2 MMOL/L (ref 3.5–4.9)
PROT SERPL-MCNC: 7.3 G/DL (ref 5.7–8.2)
PROT UR QL STRIP: NEGATIVE
PROTHROMBIN TIME: 19.3 SECONDS (ref 12.8–15.2)
RBC # BLD AUTO: 5.42 10*6/MM3 (ref 3.89–5.14)
SODIUM BLD-SCNC: 140 MMOL/L (ref 132–146)
SODIUM BLDA-SCNC: 140 MMOL/L (ref 138–146)
SP GR UR STRIP: 1.01 (ref 1–1.03)
TROPONIN I SERPL-MCNC: 0 NG/ML (ref 0–0.07)
UROBILINOGEN UR QL STRIP: NORMAL
WBC NRBC COR # BLD: 15.74 10*3/MM3 (ref 3.5–10.8)
WHOLE BLOOD HOLD SPECIMEN: NORMAL
WHOLE BLOOD HOLD SPECIMEN: NORMAL

## 2017-09-19 PROCEDURE — 25010000003 LEVETIRACETAM IN NACL 0.75% 1000 MG/100ML SOLUTION: Performed by: INTERNAL MEDICINE

## 2017-09-19 PROCEDURE — 84484 ASSAY OF TROPONIN QUANT: CPT

## 2017-09-19 PROCEDURE — 85014 HEMATOCRIT: CPT

## 2017-09-19 PROCEDURE — 92523 SPEECH SOUND LANG COMPREHEN: CPT

## 2017-09-19 PROCEDURE — 85025 COMPLETE CBC W/AUTO DIFF WBC: CPT | Performed by: EMERGENCY MEDICINE

## 2017-09-19 PROCEDURE — 85610 PROTHROMBIN TIME: CPT

## 2017-09-19 PROCEDURE — 80053 COMPREHEN METABOLIC PANEL: CPT | Performed by: INTERNAL MEDICINE

## 2017-09-19 PROCEDURE — 95816 EEG AWAKE AND DROWSY: CPT

## 2017-09-19 PROCEDURE — 70553 MRI BRAIN STEM W/O & W/DYE: CPT

## 2017-09-19 PROCEDURE — 70450 CT HEAD/BRAIN W/O DYE: CPT

## 2017-09-19 PROCEDURE — 93005 ELECTROCARDIOGRAM TRACING: CPT | Performed by: EMERGENCY MEDICINE

## 2017-09-19 PROCEDURE — 99285 EMERGENCY DEPT VISIT HI MDM: CPT

## 2017-09-19 PROCEDURE — 71010 HC CHEST PA OR AP: CPT

## 2017-09-19 PROCEDURE — 80047 BASIC METABLC PNL IONIZED CA: CPT

## 2017-09-19 PROCEDURE — A9577 INJ MULTIHANCE: HCPCS | Performed by: INTERNAL MEDICINE

## 2017-09-19 PROCEDURE — 25510000001 GADOBENATE DIMEGLUMINE 529 MG/ML SOLUTION: Performed by: INTERNAL MEDICINE

## 2017-09-19 PROCEDURE — 82565 ASSAY OF CREATININE: CPT

## 2017-09-19 PROCEDURE — 82962 GLUCOSE BLOOD TEST: CPT

## 2017-09-19 PROCEDURE — 99222 1ST HOSP IP/OBS MODERATE 55: CPT | Performed by: NEUROLOGICAL SURGERY

## 2017-09-19 PROCEDURE — 81003 URINALYSIS AUTO W/O SCOPE: CPT | Performed by: INTERNAL MEDICINE

## 2017-09-19 PROCEDURE — 99223 1ST HOSP IP/OBS HIGH 75: CPT | Performed by: INTERNAL MEDICINE

## 2017-09-19 RX ORDER — LISINOPRIL 10 MG/1
10 TABLET ORAL DAILY
Status: DISCONTINUED | OUTPATIENT
Start: 2017-09-20 | End: 2017-09-20 | Stop reason: HOSPADM

## 2017-09-19 RX ORDER — LEVETIRACETAM 10 MG/ML
1000 INJECTION INTRAVASCULAR ONCE
Status: COMPLETED | OUTPATIENT
Start: 2017-09-19 | End: 2017-09-19

## 2017-09-19 RX ORDER — ONDANSETRON 2 MG/ML
4 INJECTION INTRAMUSCULAR; INTRAVENOUS EVERY 6 HOURS PRN
Status: DISCONTINUED | OUTPATIENT
Start: 2017-09-19 | End: 2017-09-20 | Stop reason: HOSPADM

## 2017-09-19 RX ORDER — DIPHENOXYLATE HYDROCHLORIDE AND ATROPINE SULFATE 2.5; .025 MG/1; MG/1
1 TABLET ORAL DAILY
Status: DISCONTINUED | OUTPATIENT
Start: 2017-09-20 | End: 2017-09-20 | Stop reason: HOSPADM

## 2017-09-19 RX ORDER — ENALAPRILAT 2.5 MG/2ML
1.25 INJECTION INTRAVENOUS EVERY 6 HOURS PRN
Status: DISCONTINUED | OUTPATIENT
Start: 2017-09-19 | End: 2017-09-20 | Stop reason: HOSPADM

## 2017-09-19 RX ORDER — PANTOPRAZOLE SODIUM 40 MG/1
40 TABLET, DELAYED RELEASE ORAL EVERY MORNING
Status: DISCONTINUED | OUTPATIENT
Start: 2017-09-20 | End: 2017-09-20 | Stop reason: HOSPADM

## 2017-09-19 RX ORDER — DEXTROSE MONOHYDRATE 25 G/50ML
25 INJECTION, SOLUTION INTRAVENOUS
Status: DISCONTINUED | OUTPATIENT
Start: 2017-09-19 | End: 2017-09-20 | Stop reason: HOSPADM

## 2017-09-19 RX ORDER — LEVOTHYROXINE SODIUM 112 UG/1
224 TABLET ORAL DAILY
Status: DISCONTINUED | OUTPATIENT
Start: 2017-09-20 | End: 2017-09-20 | Stop reason: HOSPADM

## 2017-09-19 RX ORDER — LEVETIRACETAM 500 MG/1
500 TABLET ORAL EVERY 12 HOURS SCHEDULED
Status: DISCONTINUED | OUTPATIENT
Start: 2017-09-19 | End: 2017-09-20 | Stop reason: HOSPADM

## 2017-09-19 RX ORDER — SODIUM CHLORIDE 0.9 % (FLUSH) 0.9 %
10 SYRINGE (ML) INJECTION AS NEEDED
Status: DISCONTINUED | OUTPATIENT
Start: 2017-09-19 | End: 2017-09-20 | Stop reason: HOSPADM

## 2017-09-19 RX ORDER — NICOTINE POLACRILEX 4 MG
15 LOZENGE BUCCAL
Status: DISCONTINUED | OUTPATIENT
Start: 2017-09-19 | End: 2017-09-20 | Stop reason: HOSPADM

## 2017-09-19 RX ORDER — ATORVASTATIN CALCIUM 20 MG/1
20 TABLET, FILM COATED ORAL DAILY
Status: DISCONTINUED | OUTPATIENT
Start: 2017-09-19 | End: 2017-09-20 | Stop reason: HOSPADM

## 2017-09-19 RX ORDER — ALLOPURINOL 100 MG/1
100 TABLET ORAL DAILY
Status: DISCONTINUED | OUTPATIENT
Start: 2017-09-20 | End: 2017-09-20 | Stop reason: HOSPADM

## 2017-09-19 RX ORDER — MELATONIN
1000 DAILY
Status: DISCONTINUED | OUTPATIENT
Start: 2017-09-20 | End: 2017-09-20 | Stop reason: HOSPADM

## 2017-09-19 RX ORDER — SODIUM CHLORIDE 0.9 % (FLUSH) 0.9 %
1-10 SYRINGE (ML) INJECTION AS NEEDED
Status: DISCONTINUED | OUTPATIENT
Start: 2017-09-19 | End: 2017-09-20 | Stop reason: HOSPADM

## 2017-09-19 RX ADMIN — LEVETIRACETAM 500 MG: 500 TABLET, FILM COATED ORAL at 21:19

## 2017-09-19 RX ADMIN — LEVETIRACETAM 1000 MG: 10 INJECTION INTRAVENOUS at 16:01

## 2017-09-19 RX ADMIN — ATORVASTATIN CALCIUM 20 MG: 20 TABLET, FILM COATED ORAL at 21:19

## 2017-09-19 RX ADMIN — GADOBENATE DIMEGLUMINE 20 ML: 529 INJECTION, SOLUTION INTRAVENOUS at 13:00

## 2017-09-20 VITALS
WEIGHT: 225 LBS | TEMPERATURE: 96.8 F | BODY MASS INDEX: 34.1 KG/M2 | SYSTOLIC BLOOD PRESSURE: 112 MMHG | OXYGEN SATURATION: 96 % | HEIGHT: 68 IN | HEART RATE: 65 BPM | DIASTOLIC BLOOD PRESSURE: 59 MMHG | RESPIRATION RATE: 17 BRPM

## 2017-09-20 PROBLEM — R47.89 SPELL OF CHANGE IN SPEECH: Status: RESOLVED | Noted: 2017-09-19 | Resolved: 2017-09-20

## 2017-09-20 LAB
ANION GAP SERPL CALCULATED.3IONS-SCNC: 6 MMOL/L (ref 3–11)
ARTICHOKE IGE QN: 77 MG/DL (ref 0–130)
BUN BLD-MCNC: 16 MG/DL (ref 9–23)
BUN/CREAT SERPL: 17.8 (ref 7–25)
CALCIUM SPEC-SCNC: 9.5 MG/DL (ref 8.7–10.4)
CHLORIDE SERPL-SCNC: 107 MMOL/L (ref 99–109)
CHOLEST SERPL-MCNC: 114 MG/DL (ref 0–200)
CO2 SERPL-SCNC: 24 MMOL/L (ref 20–31)
CREAT BLD-MCNC: 0.9 MG/DL (ref 0.6–1.3)
GFR SERPL CREATININE-BSD FRML MDRD: 63 ML/MIN/1.73
GLUCOSE BLD-MCNC: 151 MG/DL (ref 70–100)
GLUCOSE BLDC GLUCOMTR-MCNC: 173 MG/DL (ref 70–130)
GLUCOSE BLDC GLUCOMTR-MCNC: 210 MG/DL (ref 70–130)
HDLC SERPL-MCNC: 27 MG/DL (ref 40–60)
POTASSIUM BLD-SCNC: 4.2 MMOL/L (ref 3.5–5.5)
SODIUM BLD-SCNC: 137 MMOL/L (ref 132–146)
TRIGL SERPL-MCNC: 79 MG/DL (ref 0–150)

## 2017-09-20 PROCEDURE — 99222 1ST HOSP IP/OBS MODERATE 55: CPT | Performed by: PSYCHIATRY & NEUROLOGY

## 2017-09-20 PROCEDURE — 97161 PT EVAL LOW COMPLEX 20 MIN: CPT

## 2017-09-20 PROCEDURE — 99231 SBSQ HOSP IP/OBS SF/LOW 25: CPT | Performed by: NEUROLOGICAL SURGERY

## 2017-09-20 PROCEDURE — 63710000001 INSULIN LISPRO (HUMAN) PER 5 UNITS: Performed by: INTERNAL MEDICINE

## 2017-09-20 PROCEDURE — 80061 LIPID PANEL: CPT | Performed by: INTERNAL MEDICINE

## 2017-09-20 PROCEDURE — 99239 HOSP IP/OBS DSCHRG MGMT >30: CPT | Performed by: FAMILY MEDICINE

## 2017-09-20 PROCEDURE — 82962 GLUCOSE BLOOD TEST: CPT

## 2017-09-20 PROCEDURE — 92507 TX SP LANG VOICE COMM INDIV: CPT

## 2017-09-20 PROCEDURE — 97165 OT EVAL LOW COMPLEX 30 MIN: CPT

## 2017-09-20 PROCEDURE — 80048 BASIC METABOLIC PNL TOTAL CA: CPT | Performed by: INTERNAL MEDICINE

## 2017-09-20 RX ORDER — LEVETIRACETAM 500 MG/1
500 TABLET ORAL EVERY 12 HOURS SCHEDULED
Qty: 60 TABLET | Refills: 0 | Status: SHIPPED | OUTPATIENT
Start: 2017-09-20 | End: 2017-10-12 | Stop reason: SDUPTHER

## 2017-09-20 RX ADMIN — VITAMIN D, TAB 1000IU (100/BT) 1000 UNITS: 25 TAB at 08:07

## 2017-09-20 RX ADMIN — Medication 1 TABLET: at 08:07

## 2017-09-20 RX ADMIN — INSULIN LISPRO 2 UNITS: 100 INJECTION, SOLUTION INTRAVENOUS; SUBCUTANEOUS at 08:07

## 2017-09-20 RX ADMIN — ALLOPURINOL 100 MG: 100 TABLET ORAL at 08:07

## 2017-09-20 RX ADMIN — PANTOPRAZOLE SODIUM 40 MG: 40 TABLET, DELAYED RELEASE ORAL at 06:07

## 2017-09-20 RX ADMIN — INSULIN LISPRO 2 UNITS: 100 INJECTION, SOLUTION INTRAVENOUS; SUBCUTANEOUS at 12:03

## 2017-09-20 RX ADMIN — LEVETIRACETAM 500 MG: 500 TABLET, FILM COATED ORAL at 08:07

## 2017-09-20 RX ADMIN — LISINOPRIL 10 MG: 10 TABLET ORAL at 08:07

## 2017-09-20 RX ADMIN — LEVOTHYROXINE SODIUM 224 MCG: 112 TABLET ORAL at 06:07

## 2017-09-22 LAB — CREAT BLDA-MCNC: 1.1 MG/DL (ref 0.6–1.3)

## 2017-09-27 ENCOUNTER — OFFICE VISIT (OUTPATIENT)
Dept: NEUROSURGERY | Facility: CLINIC | Age: 67
End: 2017-09-27

## 2017-09-27 VITALS — HEIGHT: 68 IN | WEIGHT: 222 LBS | TEMPERATURE: 97.6 F | BODY MASS INDEX: 33.65 KG/M2

## 2017-09-27 DIAGNOSIS — S06.5XAA SUBDURAL HEMATOMA (HCC): Primary | ICD-10-CM

## 2017-09-27 PROCEDURE — 99215 OFFICE O/P EST HI 40 MIN: CPT | Performed by: NEUROLOGICAL SURGERY

## 2017-09-27 NOTE — PROGRESS NOTES
Subjective     Chief Complaint: Aphasia    Patient ID: Nuris Ribeiro is a 66 y.o. female is here today for follow-up.    Other   This is a new problem. The current episode started 1 to 4 weeks ago. The problem occurs rarely. The problem has been resolved. Pertinent negatives include no abdominal pain, anorexia, arthralgias, change in bowel habit, chest pain, chills, congestion, coughing, diaphoresis, fatigue, fever, headaches, joint swelling, myalgias, nausea, neck pain, numbness, rash, sore throat, swollen glands, urinary symptoms, vertigo, visual change, vomiting or weakness. Nothing aggravates the symptoms. She has tried nothing for the symptoms. The treatment provided significant relief.       This is a 66-year-old woman who I saw in consultation in the hospital last week for a subacute subdural hematoma.  There was a question of a antecedent seizure, however when I saw her she was having some intermittent problems with word finding difficulty.  She reports that the symptoms have improved, and she states that her language has gotten completely back to normal.    She denies any numbness, tingling, weakness, strokelike symptoms, or seizures.    The following portions of the patient's history were reviewed and updated as appropriate: allergies, current medications, past family history, past medical history, past social history, past surgical history and problem list.    Family history:   Family History   Problem Relation Age of Onset   • Hypertension Mother    • Coronary artery disease Mother    • Macular degeneration Mother    • Other Mother      DYSLIPIDEMIA   • Leukemia Father    • Hypertension Father    • Diabetes Sister    • Stroke Sister    • Hypertension Sister    • Heart disease Maternal Grandfather        Social history:   Social History     Social History   • Marital status:      Spouse name: N/A   • Number of children: N/A   • Years of education: N/A     Occupational History   • Not on file.      Social History Main Topics   • Smoking status: Never Smoker   • Smokeless tobacco: Never Used   • Alcohol use No   • Drug use: No   • Sexual activity: Defer     Other Topics Concern   • Not on file     Social History Narrative    Patient is  with 2 adult children and lives in [place]. 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        Review of Systems   Constitutional: Negative for activity change, appetite change, chills, diaphoresis, fatigue, fever and unexpected weight change.   HENT: Negative for congestion, dental problem, drooling, ear discharge, ear pain, facial swelling, hearing loss, mouth sores, nosebleeds, postnasal drip, rhinorrhea, sinus pressure, sneezing, sore throat, tinnitus, trouble swallowing and voice change.    Eyes: Negative for photophobia, pain, discharge, redness, itching and visual disturbance.   Respiratory: Negative for apnea, cough, choking, chest tightness, shortness of breath, wheezing and stridor.    Cardiovascular: Negative for chest pain, palpitations and leg swelling.   Gastrointestinal: Negative for abdominal distention, abdominal pain, anal bleeding, anorexia, blood in stool, change in bowel habit, constipation, diarrhea, nausea, rectal pain and vomiting.   Endocrine: Negative for cold intolerance, heat intolerance, polydipsia, polyphagia and polyuria.   Genitourinary: Negative for decreased urine volume, difficulty urinating, dysuria, enuresis, flank pain, frequency, genital sores, hematuria and urgency.   Musculoskeletal: Negative for arthralgias, back pain, gait problem, joint swelling, myalgias, neck pain and neck stiffness.   Skin: Negative for color change, pallor, rash and wound.   Allergic/Immunologic: Negative for environmental allergies, food allergies and immunocompromised state.   Neurological: Positive for seizures, syncope and speech difficulty. Negative for dizziness, vertigo, tremors, facial  "asymmetry, weakness, light-headedness, numbness and headaches.   Hematological: Negative for adenopathy. Does not bruise/bleed easily.   Psychiatric/Behavioral: Negative for agitation, behavioral problems, confusion, decreased concentration, dysphoric mood, hallucinations, self-injury, sleep disturbance and suicidal ideas. The patient is not nervous/anxious and is not hyperactive.    All other systems reviewed and are negative.      Objective   Temperature 97.6 °F (36.4 °C), height 68\" (172.7 cm), weight 222 lb (101 kg).  Body mass index is 33.75 kg/(m^2).    Physical Exam   Constitutional: She is oriented to person, place, and time. She appears well-developed and well-nourished.  Non-toxic appearance. No distress.   HENT:   Head: Normocephalic and atraumatic.   Mouth/Throat: Oropharynx is clear and moist.   Eyes: EOM are normal. Pupils are equal, round, and reactive to light. Right eye exhibits no discharge. Left eye exhibits no discharge.   Neck: Phonation normal. No JVD present. No tracheal deviation present. No thyromegaly present.   Cardiovascular: Normal rate and regular rhythm.    Pulmonary/Chest: Effort normal. No stridor. No respiratory distress. She has no wheezes.   Abdominal: Soft. Normal appearance. She exhibits no distension. There is no tenderness.   Musculoskeletal: She exhibits no edema.        Left foot: There is decreased range of motion and tenderness.   Muscle Group     L          R  Deltoid                5          5  Bicep                  5          5  Tricep                 5          5                       5          5  Hand IO              5          5  Hip Flexor           5          5  Knee Extensor   5          5     ADF                    -         5  APF                    -          5       Walking boot left ankle secondary to broken bone   Neurological: She is alert and oriented to person, place, and time. She has normal reflexes. She displays normal reflexes. A sensory " deficit is present. No cranial nerve deficit. She exhibits normal muscle tone. She displays a negative Romberg sign. Gait abnormal. Coordination normal. GCS eye subscore is 4. GCS verbal subscore is 5. GCS motor subscore is 6.   Reflex Scores:       Tricep reflexes are 2+ on the right side and 2+ on the left side.       Bicep reflexes are 2+ on the right side and 2+ on the left side.       Brachioradialis reflexes are 2+ on the right side and 2+ on the left side.       Patellar reflexes are 2+ on the right side and 2+ on the left side.       Achilles reflexes are 2+ on the right side and 2+ on the left side.  CN II-XII grossly intact.    No pronator drift. FTN testing performed without dysmetria or bradykinesia.    Naming and repetition are intact    Stocking glove distribution decreased sensation to light touch   Skin: Skin is warm and dry. She is not diaphoretic. No erythema.   Psychiatric: She has a normal mood and affect. Her speech is normal and behavior is normal. Judgment and thought content normal.   Nursing note and vitals reviewed.        Assessment/Plan     Independent Review of Radiographic Studies:      She has no new imaging for me to review.  I did rereview her CT scan and MRI of the brain that were performed last week.  There is a moderately sized extra-axial fluid collection with mixed density consistent with a subacute subdural hematoma.  There is mild mass effect on the subjacent cortex.  There is no midline shift.    Medical Decision Making:      This is a 66-year-old woman with a subacute subdural hematoma.  She is asymptomatic and is not complaining of any new problems with seizures.  Her aphasia has resolved.    Her medical comorbidities include insulin-dependent diabetes, neuropathy, hypothyroidism and hypercholesterolemia    I reviewed the signs and symptoms of intracranial mass effect.  I directed her to contact my office with new, or worsening symptoms.  She is cleared to return to work  and to drive from my perspective.    I also directed her to resume her aspirin and Plavix.  We will continue her Keppra for now, and plan on weaning that often a month or so.    I would like to follow up with her in 1 month, or sooner if clinically indicated.  I will not plan on ordering any repeat imaging unless clinically indicated at this point.    Nuris was seen today for follow-up.    Diagnoses and all orders for this visit:    Subdural hematoma        Return in about 1 month (around 10/27/2017).           This document signed by LILIAN Thakkar MD September 27, 2017 11:22 AM

## 2017-10-12 ENCOUNTER — OFFICE VISIT (OUTPATIENT)
Dept: NEUROLOGY | Facility: CLINIC | Age: 67
End: 2017-10-12

## 2017-10-12 VITALS
HEIGHT: 68 IN | DIASTOLIC BLOOD PRESSURE: 60 MMHG | BODY MASS INDEX: 33.34 KG/M2 | WEIGHT: 220 LBS | SYSTOLIC BLOOD PRESSURE: 103 MMHG

## 2017-10-12 DIAGNOSIS — G40.109 SIMPLE PARTIAL SEIZURE DISORDER WITHOUT INTRACTABLE EPILEPSY (HCC): Primary | ICD-10-CM

## 2017-10-12 PROCEDURE — 99214 OFFICE O/P EST MOD 30 MIN: CPT | Performed by: PSYCHIATRY & NEUROLOGY

## 2017-10-12 RX ORDER — LEVETIRACETAM 500 MG/1
500 TABLET ORAL EVERY 12 HOURS SCHEDULED
Qty: 60 TABLET | Refills: 6 | Status: SHIPPED | OUTPATIENT
Start: 2017-10-12 | End: 2018-06-14

## 2017-10-12 NOTE — PROGRESS NOTES
Subjective   Nuris Ribeiro is a 67 y.o. female.     History of Present Illness   66 yo RH woman seen today for f/u from [facility] stay after admitted 9/20/17 for episodes of difficulty speaking. One morning trying to tell  she wanted peanut butter, and was unable to do so. Drove to a doctor's appt, where also unable to talk normally. Went on for hours. Words came out, but not the right ones.   denied any abnormal motor movements of the extremities, no visual changes, no somatosensory issues.  CT head in the ED was concerning for a left subdural hematoma and this was corroborated on MRI brain.  She had a routine EEG that was concerning for potentially ictal discharges in the left hemisphere (Continuous left hemispheric slowing, mild       Intermittent moderate left frontal temporal slowing, and at times, synchronous intermittent bilateral frontal temporal slowing     Brief, potentially ictal, rhythmic discharges (BIRDs), maximum left frontotemporal).  She was placed on Keppra 500 mg twice a day.  Has not recurred. No side effects at all on LVT.  No previous h/o seizures, no family h/o seizures.   Following up with Dr Thakkar.     The following portions of the patient's history were reviewed and updated as appropriate: allergies, current medications, past family history, past medical history, past social history, past surgical history and problem list.    Review of Systems   Constitutional: Negative for fatigue and fever.   Respiratory: Negative for cough and shortness of breath.    Cardiovascular: Negative for chest pain.   Musculoskeletal: Positive for gait problem.        Boot left foot       Objective   Physical Exam   Constitutional: She is oriented to person, place, and time. She appears well-developed and well-nourished.   HENT:   Head: Normocephalic and atraumatic.   Eyes: EOM are normal. Pupils are equal, round, and reactive to light.   Pulmonary/Chest: Effort normal.   Musculoskeletal:   Boot left foot    Neurological: She is oriented to person, place, and time. She has a normal Finger-Nose-Finger Test and a normal Heel to [place] Test.   Skin: Skin is warm and dry.   Psychiatric: Her speech is normal.   Nursing note and vitals reviewed.      Neurologic Exam     Mental Status   Oriented to person, place, and time.   Speech: speech is normal   Level of consciousness: alert  Knowledge: consistent with education.   Able to name object. Normal comprehension.     Cranial Nerves     CN II   Visual fields full to confrontation.     CN III, IV, VI   Pupils are equal, round, and reactive to light.  Extraocular motions are normal.     CN VII   Facial expression full, symmetric.     CN IX, X   CN IX normal.   CN X normal.   Palate: symmetric    CN XI   CN XI normal.     CN XII   CN XII normal.     Motor Exam   Muscle bulk: normal  Right arm pronator drift: absent  Left arm pronator drift: absent    Strength   Strength 5/5 except as noted.        Distal LLE not tested 2/2 boot     Gait, Coordination, and Reflexes     Gait  Gait: (inhibited by boot, wide based)    Coordination   Finger to nose coordination: normal  Heel to shin coordination: normal    Tremor   Resting tremor: absent  Intention tremor: absent  Action tremor: absent      Assessment/Plan   Nuris was seen today for seizures.    Diagnoses and all orders for this visit:    Simple partial seizure disorder without intractable epilepsy    Other orders  -     levETIRAcetam (KEPPRA) 500 MG tablet; Take 1 tablet by mouth Every 12 (Twelve) Hours.    Discussion/Summary:  Seizures 2/2 SDH have resolved with LVT, no SEs of any kind. Will plan 6 mos tx, at that point EEG, and if no seizures and normal EEG, will likely taper off medication. Discussed sx to monitor for, prognosis, what to do in case of recurrence, etc.  30 minutes face to face, 20 minutes in discussion as above.  Return in about 6 months (around 4/12/2018).

## 2017-10-13 ENCOUNTER — LAB (OUTPATIENT)
Dept: LAB | Facility: HOSPITAL | Age: 67
End: 2017-10-13

## 2017-10-13 ENCOUNTER — TRANSCRIBE ORDERS (OUTPATIENT)
Dept: LAB | Facility: HOSPITAL | Age: 67
End: 2017-10-13

## 2017-10-13 DIAGNOSIS — N18.30 CHRONIC KIDNEY DISEASE, STAGE III (MODERATE) (HCC): Primary | ICD-10-CM

## 2017-10-13 DIAGNOSIS — N18.30 CHRONIC KIDNEY DISEASE, STAGE III (MODERATE) (HCC): ICD-10-CM

## 2017-10-13 LAB
ALBUMIN SERPL-MCNC: 3.7 G/DL (ref 3.2–4.8)
ANION GAP SERPL CALCULATED.3IONS-SCNC: 4 MMOL/L (ref 3–11)
BUN BLD-MCNC: 25 MG/DL (ref 9–23)
BUN/CREAT SERPL: 25 (ref 7–25)
CALCIUM SPEC-SCNC: 9.7 MG/DL (ref 8.7–10.4)
CHLORIDE SERPL-SCNC: 106 MMOL/L (ref 99–109)
CO2 SERPL-SCNC: 30 MMOL/L (ref 20–31)
CREAT BLD-MCNC: 1 MG/DL (ref 0.6–1.3)
CREAT UR-MCNC: 112.6 MG/DL
GFR SERPL CREATININE-BSD FRML MDRD: 55 ML/MIN/1.73
GLUCOSE BLD-MCNC: 101 MG/DL (ref 70–100)
PHOSPHATE SERPL-MCNC: 4.3 MG/DL (ref 2.4–5.1)
POTASSIUM BLD-SCNC: 4 MMOL/L (ref 3.5–5.5)
SODIUM BLD-SCNC: 140 MMOL/L (ref 132–146)

## 2017-10-13 PROCEDURE — 80069 RENAL FUNCTION PANEL: CPT | Performed by: INTERNAL MEDICINE

## 2017-10-13 PROCEDURE — 36415 COLL VENOUS BLD VENIPUNCTURE: CPT

## 2017-10-13 PROCEDURE — 82043 UR ALBUMIN QUANTITATIVE: CPT | Performed by: INTERNAL MEDICINE

## 2017-10-13 PROCEDURE — 82570 ASSAY OF URINE CREATININE: CPT | Performed by: INTERNAL MEDICINE

## 2017-10-14 LAB — MICROALBUMIN UR-MCNC: 3.2 UG/ML

## 2017-10-30 ENCOUNTER — OFFICE VISIT (OUTPATIENT)
Dept: NEUROSURGERY | Facility: CLINIC | Age: 67
End: 2017-10-30

## 2017-10-30 VITALS
DIASTOLIC BLOOD PRESSURE: 60 MMHG | HEIGHT: 68 IN | WEIGHT: 221 LBS | TEMPERATURE: 98.5 F | SYSTOLIC BLOOD PRESSURE: 122 MMHG | BODY MASS INDEX: 33.49 KG/M2

## 2017-10-30 DIAGNOSIS — S06.5XAA SDH (SUBDURAL HEMATOMA) (HCC): Primary | ICD-10-CM

## 2017-10-30 PROCEDURE — 99214 OFFICE O/P EST MOD 30 MIN: CPT | Performed by: NEUROLOGICAL SURGERY

## 2017-10-30 RX ORDER — URSODIOL 250 MG/1
TABLET, FILM COATED ORAL 2 TIMES DAILY
COMMUNITY
End: 2017-12-13

## 2017-10-30 NOTE — PROGRESS NOTES
Subjective     Chief Complaint: Left sided subdural hematoma    Patient ID: Nuris Ribeiro is a 67 y.o. female is here today for follow-up.    Other   This is a new problem. The current episode started 1 to 4 weeks ago. The problem occurs rarely. The problem has been resolved. Pertinent negatives include no abdominal pain, anorexia, arthralgias, change in bowel habit, chest pain, chills, congestion, coughing, diaphoresis, fatigue, fever, headaches, joint swelling, myalgias, nausea, neck pain, numbness, rash, sore throat, swollen glands, urinary symptoms, vertigo, visual change, vomiting or weakness. Nothing aggravates the symptoms. She has tried nothing for the symptoms. The treatment provided significant relief.       This is a 67-year-old woman who presents to my office for follow-up after I was consult on her for a left sided subdural hematoma several months ago.  She has returned to work, denies any headaches, nausea, vomiting, strokelike symptoms, or seizures.    The following portions of the patient's history were reviewed and updated as appropriate: allergies, current medications, past family history, past medical history, past social history, past surgical history and problem list.    Family history:   Family History   Problem Relation Age of Onset   • Hypertension Mother    • Coronary artery disease Mother    • Macular degeneration Mother    • Other Mother      DYSLIPIDEMIA   • Leukemia Father    • Hypertension Father    • Diabetes Sister    • Stroke Sister    • Hypertension Sister    • Coronary artery disease Sister    • Heart disease Maternal Grandfather        Social history:   Social History     Social History   • Marital status:      Spouse name: N/A   • Number of children: N/A   • Years of education: N/A     Occupational History   • Not on file.     Social History Main Topics   • Smoking status: Never Smoker   • Smokeless tobacco: Never Used   • Alcohol use No   • Drug use: No   • Sexual  activity: Defer     Other Topics Concern   • Not on file     Social History Narrative    Patient is  with 2 adult children and lives in [place]. 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        Review of Systems   Constitutional: Negative for activity change, appetite change, chills, diaphoresis, fatigue, fever and unexpected weight change.   HENT: Negative for congestion, dental problem, drooling, ear discharge, ear pain, facial swelling, hearing loss, mouth sores, nosebleeds, postnasal drip, rhinorrhea, sinus pressure, sneezing, sore throat, tinnitus, trouble swallowing and voice change.    Eyes: Negative for photophobia, pain, discharge, redness, itching and visual disturbance.   Respiratory: Negative for apnea, cough, choking, chest tightness, shortness of breath, wheezing and stridor.    Cardiovascular: Negative for chest pain, palpitations and leg swelling.   Gastrointestinal: Negative for abdominal distention, abdominal pain, anal bleeding, anorexia, blood in stool, change in bowel habit, constipation, diarrhea, nausea, rectal pain and vomiting.   Endocrine: Negative for cold intolerance, heat intolerance, polydipsia, polyphagia and polyuria.   Genitourinary: Negative for decreased urine volume, difficulty urinating, dysuria, enuresis, flank pain, frequency, genital sores, hematuria and urgency.   Musculoskeletal: Negative for arthralgias, back pain, gait problem, joint swelling, myalgias, neck pain and neck stiffness.   Skin: Negative for color change, pallor, rash and wound.   Allergic/Immunologic: Negative for environmental allergies, food allergies and immunocompromised state.   Neurological: Positive for seizures, syncope and speech difficulty. Negative for dizziness, vertigo, tremors, facial asymmetry, weakness, light-headedness, numbness and headaches.   Hematological: Negative for adenopathy. Does not bruise/bleed easily.  "  Psychiatric/Behavioral: Negative for agitation, behavioral problems, confusion, decreased concentration, dysphoric mood, hallucinations, self-injury, sleep disturbance and suicidal ideas. The patient is not nervous/anxious and is not hyperactive.    All other systems reviewed and are negative.      Objective   Blood pressure 122/60, temperature 98.5 °F (36.9 °C), height 68\" (172.7 cm), weight 221 lb (100 kg).  Body mass index is 33.6 kg/(m^2).    Physical Exam   Constitutional: She is oriented to person, place, and time. She appears well-developed and well-nourished.  Non-toxic appearance. No distress.   HENT:   Head: Normocephalic and atraumatic.   Mouth/Throat: Oropharynx is clear and moist.   Eyes: EOM are normal. Pupils are equal, round, and reactive to light. Right eye exhibits no discharge. Left eye exhibits no discharge.   Neck: Phonation normal. No JVD present. No tracheal deviation present. No thyromegaly present.   Cardiovascular: Normal rate and regular rhythm.    Pulmonary/Chest: Effort normal. No stridor. No respiratory distress. She has no wheezes.   Abdominal: Soft. Normal appearance. She exhibits no distension. There is no tenderness.   Musculoskeletal: She exhibits no edema.        Left foot: There is decreased range of motion and tenderness.   Muscle Group     L          R  Deltoid                5          5  Bicep                  5          5  Tricep                 5          5                       5          5  Hand IO              5          5  Hip Flexor           5          5  Knee Extensor   5          5     ADF                    -         5  APF                    -          5       Walking boot left ankle secondary to broken bone   Neurological: She is alert and oriented to person, place, and time. She has normal reflexes. She displays normal reflexes. A sensory deficit is present. No cranial nerve deficit. She exhibits normal muscle tone. She displays a negative Romberg sign. " Gait abnormal. Coordination normal. GCS eye subscore is 4. GCS verbal subscore is 5. GCS motor subscore is 6.   Reflex Scores:       Tricep reflexes are 2+ on the right side and 2+ on the left side.       Bicep reflexes are 2+ on the right side and 2+ on the left side.       Brachioradialis reflexes are 2+ on the right side and 2+ on the left side.       Patellar reflexes are 2+ on the right side and 2+ on the left side.       Achilles reflexes are 2+ on the right side and 2+ on the left side.  CN II-XII grossly intact.    No pronator drift. FTN testing performed without dysmetria or bradykinesia.    Naming and repetition are intact    Stocking glove distribution decreased sensation to light touch   Skin: Skin is warm and dry. She is not diaphoretic. No erythema.   Psychiatric: She has a normal mood and affect. Her speech is normal and behavior is normal. Judgment and thought content normal.   Nursing note and vitals reviewed.        Assessment/Plan     Independent Review of Radiographic Studies:      She has no new imaging for me to review    Medical Decision Making:      This is a 67-year-old woman who is recovering from a left sided subdural hematoma.  She is asymptomatic, and has returned to work.  She is still taking Keppra but has not had any seizure activity.  My recommendation is that she continue Keppra for additional 3-6 months.  We can wean her off at that time.  She can follow-up with me on an as-needed basis    There are no diagnoses linked to this encounter.    Return if symptoms worsen or fail to improve.           This document signed by LILIAN Thakkar MD October 30, 2017 10:49 AM

## 2017-12-13 ENCOUNTER — OFFICE VISIT (OUTPATIENT)
Dept: BARIATRICS/WEIGHT MGMT | Facility: CLINIC | Age: 67
End: 2017-12-13

## 2017-12-13 VITALS
WEIGHT: 213.5 LBS | SYSTOLIC BLOOD PRESSURE: 127 MMHG | DIASTOLIC BLOOD PRESSURE: 71 MMHG | OXYGEN SATURATION: 99 % | BODY MASS INDEX: 32.36 KG/M2 | RESPIRATION RATE: 18 BRPM | TEMPERATURE: 98 F | HEIGHT: 68 IN | HEART RATE: 85 BPM

## 2017-12-13 DIAGNOSIS — E66.9 DIABETES MELLITUS TYPE 2 IN OBESE (HCC): ICD-10-CM

## 2017-12-13 DIAGNOSIS — E55.9 VITAMIN D DEFICIENCY: ICD-10-CM

## 2017-12-13 DIAGNOSIS — Z98.84 S/P BARIATRIC SURGERY: ICD-10-CM

## 2017-12-13 DIAGNOSIS — E66.9 OBESITY, CLASS I, BMI 30-34.9: ICD-10-CM

## 2017-12-13 DIAGNOSIS — E11.69 DIABETES MELLITUS TYPE 2 IN OBESE (HCC): ICD-10-CM

## 2017-12-13 DIAGNOSIS — I10 ESSENTIAL HYPERTENSION: ICD-10-CM

## 2017-12-13 DIAGNOSIS — R53.83 FATIGUE, UNSPECIFIED TYPE: Primary | ICD-10-CM

## 2017-12-13 PROBLEM — E86.9 VOLUME DEPLETION: Status: RESOLVED | Noted: 2017-09-12 | Resolved: 2017-12-13

## 2017-12-13 PROBLEM — E66.01 MORBID OBESITY DUE TO EXCESS CALORIES (HCC): Status: RESOLVED | Noted: 2017-01-08 | Resolved: 2017-12-13

## 2017-12-13 PROBLEM — R55 SYNCOPE: Status: RESOLVED | Noted: 2017-09-12 | Resolved: 2017-12-13

## 2017-12-13 PROCEDURE — 99214 OFFICE O/P EST MOD 30 MIN: CPT | Performed by: PHYSICIAN ASSISTANT

## 2017-12-13 RX ORDER — CLOPIDOGREL BISULFATE 75 MG/1
75 TABLET ORAL DAILY
COMMUNITY
Start: 2017-10-26 | End: 2022-05-25 | Stop reason: HOSPADM

## 2017-12-13 RX ORDER — LISINOPRIL 2.5 MG/1
2.5 TABLET ORAL DAILY
COMMUNITY
Start: 2017-11-13

## 2017-12-13 RX ORDER — HYDROCHLOROTHIAZIDE 12.5 MG/1
12.5 TABLET ORAL DAILY
COMMUNITY
Start: 2017-12-08

## 2017-12-13 NOTE — PROGRESS NOTES
"[facility] Bariatric Surgery   [address]  [facility] [id number] [phone]        Patient Name:  Nuris Ribeiro.  :  1950      Date of Visit: 2017      Reason for Visit:   6 months postop      HPI: Nuris Ribeiro is a 67 y.o. female s/p LSG by GDW on 17    Since last visit had a seizure, fell, and suffered a SDH.  Following w/ Neurology and has been started on Keppra which they plan to continue for at least 6 months.  Has had no further seizures.  Unfortunately continues to struggle w/ her (L) Charcot foot.  Says they are now talking about having to perform surgery.    Only complaint from a bariatric standpoint is hair loss.  Getting 70g prot/day.  Taking Biotin 5000mcg + MVI, Vit D and Vit B12.  Bariatric labs 17 WNL.       Presurgery weight: 290 pounds.  Today's weight is 96.8 kg (213 lb 8 oz) pounds, today's  Body mass index is 32.46 kg/(m^2)., and her weight loss since surgery is 77 pounds.      Past Medical History:   Diagnosis Date   • Abnormal CXR     worsening chronic changes, see report   • CKD (chronic kidney disease), stage III     BUN/Cr .40 gfr 38  followed by nephrology   • Coronary artery disease     s/p stenting , on ASA/Plavix, followed by Dr. Buck   • Diabetes mellitus     Dx 30+ yrs ago, thinks insulin last 15+ years.  \"I have not been a good diabetic\" - says didn't check her sugars for years.  A1c >9   • Diabetic peripheral neuropathy    • Diastasis of rectus abdominis     massive   • Dyslipidemia    • Dyspepsia     rare. no QUEENIE sx's or RUQ pains.  serum h. pyl neg   • Dyspnea on exertion    • Fatigue    • Gangrene     (R) 5th toe amputated   • Gas gangrene    • Gout     recently started on allopurinol but doesn't know why   • History of DVT (deep vein thrombosis)     years ago, etiology unclear, tx w/ Warfarin   • Hypertension    • Hypothyroidism    • Leukocytosis     13.98, asx   • Morbid obesity    • Peripheral " edema    • PVD (peripheral vascular disease)     w/ cellulitis of RLE 1/2017 tx w/ Vanc/Rocephin   • Venous stasis dermatitis     R>L, with phelbitis s/p Rocephin/Flagyl 1/17   • Ventral hernia     periumbilical assoc w large rectus diastasis, chronic incarc   • Vitamin D deficiency    • Wears glasses      Past Surgical History:   Procedure Laterality Date   • COLONOSCOPY  2015   • CORONARY ANGIOPLASTY WITH STENT PLACEMENT  2008   • GASTRIC SLEEVE LAPAROSCOPIC N/A 6/2/2017    Procedure: GASTRIC SLEEVE LAPAROSCOPIC;  Surgeon: Tiago Obregon MD;  Location: [facility];  Service:    • TOE AMPUTATION Right 2015    5th toe, d/t gangrene   • TONSILLECTOMY  1967     Outpatient Prescriptions Marked as Taking for the 12/13/17 encounter (Office Visit) with JM Pham   Medication Sig Dispense Refill   • allopurinol (ZYLOPRIM) 100 MG tablet Take 100 mg by mouth Daily.     • aspirin 81 MG tablet Take 81 mg by mouth Daily.     • cholecalciferol (VITAMIN D3) 1000 UNITS tablet Take 1,000 Units by mouth Daily.     • clopidogrel (PLAVIX) 75 MG tablet      • hydrochlorothiazide (HYDRODIURIL) 12.5 MG tablet      • insulin detemir (LEVEMIR) 100 UNIT/ML injection Inject 5 Units under the skin Every Night.  12   • insulin lispro (humaLOG) 100 UNIT/ML injection Inject 3 Units under the skin 3 (Three) Times a Day Before Meals.  12   • levETIRAcetam (KEPPRA) 500 MG tablet Take 1 tablet by mouth Every 12 (Twelve) Hours. 60 tablet 6   • levothyroxine (SYNTHROID, LEVOTHROID) 112 MCG tablet Take 224 mcg by mouth Daily. Takes two daily     • lisinopril (PRINIVIL,ZESTRIL) 2.5 MG tablet      • Multiple Vitamin (MULTI VITAMIN PO) Take 1 tablet by mouth Daily.     • simvastatin (ZOCOR) 40 MG tablet Take 40 mg by mouth Every Night.     • [DISCONTINUED] lisinopril (PRINIVIL,ZESTRIL) 10 MG tablet Take 1 tablet by mouth Daily. (Patient taking differently: Take 2.5 mg by mouth Daily.) 30 tablet 2   • [DISCONTINUED] ursodiol (ACTIGALL) 250  "MG tablet Take  by mouth 2 (Two) Times a Day.         Allergies   Allergen Reactions   • Amoxicillin Rash     can tolerate Rocephin       Social History     Social History   • Marital status:      Spouse name: N/A   • Number of children: N/A   • Years of education: N/A     Occupational History   • Not on file.     Social History Main Topics   • Smoking status: Never Smoker   • Smokeless tobacco: Never Used   • Alcohol use No   • Drug use: No   • Sexual activity: Defer     Other Topics Concern   • Not on file     Social History Narrative    Patient is  with 2 adult children and lives in [place]. 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        /71 (BP Location: Left arm, Patient Position: Sitting, Cuff Size: Large Adult)  Pulse 85  Temp 98 °F (36.7 °C) (Temporal Artery )   Resp 18  Ht 172.7 cm (68\")  Wt 96.8 kg (213 lb 8 oz)  SpO2 99%  BMI 32.46 kg/m2    Physical Exam   Constitutional: She appears well-developed and well-nourished.   Eyes: No scleral icterus.   Pulmonary/Chest: Effort normal. No respiratory distress.   Abdominal: Soft. She exhibits no distension and no mass. There is no tenderness. A hernia is present.   Musculoskeletal:   left foot in boot   Neurological: She is alert.   Skin: Skin is warm and dry.   Psychiatric: She has a normal mood and affect. Her behavior is normal. Judgment normal.         Assessment:  6 months s/p LSG by GDW on 6/2/17    ICD-10-CM ICD-9-CM   1. Fatigue, unspecified type R53.83 780.79   2. Essential hypertension I10 401.9   3. Diabetes mellitus type 2 in obese E11.69 250.00    E66.9 278.00   4. Vitamin D deficiency E55.9 268.9   5. Obesity, Class I, BMI 30-34.9 E66.9 278.00   6. S/P bariatric surgery Z98.84 V45.86       Plan:  Encouraged to increase protein >100g/day.  Continue w/ good food choices and healthy habits.  Increase exercise/acitivity as able.  Routine bariatric labs ordered.  Continue " vitamins w/ adjustments pending lab results.  Call w/ problems/concerns.     The patient was instructed to follow up in 3 months, sooner if needed.    Total time spent w/ patient 25 minutes and 15 minutes spent counseling the patient on nutrition and necessary dietary/lifestyle modifications.      JM Pham

## 2017-12-20 LAB
25(OH)D3+25(OH)D2 SERPL-MCNC: 42 NG/ML
ALBUMIN SERPL-MCNC: 3.6 G/DL (ref 3.2–4.8)
ALBUMIN/GLOB SERPL: 1.3 G/DL (ref 1.5–2.5)
ALP SERPL-CCNC: 85 U/L (ref 25–100)
ALT SERPL-CCNC: 15 U/L (ref 7–40)
AST SERPL-CCNC: 13 U/L (ref 0–33)
BASOPHILS # BLD AUTO: 0.04 10*3/MM3 (ref 0–0.2)
BASOPHILS NFR BLD AUTO: 0.3 % (ref 0–1)
BILIRUB SERPL-MCNC: 0.6 MG/DL (ref 0.3–1.2)
BUN SERPL-MCNC: 30 MG/DL (ref 9–23)
BUN/CREAT SERPL: 30 (ref 7–25)
CALCIUM SERPL-MCNC: 9.4 MG/DL (ref 8.7–10.4)
CHLORIDE SERPL-SCNC: 104 MMOL/L (ref 99–109)
CO2 SERPL-SCNC: 25 MMOL/L (ref 20–31)
CREAT SERPL-MCNC: 1 MG/DL (ref 0.6–1.3)
EOSINOPHIL # BLD AUTO: 0.3 10*3/MM3 (ref 0–0.3)
EOSINOPHIL NFR BLD AUTO: 2.3 % (ref 0–3)
ERYTHROCYTE [DISTWIDTH] IN BLOOD BY AUTOMATED COUNT: 13.4 % (ref 11.3–14.5)
FERRITIN SERPL-MCNC: 80 NG/ML (ref 10–291)
FOLATE SERPL-MCNC: >24 NG/ML (ref 3.2–20)
GLOBULIN SER CALC-MCNC: 2.8 GM/DL
GLUCOSE SERPL-MCNC: 137 MG/DL (ref 70–100)
HCT VFR BLD AUTO: 45.6 % (ref 34.5–44)
HGB BLD-MCNC: 15.2 G/DL (ref 11.5–15.5)
IMM GRANULOCYTES # BLD: 0.04 10*3/MM3 (ref 0–0.03)
IMM GRANULOCYTES NFR BLD: 0.3 % (ref 0–0.6)
IRON SERPL-MCNC: 113 MCG/DL (ref 50–175)
LYMPHOCYTES # BLD AUTO: 2.9 10*3/MM3 (ref 0.6–4.8)
LYMPHOCYTES NFR BLD AUTO: 21.9 % (ref 24–44)
MCH RBC QN AUTO: 29.8 PG (ref 27–31)
MCHC RBC AUTO-ENTMCNC: 33.3 G/DL (ref 32–36)
MCV RBC AUTO: 89.4 FL (ref 80–99)
METHYLMALONATE SERPL-SCNC: 315 NMOL/L (ref 0–378)
MONOCYTES # BLD AUTO: 0.61 10*3/MM3 (ref 0–1)
MONOCYTES NFR BLD AUTO: 4.6 % (ref 0–12)
NEUTROPHILS # BLD AUTO: 9.37 10*3/MM3 (ref 1.5–8.3)
NEUTROPHILS NFR BLD AUTO: 70.6 % (ref 41–71)
PLATELET # BLD AUTO: 399 10*3/MM3 (ref 150–450)
POTASSIUM SERPL-SCNC: 4.5 MMOL/L (ref 3.5–5.5)
PREALB SERPL-MCNC: 19 MG/DL (ref 10–36)
PROT SERPL-MCNC: 6.4 G/DL (ref 5.7–8.2)
RBC # BLD AUTO: 5.1 10*6/MM3 (ref 3.89–5.14)
SODIUM SERPL-SCNC: 138 MMOL/L (ref 132–146)
VIT B1 BLD-SCNC: 195.3 NMOL/L (ref 66.5–200)
WBC # BLD AUTO: 13.26 10*3/MM3 (ref 3.5–10.8)

## 2018-03-15 NOTE — PROGRESS NOTES
"[facility] Bariatric Surgery   [address]  [facility] [id number] [phone]        Patient Name:  Nuris Ribeiro.  :  1950      Date of Visit: 3/16/2018      Reason for Visit:   9 months postop      HPI: Nuris Ribeiro is a 67 y.o. female s/p  LSG by GDW on 17    Since surgery, has suffered a fall with SDH and resultant seizures, started Keppra.  Also has struggled with healing of Charcot food.  Is now in a hard boot on the LLE.  She is on less insulin than before surgery.      Doing well.  No issues/concerns. Denies dysphagia, reflux, nausea, vomiting and abdominal pain.  Getting 70-80g prot/day.  6 month labs revealed low B12, instructed to take 2500 mcg per day.  Did not check MyChart. Taking MVI, B12 and Calcium.  Exercise: no exercise, limited from Charcot foot.  Has not done any physical therapy.       Presurgery weight: 290 pounds.  Today's weight is 95.7 kg (211 lb) pounds, today's  Body mass index is 32.08 kg/m²., and her weight loss since surgery is 79 pounds.      Past Medical History:   Diagnosis Date   • Abnormal CXR     worsening chronic changes, see report   • CKD (chronic kidney disease), stage III     BUN/Cr .40 gfr 38  followed by nephrology   • Coronary artery disease     s/p stenting , on ASA/Plavix, followed by Dr. Buck   • Diabetes mellitus     Dx 30+ yrs ago, thinks insulin last 15+ years.  \"I have not been a good diabetic\" - says didn't check her sugars for years.  A1c >9   • Diabetic peripheral neuropathy    • Diastasis of rectus abdominis     massive   • Dyslipidemia    • Dyspepsia     rare. no QUEENIE sx's or RUQ pains.  serum h. pyl neg   • Dyspnea on exertion    • Fatigue    • Gangrene     (R) 5th toe amputated   • Gas gangrene    • Gout     recently started on allopurinol but doesn't know why   • History of DVT (deep vein thrombosis)     years ago, etiology unclear, tx w/ Warfarin   • Hypertension    • Hypothyroidism  "   • Leukocytosis     13.98, asx   • Morbid obesity    • Peripheral edema    • PVD (peripheral vascular disease)     w/ cellulitis of RLE 1/2017 tx w/ Vanc/Rocephin   • Venous stasis dermatitis     R>L, with phelbitis s/p Rocephin/Flagyl 1/17   • Ventral hernia     periumbilical assoc w large rectus diastasis, chronic incarc   • Vitamin D deficiency    • Wears glasses      Past Surgical History:   Procedure Laterality Date   • COLONOSCOPY  2015   • CORONARY ANGIOPLASTY WITH STENT PLACEMENT  2008   • GASTRIC SLEEVE LAPAROSCOPIC N/A 6/2/2017    Procedure: GASTRIC SLEEVE LAPAROSCOPIC;  Surgeon: Tiago Obregon MD;  Location: [facility];  Service:    • TOE AMPUTATION Right 2015    5th toe, d/t gangrene   • TONSILLECTOMY  1967     Outpatient Prescriptions Marked as Taking for the 3/16/18 encounter (Office Visit) with Sherrill Rangel MD   Medication Sig Dispense Refill   • allopurinol (ZYLOPRIM) 100 MG tablet Take 100 mg by mouth Daily.     • aspirin 81 MG tablet Take 81 mg by mouth Daily.     • cholecalciferol (VITAMIN D3) 1000 UNITS tablet Take 1,000 Units by mouth Daily.     • clopidogrel (PLAVIX) 75 MG tablet      • hydrochlorothiazide (HYDRODIURIL) 12.5 MG tablet      • insulin detemir (LEVEMIR) 100 UNIT/ML injection Inject 5 Units under the skin Every Night.  12   • insulin lispro (humaLOG) 100 UNIT/ML injection Inject 3 Units under the skin 3 (Three) Times a Day Before Meals.  12   • levETIRAcetam (KEPPRA) 500 MG tablet Take 1 tablet by mouth Every 12 (Twelve) Hours. 60 tablet 6   • levothyroxine (SYNTHROID, LEVOTHROID) 112 MCG tablet Take 224 mcg by mouth Daily. Takes two daily     • lisinopril (PRINIVIL,ZESTRIL) 2.5 MG tablet      • Multiple Vitamin (MULTI VITAMIN PO) Take 1 tablet by mouth Daily.     • simvastatin (ZOCOR) 40 MG tablet Take 40 mg by mouth Every Night.         Allergies   Allergen Reactions   • Amoxicillin Rash     can tolerate Rocephin       Social History     Social History   • Marital  "status:      Spouse name: N/A   • Number of children: N/A   • Years of education: N/A     Occupational History   • Not on file.     Social History Main Topics   • Smoking status: Never Smoker   • Smokeless tobacco: Never Used   • Alcohol use No   • Drug use: No   • Sexual activity: Defer     Other Topics Concern   • Not on file     Social History Narrative    Patient is  with 2 adult children and lives in [place]. 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        /78 (BP Location: Left arm, Patient Position: Sitting, Cuff Size: Large Adult)   Pulse 76   Temp 97.7 °F (36.5 °C) (Temporal Artery )   Resp 18   Ht 172.7 cm (68\")   Wt 95.7 kg (211 lb)   SpO2 99%   BMI 32.08 kg/m²     Physical Exam   Constitutional: She is oriented to person, place, and time. She appears well-developed and well-nourished. No distress.   HENT:   Head: Normocephalic and atraumatic.   Mouth/Throat: No oropharyngeal exudate.   Eyes: Conjunctivae and EOM are normal. Pupils are equal, round, and reactive to light.   Pulmonary/Chest: Effort normal. No respiratory distress.   Abdominal: Soft. She exhibits no distension.   Musculoskeletal:   LLE in boot   Neurological: She is alert and oriented to person, place, and time. No cranial nerve deficit.   Skin: Skin is warm and dry. No rash noted. She is not diaphoretic. No erythema.   Psychiatric: She has a normal mood and affect. Her behavior is normal. Judgment and thought content normal.         Assessment:  9 months s/p  LSG by SANA on 6/2/17    ICD-10-CM ICD-9-CM   1. Fatigue, unspecified type R53.83 780.79   2. Essential hypertension I10 401.9   3. Diabetes mellitus type 2 in obese E11.69 250.00    E66.9 278.00   4. S/P bariatric surgery Z98.84 V45.86   5. Malnutrition screen Z13.21 V77.2   6. Screening, iron deficiency anemia Z13.0 V78.0   7. 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 Hypovitaminosis D " E55.9 268.9         Plan:  Doing well. Continue w/ good food choices and healthy habits.  Continue protein >70g/day.  Continue routine exercise.  Routine bariatric labs ordered.  Continue vitamins w/ adjustments pending lab results.  Call w/ problems/concerns.     Replace non-protein snacks (rice cakes) with protein-containing ones.  Assess calories, strive for roughly 1200 per day.  Increase protein to 100 g/day to help with hair loss and healing of Charcot foot.      The patient was instructed to follow up in 3 months, sooner if needed.    note: approx 15 of the 25 minute visit was spent counseling on nutrition and necessary dietary/lifestyle modifications.    Sherrill Rangel MD

## 2018-03-16 ENCOUNTER — OFFICE VISIT (OUTPATIENT)
Dept: BARIATRICS/WEIGHT MGMT | Facility: CLINIC | Age: 68
End: 2018-03-16

## 2018-03-16 VITALS
BODY MASS INDEX: 31.98 KG/M2 | RESPIRATION RATE: 18 BRPM | TEMPERATURE: 97.7 F | WEIGHT: 211 LBS | SYSTOLIC BLOOD PRESSURE: 128 MMHG | DIASTOLIC BLOOD PRESSURE: 78 MMHG | OXYGEN SATURATION: 99 % | HEART RATE: 76 BPM | HEIGHT: 68 IN

## 2018-03-16 DIAGNOSIS — Z90.3 POSTGASTRECTOMY MALABSORPTION: ICD-10-CM

## 2018-03-16 DIAGNOSIS — Z98.84 S/P BARIATRIC SURGERY: ICD-10-CM

## 2018-03-16 DIAGNOSIS — Z13.21 MALNUTRITION SCREEN: ICD-10-CM

## 2018-03-16 DIAGNOSIS — E66.9 DIABETES MELLITUS TYPE 2 IN OBESE (HCC): ICD-10-CM

## 2018-03-16 DIAGNOSIS — R53.83 FATIGUE, UNSPECIFIED TYPE: Primary | ICD-10-CM

## 2018-03-16 DIAGNOSIS — I10 ESSENTIAL HYPERTENSION: ICD-10-CM

## 2018-03-16 DIAGNOSIS — E11.69 DIABETES MELLITUS TYPE 2 IN OBESE (HCC): ICD-10-CM

## 2018-03-16 DIAGNOSIS — E55.9 HYPOVITAMINOSIS D: ICD-10-CM

## 2018-03-16 DIAGNOSIS — K91.2 POSTGASTRECTOMY MALABSORPTION: ICD-10-CM

## 2018-03-16 DIAGNOSIS — Z13.0 SCREENING, IRON DEFICIENCY ANEMIA: ICD-10-CM

## 2018-03-16 PROCEDURE — 99214 OFFICE O/P EST MOD 30 MIN: CPT | Performed by: SURGERY

## 2018-03-19 LAB
25(OH)D3+25(OH)D2 SERPL-MCNC: 51.8 NG/ML
ALBUMIN SERPL-MCNC: 3.9 G/DL (ref 3.2–4.8)
ALBUMIN/GLOB SERPL: 1.3 G/DL (ref 1.5–2.5)
ALP SERPL-CCNC: 98 U/L (ref 25–100)
ALT SERPL-CCNC: 27 U/L (ref 7–40)
AST SERPL-CCNC: 22 U/L (ref 0–33)
BASOPHILS # BLD AUTO: 0.05 10*3/MM3 (ref 0–0.2)
BASOPHILS NFR BLD AUTO: 0.4 % (ref 0–1)
BILIRUB SERPL-MCNC: 0.6 MG/DL (ref 0.3–1.2)
BUN SERPL-MCNC: 32 MG/DL (ref 9–23)
BUN/CREAT SERPL: 32 (ref 7–25)
CALCIUM SERPL-MCNC: 9.5 MG/DL (ref 8.7–10.4)
CHLORIDE SERPL-SCNC: 98 MMOL/L (ref 99–109)
CO2 SERPL-SCNC: 27 MMOL/L (ref 20–31)
CREAT SERPL-MCNC: 1 MG/DL (ref 0.6–1.3)
EOSINOPHIL # BLD AUTO: 0.3 10*3/MM3 (ref 0–0.3)
EOSINOPHIL NFR BLD AUTO: 2.4 % (ref 0–3)
ERYTHROCYTE [DISTWIDTH] IN BLOOD BY AUTOMATED COUNT: 12.9 % (ref 11.3–14.5)
FERRITIN SERPL-MCNC: 78 NG/ML (ref 10–291)
FOLATE SERPL-MCNC: >24 NG/ML (ref 3.2–20)
GFR SERPLBLD CREATININE-BSD FMLA CKD-EPI: 55 ML/MIN/1.73
GFR SERPLBLD CREATININE-BSD FMLA CKD-EPI: 67 ML/MIN/1.73
GLOBULIN SER CALC-MCNC: 3 GM/DL
GLUCOSE SERPL-MCNC: 144 MG/DL (ref 70–100)
HCT VFR BLD AUTO: 49.4 % (ref 34.5–44)
HGB BLD-MCNC: 16.5 G/DL (ref 11.5–15.5)
IMM GRANULOCYTES # BLD: 0.03 10*3/MM3 (ref 0–0.03)
IMM GRANULOCYTES NFR BLD: 0.2 % (ref 0–0.6)
IRON SERPL-MCNC: 126 MCG/DL (ref 50–175)
LYMPHOCYTES # BLD AUTO: 2.77 10*3/MM3 (ref 0.6–4.8)
LYMPHOCYTES NFR BLD AUTO: 21.9 % (ref 24–44)
MCH RBC QN AUTO: 30 PG (ref 27–31)
MCHC RBC AUTO-ENTMCNC: 33.4 G/DL (ref 32–36)
MCV RBC AUTO: 89.8 FL (ref 80–99)
METHYLMALONATE SERPL-SCNC: 251 NMOL/L (ref 0–378)
MONOCYTES # BLD AUTO: 0.73 10*3/MM3 (ref 0–1)
MONOCYTES NFR BLD AUTO: 5.8 % (ref 0–12)
NEUTROPHILS # BLD AUTO: 8.76 10*3/MM3 (ref 1.5–8.3)
NEUTROPHILS NFR BLD AUTO: 69.3 % (ref 41–71)
PLATELET # BLD AUTO: 390 10*3/MM3 (ref 150–450)
POTASSIUM SERPL-SCNC: 5.6 MMOL/L (ref 3.5–5.5)
PREALB SERPL-MCNC: 22 MG/DL (ref 10–36)
PROT SERPL-MCNC: 6.9 G/DL (ref 5.7–8.2)
RBC # BLD AUTO: 5.5 10*6/MM3 (ref 3.89–5.14)
SODIUM SERPL-SCNC: 143 MMOL/L (ref 132–146)
VIT B1 BLD-SCNC: 206.8 NMOL/L (ref 66.5–200)
WBC # BLD AUTO: 12.64 10*3/MM3 (ref 3.5–10.8)

## 2018-03-23 ENCOUNTER — TELEPHONE (OUTPATIENT)
Dept: NEUROLOGY | Facility: CLINIC | Age: 68
End: 2018-03-23

## 2018-03-23 DIAGNOSIS — R56.9 GENERALIZED CONVULSIVE SEIZURES (HCC): Primary | ICD-10-CM

## 2018-03-23 NOTE — TELEPHONE ENCOUNTER
----- Message from Poonam Ravi MD sent at 3/23/2018 12:09 PM EDT -----  Contact: Nuris   It would be advisable/safest to get an EEG before tapering off the medication. I'll put in the order now. It would be best to talk in person at 4/12/18 appt, so I can go over the taper and discuss sx to monitor for.   ----- Message -----  From: Patricia Danielle Snellen, CMA  Sent: 3/23/2018  11:28 AM  To: Poonam Ravi MD        ----- Message -----  From: Soledad Fleming  Sent: 3/23/2018  10:41 AM  To: [facility] Clinical [place]    Breonna Lawler said Breonna only put her only Keppra for a temperary basis and she hasn't had a seizure since she's been on it. She feels ready to be weaned off of it and she wanted to know if Breonna thinks its time to?    Please call Nuris back  [phone]

## 2018-03-23 NOTE — TELEPHONE ENCOUNTER
Returned pts call informed her of  instructions, she verbalized understanding and will complete EEG and discuss at F/U visit.

## 2018-03-29 ENCOUNTER — APPOINTMENT (OUTPATIENT)
Dept: NEUROLOGY | Facility: HOSPITAL | Age: 68
End: 2018-03-29
Attending: PSYCHIATRY & NEUROLOGY

## 2018-04-03 ENCOUNTER — HOSPITAL ENCOUNTER (OUTPATIENT)
Dept: NEUROLOGY | Facility: HOSPITAL | Age: 68
Discharge: HOME | End: 2018-04-03
Attending: PSYCHIATRY & NEUROLOGY | Admitting: PSYCHIATRY & NEUROLOGY

## 2018-04-03 DIAGNOSIS — R56.9 GENERALIZED CONVULSIVE SEIZURES (HCC): ICD-10-CM

## 2018-04-03 PROCEDURE — 95819 EEG AWAKE AND ASLEEP: CPT

## 2018-04-10 ENCOUNTER — TELEPHONE (OUTPATIENT)
Dept: BARIATRICS/WEIGHT MGMT | Facility: CLINIC | Age: 68
End: 2018-04-10

## 2018-04-12 ENCOUNTER — OFFICE VISIT (OUTPATIENT)
Dept: NEUROLOGY | Facility: CLINIC | Age: 68
End: 2018-04-12

## 2018-04-12 VITALS
BODY MASS INDEX: 31.98 KG/M2 | SYSTOLIC BLOOD PRESSURE: 116 MMHG | DIASTOLIC BLOOD PRESSURE: 62 MMHG | HEIGHT: 68 IN | WEIGHT: 211 LBS

## 2018-04-12 DIAGNOSIS — G40.109 SIMPLE PARTIAL SEIZURE DISORDER WITHOUT INTRACTABLE EPILEPSY (HCC): Primary | ICD-10-CM

## 2018-04-12 DIAGNOSIS — S06.5XAA SDH (SUBDURAL HEMATOMA) (HCC): ICD-10-CM

## 2018-04-12 PROCEDURE — 99213 OFFICE O/P EST LOW 20 MIN: CPT | Performed by: PSYCHIATRY & NEUROLOGY

## 2018-04-12 RX ORDER — LEVOTHYROXINE SODIUM 0.2 MG/1
TABLET ORAL
COMMUNITY
Start: 2018-01-31 | End: 2020-11-30

## 2018-04-12 NOTE — PROGRESS NOTES
Subjective   Nuris Ribeiro is a 67 y.o. female.     Chief Complaint   Patient presents with   • Simple partial seizure disorder without intractable epilepsy       History of Present Illness   66 yo RH woman seen 10/17 for f/u from [facility] stay after admitted 9/20/17 for episodes of difficulty speaking. One morning trying to tell  she wanted peanut butter, and was unable to do so. Drove to a doctor's appt, where also unable to talk normally. Lasted hours. Words came out, but not the right ones.   Denied any abnormal motor movements of the extremities, no visual changes, no somatosensory issues.  CT and MRI showed left subdural hematoma, and routine EEG was concerning for potentially ictal discharges in the left hemisphere (continuous left hemispheric slowing, mild intermittent moderate left frontal temporal slowing, and at times, synchronous intermittent bilateral frontal temporal slowing, brief, potentially ictal, rhythmic discharges (BIRDs), maximum left frontotemporal.  She was placed on Keppra 500 mg twice a day.  Has not recurred. No side effects at all on LVT.  No previous h/o seizures, no family h/o seizures.   Today: has done well, no suspicion of seizures -- no language/speaking difficulty, abnormal mvmt or sensation. No LOC or alteration of consciousness. No headaches or weakness.  Has Charcot foot, boot on left foot for almost 6 mos, looking forward to walking finally after her bariatric surgery. On LVT 500mg bid, no SEs, but ready to go off.    The following portions of the patient's history were reviewed and updated as appropriate: allergies, current medications, past family history, past medical history, past social history, past surgical history and problem list.    Allergies   Allergen Reactions   • Amoxicillin Rash     can tolerate Rocephin       Current Outpatient Prescriptions on File Prior to Visit   Medication Sig Dispense Refill   • allopurinol (ZYLOPRIM) 100 MG tablet Take 100 mg by  "mouth Daily.     • aspirin 81 MG tablet Take 81 mg by mouth Daily.     • cholecalciferol (VITAMIN D3) 1000 UNITS tablet Take 1,000 Units by mouth Daily.     • clopidogrel (PLAVIX) 75 MG tablet      • hydrochlorothiazide (HYDRODIURIL) 12.5 MG tablet      • insulin detemir (LEVEMIR) 100 UNIT/ML injection Inject 5 Units under the skin Every Night.  12   • insulin lispro (humaLOG) 100 UNIT/ML injection Inject 3 Units under the skin 3 (Three) Times a Day Before Meals.  12   • levETIRAcetam (KEPPRA) 500 MG tablet Take 1 tablet by mouth Every 12 (Twelve) Hours. 60 tablet 6   • lisinopril (PRINIVIL,ZESTRIL) 2.5 MG tablet      • Multiple Vitamin (MULTI VITAMIN PO) Take 1 tablet by mouth Daily.     • simvastatin (ZOCOR) 40 MG tablet Take 40 mg by mouth Every Night.     • [DISCONTINUED] levothyroxine (SYNTHROID, LEVOTHROID) 112 MCG tablet Take 224 mcg by mouth Daily. Takes two daily       No current facility-administered medications on file prior to visit.        Past Medical History:   Diagnosis Date   • Abnormal CXR     worsening chronic changes, see report   • CKD (chronic kidney disease), stage III     BUN/Cr 21/1.40 gfr 38  followed by nephrology   • Coronary artery disease     s/p stenting 2008, on ASA/Plavix, followed by Dr. Buck   • Diabetes mellitus     Dx 30+ yrs ago, thinks insulin last 15+ years.  \"I have not been a good diabetic\" - says didn't check her sugars for years.  A1c >9   • Diabetic peripheral neuropathy    • Diastasis of rectus abdominis     massive   • Dyslipidemia    • Dyspepsia     rare. no QUEENIE sx's or RUQ pains.  serum h. pyl neg   • Dyspnea on exertion    • Fatigue    • Gangrene     (R) 5th toe amputated   • Gas gangrene    • Gout     recently started on allopurinol but doesn't know why   • History of DVT (deep vein thrombosis)     years ago, etiology unclear, tx w/ Warfarin   • Hypertension    • Hypothyroidism    • Leukocytosis     13.98, asx   • Morbid obesity    • Peripheral edema    • PVD " "(peripheral vascular disease)     w/ cellulitis of RLE 1/2017 tx w/ Vanc/Rocephin   • Venous stasis dermatitis     R>L, with phelbitis s/p Rocephin/Flagyl 1/17   • Ventral hernia     periumbilical assoc w large rectus diastasis, chronic incarc   • Vitamin D deficiency    • Wears glasses        Past Surgical History:   Procedure Laterality Date   • COLONOSCOPY  2015   • CORONARY ANGIOPLASTY WITH STENT PLACEMENT  2008   • GASTRIC SLEEVE LAPAROSCOPIC N/A 6/2/2017    Procedure: GASTRIC SLEEVE LAPAROSCOPIC;  Surgeon: Tiago Obregon MD;  Location: [facility];  Service:    • TOE AMPUTATION Right 2015    5th toe, d/t gangrene   • TONSILLECTOMY  1967       Social History     Social History   • Marital status:      Spouse name: N/A   • Number of children: N/A   • Years of education: N/A     Occupational History   • Not on file.     Social History Main Topics   • Smoking status: Never Smoker   • Smokeless tobacco: Never Used   • Alcohol use No   • Drug use: No   • Sexual activity: Defer     Other Topics Concern   • Not on file     Social History Narrative    Patient is  with 2 adult children and lives in [place]. 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        Review of Systems   Constitutional: Negative for fever and unexpected weight change.   Respiratory: Negative for cough and shortness of breath.    Cardiovascular: Negative for chest pain.   Musculoskeletal:        As in hpi       Objective   Blood pressure 116/62, height 172.7 cm (67.99\"), weight 95.7 kg (211 lb).    Physical Exam   Constitutional: She is oriented to person, place, and time. She appears well-developed and well-nourished.   HENT:   Head: Normocephalic and atraumatic.   Eyes: EOM are normal. Pupils are equal, round, and reactive to light.   Pulmonary/Chest: Effort normal.   Musculoskeletal: Normal range of motion.   Boot left foot   Neurological: She is oriented to person, place, and " time. She has a normal Finger-Nose-Finger Test and a normal Heel to Shin Test. Gait normal.   Skin: Skin is warm and dry.   Psychiatric: Her speech is normal.   Nursing note and vitals reviewed.      Neurologic Exam     Mental Status   Oriented to person, place, and time.   Speech: speech is normal   Level of consciousness: alert  Knowledge: consistent with education.   Able to name object. Normal comprehension.     Cranial Nerves     CN II   Visual fields full to confrontation.     CN III, IV, VI   Pupils are equal, round, and reactive to light.  Extraocular motions are normal.     CN VII   Facial expression full, symmetric.     CN IX, X   CN IX normal.   CN X normal.   Palate: symmetric    CN XI   CN XI normal.     CN XII   CN XII normal.     Motor Exam   Muscle bulk: normal  Right arm pronator drift: absent  Left arm pronator drift: absent    Strength   Strength 5/5 except as noted. Limited testing LLE 2/2 boot     Sensory Exam   Light touch normal.     Gait, Coordination, and Reflexes     Gait  Gait: normal (normal given boot)    Coordination   Finger to nose coordination: normal  Heel to shin coordination: normal    Tremor   Resting tremor: absent  Intention tremor: absent  Action tremor: absent      Assessment/Plan     Nuris was seen today for simple partial seizure disorder without intractable epilepsy.    Diagnoses and all orders for this visit:    Simple partial seizure disorder without intractable epilepsy    SDH (subdural hematoma)      Discussion/Summary:  Will plan taper of LVT by 250mg [id number] (written out). Discussed risk of recurrence (not zero), initial etiology of seizure, symptoms that can indicate seizures, appropriate response for any recurrence, minimizing driving while tapering medication.  18 min face to face, 12 min in discussion as above.  Return if symptoms worsen or fail to improve.

## 2018-06-14 ENCOUNTER — OFFICE VISIT (OUTPATIENT)
Dept: BARIATRICS/WEIGHT MGMT | Facility: CLINIC | Age: 68
End: 2018-06-14

## 2018-06-14 VITALS
OXYGEN SATURATION: 99 % | RESPIRATION RATE: 18 BRPM | HEIGHT: 68 IN | BODY MASS INDEX: 32.51 KG/M2 | DIASTOLIC BLOOD PRESSURE: 71 MMHG | WEIGHT: 214.51 LBS | SYSTOLIC BLOOD PRESSURE: 118 MMHG | HEART RATE: 75 BPM | TEMPERATURE: 97.5 F

## 2018-06-14 DIAGNOSIS — Z90.3 POSTGASTRECTOMY MALABSORPTION: ICD-10-CM

## 2018-06-14 DIAGNOSIS — Z13.0 SCREENING, IRON DEFICIENCY ANEMIA: ICD-10-CM

## 2018-06-14 DIAGNOSIS — E66.9 DIABETES MELLITUS TYPE 2 IN OBESE (HCC): ICD-10-CM

## 2018-06-14 DIAGNOSIS — Z13.21 MALNUTRITION SCREEN: ICD-10-CM

## 2018-06-14 DIAGNOSIS — R53.83 FATIGUE, UNSPECIFIED TYPE: Primary | ICD-10-CM

## 2018-06-14 DIAGNOSIS — E78.5 HYPERLIPIDEMIA, UNSPECIFIED HYPERLIPIDEMIA TYPE: ICD-10-CM

## 2018-06-14 DIAGNOSIS — I10 ESSENTIAL HYPERTENSION: ICD-10-CM

## 2018-06-14 DIAGNOSIS — Z98.84 STATUS POST BARIATRIC SURGERY: ICD-10-CM

## 2018-06-14 DIAGNOSIS — E55.9 HYPOVITAMINOSIS D: ICD-10-CM

## 2018-06-14 DIAGNOSIS — K91.2 POSTGASTRECTOMY MALABSORPTION: ICD-10-CM

## 2018-06-14 DIAGNOSIS — E11.69 DIABETES MELLITUS TYPE 2 IN OBESE (HCC): ICD-10-CM

## 2018-06-14 PROCEDURE — 99214 OFFICE O/P EST MOD 30 MIN: CPT | Performed by: SURGERY

## 2018-06-14 RX ORDER — CEPHALEXIN 500 MG/1
CAPSULE ORAL
COMMUNITY
Start: 2018-06-13 | End: 2020-12-16 | Stop reason: ALTCHOICE

## 2018-06-14 RX ORDER — HYDROCODONE BITARTRATE AND ACETAMINOPHEN 5; 325 MG/1; MG/1
TABLET ORAL
COMMUNITY
Start: 2018-06-13 | End: 2020-12-16 | Stop reason: ALTCHOICE

## 2018-06-14 NOTE — PROGRESS NOTES
"[facility] Bariatric Surgery  [address]  [facility] [id number] [phone]        Patient Name:  Nuris Ribeiro.  :  1950      Date of Visit: 2018      Reason for Visit:   1 years postop      HPI: Nuris Ribeiro is a 67 y.o. female s/p LSG by GDW on 17    Doing well.  No issues/concerns. Denies dysphagia, reflux, nausea, vomiting and abdominal pain.  Getting 80g prot/day.  Drinking ?? fluid oz/day.  1 month labs revealed dehydration/borderline low B12.  Taking 2000 mcg B12, calcium 600 mg, D3 2000 IU, biotin 20,000, MVI.   Exercise: finally got rid of boot for Charcot foot.  Walking a little.       Reduced BP meds and insulin since surgery.      Presurgery weight: 290 pounds.  Today's weight is 97.3 kg (214 lb 8.2 oz) pounds, today's  Body mass index is 32.62 kg/m²., and her weight loss since surgery is 76 pounds.      Drink no calorie-containing drinks.  Sometimes has fast food, but orders a salad or chicken nuggets.  Has stopped getting regular fries, but now gets sweet potato fries.  Snacks a lot between meals.  Snacks: veggie straws, nuts, \"bar mix\" pretzels/nuts.  Does not count portions.  Occasional sweets.    Breakfast: shake ([employer]) + cheese  Lunch: peanut butter and crackers (homemade), tangerine, 90 calorie snack bar, maybe leftover (loaded potato soup)  Dinner: goes out ot eat on weekends, uses leftovers for supper usually.  Chicken strips, sweet potato fries, or fish  Snacks: as above    Past Medical History:   Diagnosis Date   • Abnormal CXR     worsening chronic changes, see report   • CKD (chronic kidney disease), stage III     BUN/Cr .40 gfr 38  followed by nephrology   • Coronary artery disease     s/p stenting , on ASA/Plavix, followed by Dr. Buck   • Diabetes mellitus     Dx 30+ yrs ago, thinks insulin last 15+ years.  \"I have not been a good diabetic\" - says didn't check her sugars for years.  A1c >9   • Diabetic " peripheral neuropathy    • Diastasis of rectus abdominis     massive   • Dyslipidemia    • Dyspepsia     rare. no QUEENIE sx's or RUQ pains.  serum h. pyl neg   • Dyspnea on exertion    • Fatigue    • Gangrene     (R) 5th toe amputated   • Gas gangrene    • Gout     recently started on allopurinol but doesn't know why   • History of DVT (deep vein thrombosis)     years ago, etiology unclear, tx w/ Warfarin   • Hypertension    • Hypothyroidism    • Leukocytosis     13.98, asx   • Morbid obesity    • Peripheral edema    • PVD (peripheral vascular disease)     w/ cellulitis of RLE 1/2017 tx w/ Vanc/Rocephin   • Venous stasis dermatitis     R>L, with phelbitis s/p Rocephin/Flagyl 1/17   • Ventral hernia     periumbilical assoc w large rectus diastasis, chronic incarc   • Vitamin D deficiency    • Wears glasses      Past Surgical History:   Procedure Laterality Date   • COLONOSCOPY  2015   • CORONARY ANGIOPLASTY WITH STENT PLACEMENT  2008   • GASTRIC SLEEVE LAPAROSCOPIC N/A 6/2/2017    Procedure: GASTRIC SLEEVE LAPAROSCOPIC;  Surgeon: Tiago Obregon MD;  [facility];  Service:    • TOE AMPUTATION Right 2015    5th toe, d/t gangrene   • TONSILLECTOMY  1967     Outpatient Prescriptions Marked as Taking for the 6/14/18 encounter (Office Visit) with Sherrill Rangel MD   Medication Sig Dispense Refill   • allopurinol (ZYLOPRIM) 100 MG tablet Take 100 mg by mouth Daily.     • aspirin 81 MG tablet Take 81 mg by mouth Daily.     • cephalexin (KEFLEX) 500 MG capsule      • cholecalciferol (VITAMIN D3) 1000 UNITS tablet Take 1,000 Units by mouth Daily.     • clopidogrel (PLAVIX) 75 MG tablet      • hydrochlorothiazide (HYDRODIURIL) 12.5 MG tablet      • HYDROcodone-acetaminophen (NORCO) 5-325 MG per tablet      • insulin detemir (LEVEMIR) 100 UNIT/ML injection Inject 5 Units under the skin Every Night.  12   • insulin lispro (humaLOG) 100 UNIT/ML injection Inject 3 Units under the skin 3 (Three) Times a Day Before  "Meals.  12   • levothyroxine (SYNTHROID, LEVOTHROID) 200 MCG tablet      • lisinopril (PRINIVIL,ZESTRIL) 2.5 MG tablet      • Multiple Vitamin (MULTI VITAMIN PO) Take 1 tablet by mouth Daily.     • simvastatin (ZOCOR) 40 MG tablet Take 40 mg by mouth Every Night.         Allergies   Allergen Reactions   • Amoxicillin Rash     can tolerate Rocephin       Social History     Social History   • Marital status:      Spouse name: N/A   • Number of children: N/A   • Years of education: N/A     Occupational History   • Not on file.     Social History Main Topics   • Smoking status: Never Smoker   • Smokeless tobacco: Never Used   • Alcohol use No   • Drug use: No   • Sexual activity: Defer     Other Topics Concern   • Not on file     Social History Narrative    Patient is  with 2 adult children and lives in [place]. 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        /71 (BP Location: Left arm, Patient Position: Sitting, Cuff Size: Large Adult)   Pulse 75   Temp 97.5 °F (36.4 °C) (Temporal Artery )   Resp 18   Ht 172.7 cm (68\")   Wt 97.3 kg (214 lb 8.2 oz)   SpO2 99%   BMI 32.62 kg/m²     Physical Exam   Constitutional: She is oriented to person, place, and time. She appears well-developed and well-nourished. No distress.   HENT:   Head: Normocephalic and atraumatic.   Mouth/Throat: No oropharyngeal exudate.   Eyes: Conjunctivae and EOM are normal. Pupils are equal, round, and reactive to light.   Pulmonary/Chest: Effort normal. No respiratory distress.   Abdominal: Soft. She exhibits no distension. There is no tenderness.   Neurological: She is alert and oriented to person, place, and time. No cranial nerve deficit.   Skin: Skin is warm and dry. No rash noted. She is not diaphoretic. No erythema.   Psychiatric: She has a normal mood and affect. Her behavior is normal. Judgment and thought content normal.         Assessment:  1 years s/p LSG by " GDW on 6/2/17    ICD-10-CM ICD-9-CM   1. Fatigue, unspecified type R53.83 780.79   2. Hypovitaminosis D E55.9 268.9   3. Postgastrectomy malabsorption K91.2 579.3    Z90.3    4. Screening, iron deficiency anemia Z13.0 V78.0   5. Malnutrition screen Z13.21 V77.2   6. Status post bariatric surgery Z98.84 V45.86   7. Essential hypertension I10 401.9   8. Diabetes mellitus type 2 in obese E11.69 250.00    E66.9 278.00   9. Hyperlipidemia, unspecified hyperlipidemia type E78.5 272.4         Plan:  Doing well. Continue w/ good food choices and healthy habits.  Strive for protein 100g/day.  Shoot for 1500 calories per day.  Start routine exercise!  Discussed 30 minutes 3-5 days per week cardio + weight training.  Routine bariatric labs ordered.  Continue vitamins w/ adjustments pending lab results.  Call w/ problems/concerns.     The patient was instructed to follow up in 6 months, sooner if needed.    note: approx 15 of the 25 minute visit was spent counseling on nutrition and necessary dietary/lifestyle modifications.    Sherrill Rangel MD

## 2018-07-12 ENCOUNTER — TELEPHONE (OUTPATIENT)
Dept: BARIATRICS/WEIGHT MGMT | Facility: CLINIC | Age: 68
End: 2018-07-12

## 2018-07-12 NOTE — TELEPHONE ENCOUNTER
Pt called in wanting to know exactly why she was put on B12 injections?  Please advise, thank you.     Farzaneh I sent this to you since Dr. Rangel is out of the office.  Thank you.

## 2018-07-13 NOTE — TELEPHONE ENCOUNTER
Notified pt that mma was high which correlates to B12 being low, and that Dr. Rangel wanted to boost her B12. Thanks.

## 2018-07-30 LAB
25(OH)D3+25(OH)D2 SERPL-MCNC: 38.6 NG/ML
A-TOCOPHEROL VIT E SERPL-MCNC: 10.5 MG/L (ref 9–29)
ALBUMIN SERPL-MCNC: 3.88 G/DL (ref 3.2–4.8)
ALBUMIN/GLOB SERPL: 1.3 G/DL (ref 1.5–2.5)
ALP SERPL-CCNC: 96 U/L (ref 25–100)
ALT SERPL-CCNC: 27 U/L (ref 7–40)
AST SERPL-CCNC: 24 U/L (ref 0–33)
BASOPHILS # BLD AUTO: 0.03 10*3/MM3 (ref 0–0.2)
BASOPHILS NFR BLD AUTO: 0.2 % (ref 0–1)
BILIRUB SERPL-MCNC: 0.7 MG/DL (ref 0.3–1.2)
BUN SERPL-MCNC: 33 MG/DL (ref 9–23)
BUN/CREAT SERPL: 28 (ref 7–25)
CALCIUM SERPL-MCNC: 9.9 MG/DL (ref 8.7–10.4)
CHLORIDE SERPL-SCNC: 102 MMOL/L (ref 99–109)
CO2 SERPL-SCNC: 29 MMOL/L (ref 20–31)
CREAT SERPL-MCNC: 1.18 MG/DL (ref 0.6–1.3)
EOSINOPHIL # BLD AUTO: 0.31 10*3/MM3 (ref 0–0.3)
EOSINOPHIL NFR BLD AUTO: 2.3 % (ref 0–3)
ERYTHROCYTE [DISTWIDTH] IN BLOOD BY AUTOMATED COUNT: 13.1 % (ref 11.3–14.5)
FERRITIN SERPL-MCNC: 69 NG/ML (ref 10–291)
FOLATE SERPL-MCNC: >24 NG/ML (ref 3.2–20)
GAMMA TOCOPHEROL SERPL-MCNC: 1.3 MG/L (ref 0.5–4.9)
GLOBULIN SER CALC-MCNC: 3 GM/DL
GLUCOSE SERPL-MCNC: 174 MG/DL (ref 70–100)
HCT VFR BLD AUTO: 47.8 % (ref 34.5–44)
HGB BLD-MCNC: 16 G/DL (ref 11.5–15.5)
IMM GRANULOCYTES # BLD: 0.04 10*3/MM3 (ref 0–0.03)
IMM GRANULOCYTES NFR BLD: 0.3 % (ref 0–0.6)
IRON SERPL-MCNC: 79 MCG/DL (ref 50–175)
LYMPHOCYTES # BLD AUTO: 2.26 10*3/MM3 (ref 0.6–4.8)
LYMPHOCYTES NFR BLD AUTO: 16.9 % (ref 24–44)
Lab: NORMAL
MAGNESIUM SERPL-MCNC: 1.8 MG/DL (ref 1.3–2.7)
MCH RBC QN AUTO: 30.1 PG (ref 27–31)
MCHC RBC AUTO-ENTMCNC: 33.5 G/DL (ref 32–36)
MCV RBC AUTO: 89.8 FL (ref 80–99)
METHYLMALONATE SERPL-SCNC: 317 NMOL/L (ref 0–378)
MONOCYTES # BLD AUTO: 0.65 10*3/MM3 (ref 0–1)
MONOCYTES NFR BLD AUTO: 4.9 % (ref 0–12)
NEUTROPHILS # BLD AUTO: 10.11 10*3/MM3 (ref 1.5–8.3)
NEUTROPHILS NFR BLD AUTO: 75.4 % (ref 41–71)
PHOSPHATE SERPL-MCNC: 4.6 MG/DL (ref 2.4–5.1)
PLATELET # BLD AUTO: 376 10*3/MM3 (ref 150–450)
POTASSIUM SERPL-SCNC: 4.9 MMOL/L (ref 3.5–5.5)
PREALB SERPL-MCNC: 20 MG/DL (ref 10–36)
PROT SERPL-MCNC: 6.9 G/DL (ref 5.7–8.2)
PTH-INTACT SERPL-MCNC: 14 PG/ML (ref 15–65)
RBC # BLD AUTO: 5.32 10*6/MM3 (ref 3.89–5.14)
SODIUM SERPL-SCNC: 138 MMOL/L (ref 132–146)
VIT A SERPL-MCNC: 47.9 UG/DL (ref 36.4–108)
VIT B1 BLD-SCNC: 179.8 NMOL/L (ref 66.5–200)
WBC # BLD AUTO: 13.4 10*3/MM3 (ref 3.5–10.8)
ZINC SERPL-MCNC: 77 UG/DL (ref 56–134)

## 2018-10-18 ENCOUNTER — LAB (OUTPATIENT)
Dept: LAB | Facility: HOSPITAL | Age: 68
End: 2018-10-18

## 2018-10-18 ENCOUNTER — TRANSCRIBE ORDERS (OUTPATIENT)
Dept: LAB | Facility: HOSPITAL | Age: 68
End: 2018-10-18

## 2018-10-18 DIAGNOSIS — N18.9 CHRONIC KIDNEY DISEASE, UNSPECIFIED CKD STAGE: Primary | ICD-10-CM

## 2018-10-18 DIAGNOSIS — N18.9 CHRONIC KIDNEY DISEASE, UNSPECIFIED CKD STAGE: ICD-10-CM

## 2018-10-18 DIAGNOSIS — E11.9 DIABETES MELLITUS WITH NO COMPLICATION (HCC): ICD-10-CM

## 2018-10-18 LAB
ALBUMIN SERPL-MCNC: 3.93 G/DL (ref 3.2–4.8)
ANION GAP SERPL CALCULATED.3IONS-SCNC: 5 MMOL/L (ref 3–11)
ARTICHOKE IGE QN: 66 MG/DL (ref 0–130)
BACTERIA UR QL AUTO: ABNORMAL /HPF
BILIRUB UR QL STRIP: NEGATIVE
BUN BLD-MCNC: 26 MG/DL (ref 9–23)
BUN/CREAT SERPL: 22.6 (ref 7–25)
CALCIUM SPEC-SCNC: 9.6 MG/DL (ref 8.7–10.4)
CHLORIDE SERPL-SCNC: 100 MMOL/L (ref 99–109)
CHOLEST SERPL-MCNC: 116 MG/DL (ref 0–200)
CLARITY UR: CLEAR
CO2 SERPL-SCNC: 30 MMOL/L (ref 20–31)
COLOR UR: YELLOW
CREAT BLD-MCNC: 1.15 MG/DL (ref 0.6–1.3)
CREAT UR-MCNC: 114 MG/DL
GFR SERPL CREATININE-BSD FRML MDRD: 47 ML/MIN/1.73
GLUCOSE BLD-MCNC: 202 MG/DL (ref 70–100)
GLUCOSE UR STRIP-MCNC: ABNORMAL MG/DL
HDLC SERPL-MCNC: 39 MG/DL (ref 40–60)
HGB UR QL STRIP.AUTO: NEGATIVE
HYALINE CASTS UR QL AUTO: ABNORMAL /LPF
KETONES UR QL STRIP: NEGATIVE
LEUKOCYTE ESTERASE UR QL STRIP.AUTO: NEGATIVE
NITRITE UR QL STRIP: NEGATIVE
PH UR STRIP.AUTO: 5.5 [PH] (ref 5–8)
PHOSPHATE SERPL-MCNC: 3.9 MG/DL (ref 2.4–5.1)
POTASSIUM BLD-SCNC: 4.5 MMOL/L (ref 3.5–5.5)
PROT UR QL STRIP: NEGATIVE
PTH-INTACT SERPL-MCNC: 44.5 PG/ML (ref 11–80)
RBC # UR: ABNORMAL /HPF
REF LAB TEST METHOD: ABNORMAL
SODIUM BLD-SCNC: 135 MMOL/L (ref 132–146)
SP GR UR STRIP: 1.02 (ref 1–1.03)
SQUAMOUS #/AREA URNS HPF: ABNORMAL /HPF
TRIGL SERPL-MCNC: 104 MG/DL (ref 0–150)
UROBILINOGEN UR QL STRIP: ABNORMAL
WBC UR QL AUTO: ABNORMAL /HPF

## 2018-10-18 PROCEDURE — 83970 ASSAY OF PARATHORMONE: CPT

## 2018-10-18 PROCEDURE — 82570 ASSAY OF URINE CREATININE: CPT

## 2018-10-18 PROCEDURE — 81001 URINALYSIS AUTO W/SCOPE: CPT | Performed by: INTERNAL MEDICINE

## 2018-10-18 PROCEDURE — 36415 COLL VENOUS BLD VENIPUNCTURE: CPT

## 2018-10-18 PROCEDURE — 80069 RENAL FUNCTION PANEL: CPT

## 2018-10-18 PROCEDURE — 80061 LIPID PANEL: CPT

## 2018-10-18 PROCEDURE — 82043 UR ALBUMIN QUANTITATIVE: CPT

## 2018-10-19 LAB — MICROALBUMIN UR-MCNC: 3.8 UG/ML

## 2020-11-02 ENCOUNTER — APPOINTMENT (OUTPATIENT)
Dept: PREADMISSION TESTING | Facility: HOSPITAL | Age: 70
End: 2020-11-02

## 2020-11-02 PROCEDURE — U0004 COV-19 TEST NON-CDC HGH THRU: HCPCS

## 2020-11-02 PROCEDURE — C9803 HOPD COVID-19 SPEC COLLECT: HCPCS

## 2020-11-03 LAB — SARS-COV-2 RNA RESP QL NAA+PROBE: NOT DETECTED

## 2020-11-30 RX ORDER — LEVOTHYROXINE SODIUM 0.2 MG/1
TABLET ORAL
Qty: 90 TABLET | Refills: 1 | Status: SHIPPED | OUTPATIENT
Start: 2020-11-30 | End: 2021-04-08 | Stop reason: DRUGHIGH

## 2020-12-16 ENCOUNTER — LAB (OUTPATIENT)
Dept: LAB | Facility: HOSPITAL | Age: 70
End: 2020-12-16

## 2020-12-16 ENCOUNTER — OFFICE VISIT (OUTPATIENT)
Dept: ENDOCRINOLOGY | Facility: CLINIC | Age: 70
End: 2020-12-16

## 2020-12-16 VITALS
BODY MASS INDEX: 37.8 KG/M2 | WEIGHT: 249.4 LBS | HEART RATE: 70 BPM | TEMPERATURE: 97.3 F | SYSTOLIC BLOOD PRESSURE: 134 MMHG | HEIGHT: 68 IN | OXYGEN SATURATION: 96 % | DIASTOLIC BLOOD PRESSURE: 70 MMHG

## 2020-12-16 DIAGNOSIS — E03.9 ACQUIRED HYPOTHYROIDISM: ICD-10-CM

## 2020-12-16 DIAGNOSIS — I25.10 CORONARY ARTERY DISEASE INVOLVING NATIVE CORONARY ARTERY OF NATIVE HEART WITHOUT ANGINA PECTORIS: ICD-10-CM

## 2020-12-16 DIAGNOSIS — Z79.4 TYPE 2 DIABETES MELLITUS WITH MILD NONPROLIFERATIVE RETINOPATHY WITHOUT MACULAR EDEMA, WITH LONG-TERM CURRENT USE OF INSULIN, UNSPECIFIED LATERALITY (HCC): ICD-10-CM

## 2020-12-16 DIAGNOSIS — E11.42 DIABETIC PERIPHERAL NEUROPATHY (HCC): ICD-10-CM

## 2020-12-16 DIAGNOSIS — E11.65 UNCONTROLLED TYPE 2 DIABETES MELLITUS WITH HYPERGLYCEMIA (HCC): Primary | ICD-10-CM

## 2020-12-16 DIAGNOSIS — I10 ESSENTIAL HYPERTENSION: ICD-10-CM

## 2020-12-16 DIAGNOSIS — E11.3299 TYPE 2 DIABETES MELLITUS WITH MILD NONPROLIFERATIVE RETINOPATHY WITHOUT MACULAR EDEMA, WITH LONG-TERM CURRENT USE OF INSULIN, UNSPECIFIED LATERALITY (HCC): ICD-10-CM

## 2020-12-16 PROBLEM — E78.2 MIXED HYPERLIPIDEMIA: Status: ACTIVE | Noted: 2020-12-16

## 2020-12-16 LAB
EXPIRATION DATE: NORMAL
HBA1C MFR BLD: 10 %
Lab: NORMAL
TSH SERPL DL<=0.05 MIU/L-ACNC: 0.29 UIU/ML (ref 0.27–4.2)

## 2020-12-16 PROCEDURE — 99214 OFFICE O/P EST MOD 30 MIN: CPT | Performed by: INTERNAL MEDICINE

## 2020-12-16 PROCEDURE — 84443 ASSAY THYROID STIM HORMONE: CPT

## 2020-12-16 PROCEDURE — 83036 HEMOGLOBIN GLYCOSYLATED A1C: CPT | Performed by: INTERNAL MEDICINE

## 2020-12-16 RX ORDER — EZETIMIBE 10 MG/1
10 TABLET ORAL DAILY
COMMUNITY
Start: 2020-09-08

## 2020-12-16 RX ORDER — PREDNISOLONE ACETATE 10 MG/ML
SUSPENSION/ DROPS OPHTHALMIC
COMMUNITY
Start: 2020-12-09 | End: 2021-10-28

## 2020-12-16 RX ORDER — ROSUVASTATIN CALCIUM 40 MG/1
40 TABLET, COATED ORAL NIGHTLY
COMMUNITY

## 2020-12-16 RX ORDER — BLOOD SUGAR DIAGNOSTIC
STRIP MISCELLANEOUS
COMMUNITY
Start: 2020-10-16 | End: 2021-06-28 | Stop reason: SDUPTHER

## 2020-12-16 RX ORDER — EMPAGLIFLOZIN 25 MG/1
25 TABLET, FILM COATED ORAL DAILY
Qty: 30 TABLET | Refills: 5 | Status: SHIPPED | OUTPATIENT
Start: 2020-12-16 | End: 2021-04-06 | Stop reason: SDUPTHER

## 2020-12-16 NOTE — PROGRESS NOTES
"     Office Note      Date: 2020  Patient Name: Nuris Ribeiro  MRN: [id number]  : 1950    Chief Complaint   Patient presents with   • Diabetes       History of Present Illness:   Nuris Ribeiro is a 70 y.o. female who presents for Diabetes type 2. Diagnosed in: . Treated in past with oral agents. Current treatments: N and R insulin. Number of insulin shots per day: >4. Checks blood sugar 3 times a day. Has low blood sugar: rare. Aspirin use: Yes. Statin use: Yes. ACE-I/ARB use: Yes. Changes in health since last visit: vitreous hemorrhage. Last eye exam 10/2020.    She is taking her levothyroxine 200mcg regularly and correctly.  She denies any sxs of hypo- or hyperthyroidism at this time.    Subjective      Diabetic Complications:  Eyes: yes  Kidneys: No  Feet: Yes -    Heart: Yes -      Diet and Exercise:  Meals per day: 3  Minutes of exercise per week: 0 mins.    Review of Systems:   Review of Systems   Constitutional: Negative.    Cardiovascular: Negative.    Gastrointestinal: Negative.    Endocrine: Negative.        The following portions of the patient's history were reviewed and updated as appropriate: allergies, current medications, past family history, past social history, past surgical history and problem list.    Objective       Visit Vitals  /70   Pulse 70   Temp 97.3 °F (36.3 °C) (Infrared)   Ht 172.7 cm (68\")   Wt 113 kg (249 lb 6.4 oz)   SpO2 96%   BMI 37.92 kg/m²       Physical Exam:  Physical Exam  Constitutional:       Appearance: Normal appearance.   Neurological:      Mental Status: She is alert.         Labs:    HbA1c  Lab Results   Component Value Date    HGBA1C 10.0 2020       CMP  Lab Results   Component Value Date    GLUCOSE 202 (H) 10/18/2018    BUN 26 (H) 10/18/2018    CREATININE 1.15 10/18/2018    EGFRIFNONA 47 (L) 10/18/2018    EGFRIFAFRI 56 (L) 2018    BCR 22.6 10/18/2018    K 4.5 10/18/2018    CO2 30.0 10/18/2018    CALCIUM 9.6 10/18/2018    " PROTENTOTREF 6.9 06/14/2018    LABIL2 1.3 (L) 06/14/2018    AST 24 06/14/2018    ALT 27 06/14/2018        Lipid Panel  Lab Results   Component Value Date    CHLPL 133 03/02/2017    HDL 39 (L) 10/18/2018    LDL 66 10/18/2018    TRIG 104 10/18/2018        TSH  Lab Results   Component Value Date    TSH 0.385 09/13/2017        Hemoglobin A1C  Lab Results   Component Value Date    HGBA1C 10.0 12/16/2020        Microalbumin/Creatinine  Lab Results   Component Value Date    CREATINIURIN 154.6 12/10/2015    MICROALBUR 3.8 10/18/2018           Assessment / Plan      Assessment & Plan:  Problem List Items Addressed This Visit        Cardiovascular and Mediastinum    Coronary artery disease    Overview     s/p stenting 2008, on ASA/Plavix, followed by Dr. Buck         Current Assessment & Plan     Add SGLT-2 inhibitor.         Relevant Medications    clopidogrel (PLAVIX) 75 MG tablet    Hypertension    Current Assessment & Plan     Hypertension is unchanged.  Continue current treatment regimen.  Blood pressure will be reassessed in 3 months.         Relevant Medications    hydrochlorothiazide (HYDRODIURIL) 12.5 MG tablet    lisinopril (PRINIVIL,ZESTRIL) 2.5 MG tablet       Endocrine    Hypothyroidism    Current Assessment & Plan     Check TSH today.         Relevant Medications    levothyroxine (SYNTHROID, LEVOTHROID) 200 MCG tablet    Other Relevant Orders    TSH    Diabetic peripheral neuropathy (CMS/HCC)    Relevant Medications    insulin NPH (humuLIN N,novoLIN N) 100 UNIT/ML injection    Empagliflozin (Jardiance) 25 MG tablet    Uncontrolled type 2 diabetes mellitus with hyperglycemia (CMS/HCC) - Primary    Current Assessment & Plan     Diabetes is worsening.   Continue current treatment regimen. And add another agent.  Diabetes will be reassessed in 3 months.    At the last visit we prescribed ozempic.  It was going to be too expensive so she didn't fill the Rx.  Given her heart disease, I would like to try SGLT-2  inhibitor.         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    Frankie Watters MD   12/16/2020

## 2020-12-16 NOTE — ASSESSMENT & PLAN NOTE
Diabetes is worsening.   Continue current treatment regimen. And add another agent.  Diabetes will be reassessed in 3 months.    At the last visit we prescribed ozempic.  It was going to be too expensive so she didn't fill the Rx.  Given her heart disease, I would like to try SGLT-2 inhibitor.

## 2021-04-06 ENCOUNTER — LAB (OUTPATIENT)
Dept: LAB | Facility: HOSPITAL | Age: 71
End: 2021-04-06

## 2021-04-06 ENCOUNTER — OFFICE VISIT (OUTPATIENT)
Dept: ENDOCRINOLOGY | Facility: CLINIC | Age: 71
End: 2021-04-06

## 2021-04-06 VITALS
SYSTOLIC BLOOD PRESSURE: 128 MMHG | OXYGEN SATURATION: 97 % | TEMPERATURE: 97.3 F | WEIGHT: 244 LBS | BODY MASS INDEX: 36.98 KG/M2 | HEART RATE: 80 BPM | HEIGHT: 68 IN | DIASTOLIC BLOOD PRESSURE: 68 MMHG

## 2021-04-06 DIAGNOSIS — I10 ESSENTIAL HYPERTENSION: ICD-10-CM

## 2021-04-06 DIAGNOSIS — E03.9 ACQUIRED HYPOTHYROIDISM: ICD-10-CM

## 2021-04-06 DIAGNOSIS — E11.65 UNCONTROLLED TYPE 2 DIABETES MELLITUS WITH HYPERGLYCEMIA (HCC): Primary | ICD-10-CM

## 2021-04-06 DIAGNOSIS — E11.42 DIABETIC PERIPHERAL NEUROPATHY (HCC): ICD-10-CM

## 2021-04-06 LAB
EXPIRATION DATE: NORMAL
HBA1C MFR BLD: 10.9 %
Lab: NORMAL
T4 FREE SERPL-MCNC: 2.3 NG/DL (ref 0.93–1.7)
TSH SERPL DL<=0.05 MIU/L-ACNC: 0.58 UIU/ML (ref 0.27–4.2)

## 2021-04-06 PROCEDURE — 84439 ASSAY OF FREE THYROXINE: CPT

## 2021-04-06 PROCEDURE — 84443 ASSAY THYROID STIM HORMONE: CPT

## 2021-04-06 PROCEDURE — 99214 OFFICE O/P EST MOD 30 MIN: CPT | Performed by: PHYSICIAN ASSISTANT

## 2021-04-06 PROCEDURE — 83036 HEMOGLOBIN GLYCOSYLATED A1C: CPT | Performed by: PHYSICIAN ASSISTANT

## 2021-04-06 RX ORDER — EMPAGLIFLOZIN 25 MG/1
25 TABLET, FILM COATED ORAL DAILY
Qty: 30 TABLET | Refills: 5 | Status: SHIPPED | OUTPATIENT
Start: 2021-04-06 | End: 2021-06-22 | Stop reason: SDUPTHER

## 2021-04-06 NOTE — PROGRESS NOTES
Office Note      Date: 2021  Patient Name: Nuris Ribeiro  MRN: [id number]  : 1950    Chief Complaint   Patient presents with   • Diabetes       History of Present Illness:   Nuris Ribeiro is a 70 y.o. female who presents for type 2 diabetes.  She reports she is no longer taking the Jardiance as this was too expensive she thinks she was in the donut hole when it was prescribed.  She reports she felt this was working well to improve her blood sugars but she could not afford the cost.  She remains on Novolin N 52 units in the morning and 50 units in the evening and Novolin R 12 units with meals plus her sliding scale for up to a total of 20 units with meals based on blood sugar.  She is checking her blood sugar about 3 times a day most days most of these blood sugars recently have been in the high 100s to the 200s she occasionally has a reading in 300 to 400 mg/dL range.  She reports she has not been good with her diet recently eating more candy often snacks when she is at work.  She reports she has had a few low blood sugars these are typically on the weekends when she is not snacking as much she has had blood sugars in the 60s to 70s.  She had labs done in February with her primary care physician.  Her TSH was slightly low.  She remains on levothyroxine 200 mcg and is taking this regularly and correctly.  She has noted some increased anxiety recently and trouble sleeping she thought this was because her son has moved in with them with all his animals.  She is up-to-date on her eye exam she was recently told she had has the beginning of macular degeneration.  She had her flu and both doses of her Covid vaccine.  She denies any trouble with her feet today she is wearing a brace on her left foot for stability and continues to see the podiatrist regularly.  Dr. Watters did a foot exam in September.      Subjective     Review of Systems:   Review of Systems   Constitutional: Negative.   "  Cardiovascular: Negative.    Gastrointestinal: Negative.    Endocrine: Negative.    Neurological: Positive for numbness.   Psychiatric/Behavioral: Positive for sleep disturbance.       The following portions of the patient's history were reviewed and updated as appropriate: allergies, current medications, past family history, past medical history, past social history, past surgical history and problem list.    Objective     Vitals:    04/06/21 0822   BP: 128/68   BP Location: Left arm   Patient Position: Sitting   Cuff Size: Adult   Pulse: 80   Temp: 97.3 °F (36.3 °C)   TempSrc: Infrared   SpO2: 97%   Weight: 111 kg (244 lb)   Height: 172.7 cm (68\")   PainSc: 0-No pain     Body mass index is 37.1 kg/m².    Physical Exam    HEMOGLOBIN A1C  Lab Results   Component Value Date    HGBA1C 10.9 04/06/2021           Assessment / Plan      Assessment & Plan:  1. Uncontrolled type 2 diabetes mellitus with hyperglycemia (CMS/HCC)  A1c too high at 10.9%.  This is above goal and higher than it was in December.  We will try adding back the Jardiance 25 mg daily to see if this is covered now that she is no longer in the donut hole.  I sampled this medication and advised her to contact the office if it is too expensive so we can adjust her insulin.  Encouraged her to send in blood sugars for review and adjustment as needed.  Pressure okay today.  Weight is down 5 pounds since December.  Encouraged healthy eating habits and physical activity as tolerated.  - POC Glycosylated Hemoglobin (Hb A1C)    2. Diabetic peripheral neuropathy (CMS/HCC)  A1c well above goal.  Discussed the importance of improving diabetes control.  Denies any trouble with her feet today and she sees the podiatrist regularly.  3. Acquired hypothyroidism  TSH was low in February and she has noticed increased anxiety and some trouble sleeping.  Check TSH and free T4 today.  Will send note with results and plan.  We will continue levothyroxine 200 mcg daily for " now  - T4, Free; Future  - TSH; Future    4. Essential hypertension  Blood pressure okay today.      Return in about 3 months (around 7/6/2021) for Recheck.     Janay Jefferson PA-C  04/06/2021

## 2021-04-08 RX ORDER — LEVOTHYROXINE SODIUM 175 UG/1
175 TABLET ORAL DAILY
Qty: 90 TABLET | Refills: 1 | Status: SHIPPED | OUTPATIENT
Start: 2021-04-08 | End: 2021-06-22 | Stop reason: SDUPTHER

## 2021-05-17 RX ORDER — BLOOD-GLUCOSE METER
EACH MISCELLANEOUS
Qty: 1 KIT | Refills: 0 | Status: SHIPPED | OUTPATIENT
Start: 2021-05-17 | End: 2021-05-18 | Stop reason: SDUPTHER

## 2021-05-17 NOTE — TELEPHONE ENCOUNTER
PT CALLED REQUESTING AN RX FOR A KISHA TEST KIT TO BE SENT IN TO [facility].

## 2021-05-18 ENCOUNTER — TELEPHONE (OUTPATIENT)
Dept: ENDOCRINOLOGY | Facility: CLINIC | Age: 71
End: 2021-05-18

## 2021-05-18 RX ORDER — BLOOD-GLUCOSE METER
EACH MISCELLANEOUS
Qty: 1 KIT | Refills: 0 | Status: SHIPPED | OUTPATIENT
Start: 2021-05-18 | End: 2022-04-27

## 2021-06-22 RX ORDER — EMPAGLIFLOZIN 25 MG/1
25 TABLET, FILM COATED ORAL DAILY
Qty: 30 TABLET | Refills: 5 | Status: SHIPPED | OUTPATIENT
Start: 2021-06-22 | End: 2022-01-06

## 2021-06-22 RX ORDER — LEVOTHYROXINE SODIUM 175 UG/1
175 TABLET ORAL DAILY
Qty: 90 TABLET | Refills: 1 | Status: SHIPPED | OUTPATIENT
Start: 2021-06-22 | End: 2021-07-21 | Stop reason: SDUPTHER

## 2021-06-22 NOTE — TELEPHONE ENCOUNTER
Pt is needing a 30 day supply of jardiance and 90 day supply of levothyroxine sent to [facility] mail delivery. She usually gets prescriptions at [facility] but would like these to be sent to [facility] now. Please advise.

## 2021-06-28 RX ORDER — BLOOD SUGAR DIAGNOSTIC
STRIP MISCELLANEOUS
Qty: 300 EACH | Refills: 1 | Status: SHIPPED | OUTPATIENT
Start: 2021-06-28 | End: 2021-06-29 | Stop reason: SDUPTHER

## 2021-06-28 NOTE — TELEPHONE ENCOUNTER
David from [facility] in [facility] called to request a new RX be sent in with Pt's diagnosis codes on it to get approved by insurance for test strips

## 2021-06-29 ENCOUNTER — PATIENT MESSAGE (OUTPATIENT)
Dept: ENDOCRINOLOGY | Facility: CLINIC | Age: 71
End: 2021-06-29

## 2021-06-29 RX ORDER — BLOOD SUGAR DIAGNOSTIC
STRIP MISCELLANEOUS
Qty: 300 EACH | Refills: 1 | Status: SHIPPED | OUTPATIENT
Start: 2021-06-29 | End: 2022-01-14

## 2021-07-01 ENCOUNTER — TELEPHONE (OUTPATIENT)
Dept: ENDOCRINOLOGY | Facility: CLINIC | Age: 71
End: 2021-07-01

## 2021-07-14 NOTE — TELEPHONE ENCOUNTER
PT called requesting rx for insulin syringes. BD insulin syringes, ultra fine needle 6mm. 31 gauge         Pharmacy  [facility]  [phone]

## 2021-07-15 RX ORDER — BLOOD SUGAR DIAGNOSTIC
STRIP MISCELLANEOUS
Qty: 100 EACH | Refills: 2 | Status: SHIPPED | OUTPATIENT
Start: 2021-07-15 | End: 2022-04-27

## 2021-07-20 ENCOUNTER — OFFICE VISIT (OUTPATIENT)
Dept: ENDOCRINOLOGY | Facility: CLINIC | Age: 71
End: 2021-07-20

## 2021-07-20 ENCOUNTER — LAB (OUTPATIENT)
Dept: LAB | Facility: HOSPITAL | Age: 71
End: 2021-07-20

## 2021-07-20 VITALS
BODY MASS INDEX: 36.37 KG/M2 | HEIGHT: 68 IN | DIASTOLIC BLOOD PRESSURE: 66 MMHG | WEIGHT: 240 LBS | SYSTOLIC BLOOD PRESSURE: 128 MMHG | OXYGEN SATURATION: 96 % | HEART RATE: 70 BPM

## 2021-07-20 DIAGNOSIS — E11.65 UNCONTROLLED TYPE 2 DIABETES MELLITUS WITH HYPERGLYCEMIA (HCC): Primary | ICD-10-CM

## 2021-07-20 DIAGNOSIS — E03.9 ACQUIRED HYPOTHYROIDISM: ICD-10-CM

## 2021-07-20 DIAGNOSIS — E11.42 TYPE 2 DIABETES MELLITUS WITH DIABETIC POLYNEUROPATHY, WITH LONG-TERM CURRENT USE OF INSULIN (HCC): ICD-10-CM

## 2021-07-20 DIAGNOSIS — Z79.4 TYPE 2 DIABETES MELLITUS WITH DIABETIC POLYNEUROPATHY, WITH LONG-TERM CURRENT USE OF INSULIN (HCC): ICD-10-CM

## 2021-07-20 DIAGNOSIS — I10 ESSENTIAL HYPERTENSION: ICD-10-CM

## 2021-07-20 LAB
EXPIRATION DATE: NORMAL
EXPIRATION DATE: NORMAL
GLUCOSE BLDC GLUCOMTR-MCNC: 115 MG/DL (ref 70–130)
HBA1C MFR BLD: 8.8 %
Lab: NORMAL
Lab: NORMAL
T4 FREE SERPL-MCNC: 2.01 NG/DL (ref 0.93–1.7)
TSH SERPL DL<=0.05 MIU/L-ACNC: 1.77 UIU/ML (ref 0.27–4.2)

## 2021-07-20 PROCEDURE — 99214 OFFICE O/P EST MOD 30 MIN: CPT | Performed by: PHYSICIAN ASSISTANT

## 2021-07-20 PROCEDURE — 82947 ASSAY GLUCOSE BLOOD QUANT: CPT | Performed by: PHYSICIAN ASSISTANT

## 2021-07-20 PROCEDURE — 83036 HEMOGLOBIN GLYCOSYLATED A1C: CPT | Performed by: PHYSICIAN ASSISTANT

## 2021-07-20 PROCEDURE — 84443 ASSAY THYROID STIM HORMONE: CPT

## 2021-07-20 PROCEDURE — 84439 ASSAY OF FREE THYROXINE: CPT

## 2021-07-20 NOTE — PROGRESS NOTES
Office Note      Date: 2021  Patient Name: Nuris Ribeiro  MRN: [id number]  : 1950    Chief Complaint   Patient presents with   • Diabetes       History of Present Illness:   Nuris Ribeiro is a 70 y.o. female who presents for follow-up for type 2 diabetes.  She reports the Jardiance is $45 every month but she received some inheritance when her mom passed away so she has been able to afford this.  She feels this is helping keep her blood sugars under control.  She remains on Novolin and 52 units in the morning and 50 units in the evening Novolin R 12 units plus a sliding scale (up to 20 units) 3 times a day with meals.  She reports she still has some elevated blood sugars when she cheats or snacks but most of these have been better.  Reports she has had a few low blood sugars in the 70s these are typically before supper.  She has really been working to eat better and limit her snacking.  We reduced her levothyroxine dose to 175 mcg at her appointment in April.  She is taking this regularly.  She feels okay today.  She is up-to-date on her eye exam.  She has had both doses of her Covid vaccine.  She will be due for her foot exam in our office next appointment but she does see the podiatrist very regularly.  She continues to wear the brace on her left foot for stability.  She had fasting labs with her primary care physician in February.      Subjective     Review of Systems:   Review of Systems   Constitutional: Negative.    Cardiovascular: Negative.    Gastrointestinal: Negative.    Endocrine: Negative.    Neurological: Negative.        The following portions of the patient's history were reviewed and updated as appropriate: allergies, current medications, past family history, past medical history, past social history, past surgical history and problem list.    Objective     Vitals:    21 0804   BP: 128/66   BP Location: Left arm   Patient Position: Sitting   Cuff Size: Adult  "  Pulse: 70   SpO2: 96%   Weight: 109 kg (240 lb)   Height: 172.7 cm (68\")   PainSc: 0-No pain     Body mass index is 36.49 kg/m².    Physical Exam  Vitals reviewed.   Constitutional:       General: She is not in acute distress.     Appearance: Normal appearance.   Neurological:      Mental Status: She is alert.         HEMOGLOBIN A1C  Lab Results   Component Value Date    HGBA1C 8.8 07/20/2021       GLUCOSE  Glucose   Date Value Ref Range Status   07/20/2021 115 70 - 130 mg/dL Final           Assessment / Plan      Assessment & Plan:  1. Uncontrolled type 2 diabetes mellitus with hyperglycemia (CMS/HCC)  Her A1c today has much improved at 8.8%.  This is still above goal but much better than where it was in April.  She forgot to bring in her blood sugar readings for review today.  Since she has had a few lower blood sugars before supper we will reduce her Novolin R to 10 units plus a sliding scale with lunch.  She will send in blood sugars for review and adjustment as needed.  Encouraged continued healthy eating habits and limiting snacking.  We will continue the Jardiance 5 mg daily.  I sampled some of this for her today.  She will continue Novolin N 52 units in the morning and 50 units in the evening and Novolin R 12 units plus a sliding scale with breakfast and supper and 10 units plus a sliding scale with lunch.  Blood pressures okay today.  Her blood glucose this morning was 115 mg/dL.  Her weight is down 4 pounds since her appointment in April.  I gave her information on the freestyle chirag as well as the Dexcom continuous glucose monitor.  She will review this data and let me know if she would like me to try to send a prescription.  - POC Glycosylated Hemoglobin (Hb A1C)  - POC Glucose, Blood    2. Type 2 diabetes mellitus with diabetic polyneuropathy, with long-term current use of insulin (CMS/HCC)  She is seeing the podiatrist regularly and her A1c has greatly improved.  Courage her to continue to see the " foot doctor on a regular basis and we will do a foot exam at her appointment next time.    3. Acquired hypothyroidism  TFTs pending.  Will send note with results and plan.  For now she will continue the levothyroxine 175 mcg daily.  I will refill this prescription once labs are reviewed.  - T4, Free; Future  - TSH; Future    4. Essential hypertension  Her blood pressure is good today.  She will continue her current medications.      Return in about 3 months (around 10/20/2021) for Recheck 3-4.     This note was dictated using Dragon voice recognition.    Janay Jefferson PA-C  07/20/2021

## 2021-07-21 RX ORDER — LEVOTHYROXINE SODIUM 175 UG/1
175 TABLET ORAL DAILY
Qty: 90 TABLET | Refills: 1 | Status: SHIPPED | OUTPATIENT
Start: 2021-07-21 | End: 2021-11-18

## 2021-10-28 ENCOUNTER — OFFICE VISIT (OUTPATIENT)
Dept: ENDOCRINOLOGY | Facility: CLINIC | Age: 71
End: 2021-10-28

## 2021-10-28 VITALS
SYSTOLIC BLOOD PRESSURE: 128 MMHG | DIASTOLIC BLOOD PRESSURE: 70 MMHG | HEART RATE: 81 BPM | WEIGHT: 243.8 LBS | BODY MASS INDEX: 36.95 KG/M2 | HEIGHT: 68 IN | OXYGEN SATURATION: 93 %

## 2021-10-28 DIAGNOSIS — Z79.4 TYPE 2 DIABETES MELLITUS WITH DIABETIC POLYNEUROPATHY, WITH LONG-TERM CURRENT USE OF INSULIN (HCC): ICD-10-CM

## 2021-10-28 DIAGNOSIS — I10 PRIMARY HYPERTENSION: ICD-10-CM

## 2021-10-28 DIAGNOSIS — E11.42 TYPE 2 DIABETES MELLITUS WITH DIABETIC POLYNEUROPATHY, WITH LONG-TERM CURRENT USE OF INSULIN (HCC): ICD-10-CM

## 2021-10-28 DIAGNOSIS — E11.65 UNCONTROLLED TYPE 2 DIABETES MELLITUS WITH HYPERGLYCEMIA (HCC): Primary | ICD-10-CM

## 2021-10-28 DIAGNOSIS — E03.9 ACQUIRED HYPOTHYROIDISM: ICD-10-CM

## 2021-10-28 LAB
EXPIRATION DATE: ABNORMAL
EXPIRATION DATE: ABNORMAL
GLUCOSE BLDC GLUCOMTR-MCNC: 210 MG/DL (ref 70–130)
HBA1C MFR BLD: 9.6 %
Lab: ABNORMAL
Lab: ABNORMAL

## 2021-10-28 PROCEDURE — 82947 ASSAY GLUCOSE BLOOD QUANT: CPT | Performed by: PHYSICIAN ASSISTANT

## 2021-10-28 PROCEDURE — 99214 OFFICE O/P EST MOD 30 MIN: CPT | Performed by: PHYSICIAN ASSISTANT

## 2021-10-28 PROCEDURE — 83036 HEMOGLOBIN GLYCOSYLATED A1C: CPT | Performed by: PHYSICIAN ASSISTANT

## 2021-10-28 PROCEDURE — 3046F HEMOGLOBIN A1C LEVEL >9.0%: CPT | Performed by: PHYSICIAN ASSISTANT

## 2021-10-28 RX ORDER — BRIMONIDINE TARTRATE 0.25 MG/ML
1 SOLUTION/ DROPS OPHTHALMIC DAILY
COMMUNITY

## 2021-10-28 NOTE — PROGRESS NOTES
"     Office Note      Date: 10/28/2021  Patient Name: Nuris Ribeiro  MRN: [id number]  : 1950    Chief Complaint   Patient presents with   • Diabetes     3 mo f/u        History of Present Illness:   Nuris Ribeiro is a 71 y.o. female who presents for follow-up for type 2 diabetes.  He is on Jardiance 25 mg daily, Novolin N 52 units in the morning and 50 units in the evening and Novolin R 12 units plus a sliding scale with meals.  She reports her blood sugars have been up and down.  She brought in readings for review today.  She tends to have some lower blood sugar readings 70s and 80s on the weekends when she is not snacking as much.  She reports her blood sugars have been higher recently and she attributes this to Halloween candy.  Reports she is up-to-date on her eye exam.  She had her Covid vaccine and has had her flu vaccine this fall.  She denies any trouble with her feet today.  She is seeing the podiatrist regularly every 2 to 3 months.  At her last visit they remove both her great toenails these took a while to heal but she is doing well.  She reports she had her  plans to retire in  of this year.  Her primary care physician did fasting labs in February.    Subjective     Review of Systems:   Review of Systems   Constitutional: Negative.    Cardiovascular: Negative.    Gastrointestinal: Negative.    Endocrine: Negative.    Neurological: Negative.        The following portions of the patient's history were reviewed and updated as appropriate: allergies, current medications, past family history, past medical history, past social history, past surgical history and problem list.    Objective     Vitals:    10/28/21 0830   BP: 128/70   Pulse: 81   SpO2: 93%   Weight: 111 kg (243 lb 12.8 oz)   Height: 172.7 cm (68\")     Body mass index is 37.07 kg/m².    Physical Exam  Vitals reviewed.   Constitutional:       General: She is not in acute distress.     Appearance: Normal appearance. "   Neurological:      Mental Status: She is alert.         HEMOGLOBIN A1C  Lab Results   Component Value Date    HGBA1C 9.6 10/28/2021       GLUCOSE  Glucose   Date Value Ref Range Status   10/28/2021 210 (A) 70 - 130 mg/dL Final         Assessment / Plan      Assessment & Plan:  1. Uncontrolled type 2 diabetes mellitus with hyperglycemia (HCC)  Her A1c today is higher than it was in July and above goal at 9.6%.  I reviewed her blood glucose readings these are very variable.  We discussed the importance of consistent healthy eating habits.  On workdays she will increase her Novolin into 56 units in the morning and continue 50 units in the evening.  On weekend days she will continue 52 units during the day and she will continue Novolin R 12 units plus a sliding scale.  I rewrote her sliding scale so she will know how much insulin to take.  She will continue Jardiance 25 mg daily.  Her blood pressure is okay today.  Her weight is up 4 pounds since her appointment in July.  I encouraged healthy eating habits and physical activity as tolerated.  - POC Glycosylated Hemoglobin (Hb A1C)  - POC Glucose, Blood    2. Type 2 diabetes mellitus with diabetic polyneuropathy, with long-term current use of insulin (HCC)  Her A1c is well above goal at 9.6%.  We increased her Novolin in during the weekdays when her blood sugars are higher.  I encouraged her to continue to see the podiatrist regularly.    3. Primary hypertension  Her blood pressures okay today.  She will continue her current medications.    4. Acquired hypothyroidism  We check TFTs at her appointment in July.  These were normal.  She will continue the levothyroxine 175 mcg daily.  Patient to call as needed.      Return in about 3 months (around 1/28/2022) for Recheck 3-4 mos.     This note was dictated using Dragon voice recognition.    Janay Jefferson PA-C  10/28/2021

## 2021-10-28 NOTE — PATIENT INSTRUCTIONS
Humulin R sliding Scale  +2 units 200-249mg/dl  +4 units 250-299 mg/dl  +6 units 300-349 mg/dl  +8 units >350mg/dl    Increase Novolin N on work days to 56 units in the morning, continue 50 units in the evening

## 2021-11-18 RX ORDER — LEVOTHYROXINE SODIUM 175 UG/1
TABLET ORAL
Qty: 90 TABLET | Refills: 1 | Status: SHIPPED | OUTPATIENT
Start: 2021-11-18 | End: 2022-04-18

## 2022-01-06 RX ORDER — EMPAGLIFLOZIN 25 MG/1
TABLET, FILM COATED ORAL
Qty: 90 TABLET | Refills: 1 | Status: SHIPPED | OUTPATIENT
Start: 2022-01-06 | End: 2022-04-27

## 2022-01-14 RX ORDER — BLOOD SUGAR DIAGNOSTIC
STRIP MISCELLANEOUS
Qty: 300 EACH | Refills: 1 | Status: SHIPPED | OUTPATIENT
Start: 2022-01-14 | End: 2022-04-27

## 2022-01-14 RX ORDER — BLOOD SUGAR DIAGNOSTIC
STRIP MISCELLANEOUS
Qty: 300 EACH | Refills: 1 | Status: SHIPPED | OUTPATIENT
Start: 2022-01-14 | End: 2022-01-14 | Stop reason: SDUPTHER

## 2022-01-17 ENCOUNTER — OFFICE VISIT (OUTPATIENT)
Dept: OBSTETRICS AND GYNECOLOGY | Facility: CLINIC | Age: 72
End: 2022-01-17

## 2022-01-17 VITALS
BODY MASS INDEX: 35.92 KG/M2 | WEIGHT: 237 LBS | SYSTOLIC BLOOD PRESSURE: 118 MMHG | HEIGHT: 68 IN | DIASTOLIC BLOOD PRESSURE: 60 MMHG

## 2022-01-17 DIAGNOSIS — N95.0 PMB (POSTMENOPAUSAL BLEEDING): Primary | ICD-10-CM

## 2022-01-17 DIAGNOSIS — D25.1 INTRAMURAL AND SUBMUCOUS LEIOMYOMA OF UTERUS: ICD-10-CM

## 2022-01-17 DIAGNOSIS — D25.0 INTRAMURAL AND SUBMUCOUS LEIOMYOMA OF UTERUS: ICD-10-CM

## 2022-01-17 DIAGNOSIS — E66.9 OBESITY (BMI 30-39.9): ICD-10-CM

## 2022-01-17 PROCEDURE — 57500 BIOPSY OF CERVIX: CPT | Performed by: OBSTETRICS & GYNECOLOGY

## 2022-01-17 PROCEDURE — 99204 OFFICE O/P NEW MOD 45 MIN: CPT | Performed by: OBSTETRICS & GYNECOLOGY

## 2022-01-17 RX ORDER — MEDROXYPROGESTERONE ACETATE 10 MG/1
10 TABLET ORAL 2 TIMES DAILY
Qty: 30 TABLET | Refills: 1 | Status: SHIPPED | OUTPATIENT
Start: 2022-01-17 | End: 2022-02-17 | Stop reason: SDUPTHER

## 2022-01-17 NOTE — PROGRESS NOTES
"Post Menopausal bleeding      Subjective   HPI  Nuris Ribeiro is a 71 y.o. female, , who presents with initial evaluation of post menopausal bleeding.      She states she has experienced this problem for 2 duration: weeks.  She describes the severity as severe.  She states that the problem is waxing and waning.  The patient reports additional symptoms as none.  The patient has  been evaluated:  with TVUS, performed last week, result 52mm mass in the lowers uterine segment near the uterus and an endometrial thickness of 6mm.  In the past the patient has tried none.    Had had spotting for past 1-2yr.      Still bleeding, sometimes heavy and sometimes not.    Patient is postmenopausal..  Partner Status: Marital Status: .     Additional OB/GYN History   Last Pap :  negative    History of abnormal Pap smear: no    Tobacco Usage?: No     The additional following portions of the patient's history were reviewed and updated as appropriate: problem list.    Review of Systems   Genitourinary: Positive for vaginal bleeding.   All other systems reviewed and are negative.      I have reviewed and agree with the HPI, ROS, and historical information as entered above. Azucena Chakraborty MD    Objective   /60   Ht 172.7 cm (68\")   Wt 108 kg (237 lb)   BMI 36.04 kg/m²     Physical Exam    General:  well developed; obese, no acute distress  Skin:  No suspicious lesions seen  Thyroid: normal to inspection and palpation  Abdomen: soft, non-tender; no masses  no umbilical or inguinal hernias are present  no hepato-splenomegaly  Pelvis: Clinical staff was present for exam  External genitalia:  normal appearance of the external genitalia including Bartholin's and [place]'s glands.  Urethra: no masses or tenderness  Urethral meatus: normal size;  No lesions or signs of prolapse  Bladder: non tender to palpation, no masses, no prolapse  Vagina:  normal pink mucosa without prolapse or lesions.  Cervix:  normal " appearance.  Uterus:  Mobile, difficult to palpate fundus.  Adnexa:  normal bimanual exam of the adnexa.  Perineum/Anus: normal appearance, no external hemorrhoids  Ext: 2+ pulses, no edema    Procedures    After the indications, risks, benefits, and alternatives to performing and endometrial biopsy were explained to the patient, opted to move forward with the endometrial biopsy. A urine pregnancy was not done    PROCEDURE:  The patient was placed on the table in the supine lithotomy position.  She was draped in the appropriate manner.  A speculum was placed in the vagina.  The cervix was visualized and prepped with Betadine. A tenaculum was not used. A small plastic 5 mm Pipelle syringe curette was inserted into the cervical canal.  The uterus was sounded to 10 cms.  A vigorous four quadrant biopsy was performed, removing a large amount of tissue but appeared to be mostly blood.  This tissue was placed in Formalin and sent to pathology.  The patient tolerated the procedure well and reported Mild cramping.  She had Mild cramping at the time of discharge.      Assessment/Plan     Assessment     Problem List Items Addressed This Visit     PMB (postmenopausal bleeding) - Primary    Relevant Orders    US Non-ob Transvaginal (Completed)    Tissue Pathology Exam    Intramural and submucous leiomyoma of uterus    Obesity (BMI 30-39.9)          Plan     1. Endometrial Biopsy done  - mostly blood obtained  2. Provera to stop bleeding.   3. Will call with results, treatment planning.  4. Call the office in 5 business days for biopsy results.  5. Patient instructed to call the office if develops a fever of 100.4 or greater, vaginal bleeding heavier than a period, foul vaginal discharge or pain.      Azucena Chakraborty MD  01/17/2022

## 2022-01-18 ENCOUNTER — TELEPHONE (OUTPATIENT)
Dept: OBSTETRICS AND GYNECOLOGY | Facility: CLINIC | Age: 72
End: 2022-01-18

## 2022-01-18 NOTE — TELEPHONE ENCOUNTER
Reviewed medication orders; provera sent yesterday by Dr. Chakraborty. 20mg daily for PMB. Verified dosing with [facility] pharmacy; verbalized understanding.

## 2022-01-18 NOTE — TELEPHONE ENCOUNTER
[place] from [facility] pharmacy called she had a question about a medication that was called in.

## 2022-01-24 ENCOUNTER — TELEPHONE (OUTPATIENT)
Dept: OBSTETRICS AND GYNECOLOGY | Facility: CLINIC | Age: 72
End: 2022-01-24

## 2022-01-24 NOTE — TELEPHONE ENCOUNTER
Pt medication has been working and stopped the bleeding. She wants to know if she should continue to take it or stop. She also wanted to know about her results from her biopsy as well.

## 2022-01-25 NOTE — TELEPHONE ENCOUNTER
"Pt requesting records to be sent to the [facility] at Dr. Pa Milan's office so she can be up to date. Also she is aware Dr. Chakraborty is out of the office this week and we have not been able to ask her about cont the provera and she is wondering if the \"mass\" still needs to be removed? She can cont the provera for now and we can send a request for Dr. Chakraborty to respond without her having to come in.  "

## 2022-02-03 ENCOUNTER — OFFICE VISIT (OUTPATIENT)
Dept: ENDOCRINOLOGY | Facility: CLINIC | Age: 72
End: 2022-02-03

## 2022-02-03 VITALS
OXYGEN SATURATION: 99 % | SYSTOLIC BLOOD PRESSURE: 128 MMHG | HEIGHT: 68 IN | WEIGHT: 235 LBS | DIASTOLIC BLOOD PRESSURE: 68 MMHG | HEART RATE: 76 BPM | BODY MASS INDEX: 35.61 KG/M2

## 2022-02-03 DIAGNOSIS — E11.65 UNCONTROLLED TYPE 2 DIABETES MELLITUS WITH HYPERGLYCEMIA: Primary | ICD-10-CM

## 2022-02-03 DIAGNOSIS — E11.42 TYPE 2 DIABETES MELLITUS WITH DIABETIC POLYNEUROPATHY, WITH LONG-TERM CURRENT USE OF INSULIN: ICD-10-CM

## 2022-02-03 DIAGNOSIS — I10 PRIMARY HYPERTENSION: ICD-10-CM

## 2022-02-03 DIAGNOSIS — E03.9 ACQUIRED HYPOTHYROIDISM: ICD-10-CM

## 2022-02-03 DIAGNOSIS — Z79.4 TYPE 2 DIABETES MELLITUS WITH DIABETIC POLYNEUROPATHY, WITH LONG-TERM CURRENT USE OF INSULIN: ICD-10-CM

## 2022-02-03 LAB
EXPIRATION DATE: ABNORMAL
EXPIRATION DATE: NORMAL
GLUCOSE BLDC GLUCOMTR-MCNC: 136 MG/DL (ref 70–130)
HBA1C MFR BLD: 8.7 %
Lab: ABNORMAL
Lab: NORMAL

## 2022-02-03 PROCEDURE — 83036 HEMOGLOBIN GLYCOSYLATED A1C: CPT | Performed by: PHYSICIAN ASSISTANT

## 2022-02-03 PROCEDURE — 82947 ASSAY GLUCOSE BLOOD QUANT: CPT | Performed by: PHYSICIAN ASSISTANT

## 2022-02-03 PROCEDURE — 99214 OFFICE O/P EST MOD 30 MIN: CPT | Performed by: PHYSICIAN ASSISTANT

## 2022-02-03 PROCEDURE — 3052F HG A1C>EQUAL 8.0%<EQUAL 9.0%: CPT | Performed by: PHYSICIAN ASSISTANT

## 2022-02-03 RX ORDER — HUMAN INSULIN 100 [IU]/ML
INJECTION, SOLUTION SUBCUTANEOUS
Refills: 12
Start: 2022-02-03

## 2022-02-03 NOTE — PROGRESS NOTES
Office Note      Date: 2022  Patient Name: Nuris Ribeiro  MRN: [id number]  : 1950    Chief Complaint   Patient presents with   • Diabetes       History of Present Illness:   Nuris Ribeiro is a 71 y.o. female who presents for follow-up for type 2 diabetes.  She remains on Jardiance 25 mg daily, Novolin N 56 units in the morning and 50 units in the evening as well as Novolin R 12 units plus her sliding scale with meals.  She checks her blood sugar 3-4 times daily.  She denies any severe or frequent hypoglycemia.  Recently she has had some higher readings in the middle of the day.  She reports this is typically when she is snacking at work.  She is up-to-date on her eye exam she had an appointment about 6 months ago.  She has had her COVID vaccine as well as her flu vaccine this .  She continues to see the podiatrist every 2 to 3 months.  She had fasting labs done with her primary care physician in February.  She reports she does believe she has a physical coming up.    She is currently seeing the gynecologist recently because of some postmenopausal bleeding.  She has a follow-up later this month.  She reports she has been concerned about her daughter who was diagnosed with an autoimmune liver disease.  They just recently did a biopsy and are awaiting results.  She remains on the levothyroxine 175 mcg daily.  She reports she feels okay from a thyroid standpoint.  We did her thyroid labs at her appointment in July.      Subjective     Review of Systems:   Review of Systems   Constitutional: Negative.    Cardiovascular: Negative.    Gastrointestinal: Negative.    Endocrine: Negative.    Neurological: Negative.        The following portions of the patient's history were reviewed and updated as appropriate: allergies, current medications, past family history, past medical history, past social history, past surgical history and problem list.    Objective     Vitals:    22 0823   BP:  "128/68   Pulse: 76   SpO2: 99%   Weight: 107 kg (235 lb)   Height: 172.7 cm (68\")   PainSc: 0-No pain     Body mass index is 35.73 kg/m².    Physical Exam  Vitals reviewed.   Constitutional:       General: She is not in acute distress.     Appearance: Normal appearance.   Neurological:      Mental Status: She is alert.         HEMOGLOBIN A1C  Lab Results   Component Value Date    HGBA1C 8.7 02/03/2022       GLUCOSE  Glucose   Date Value Ref Range Status   02/03/2022 136 (A) 70 - 130 mg/dL Final         TSH  Lab Results   Component Value Date    TSH 1.770 07/20/2021       Assessment / Plan      Assessment & Plan:  1. Uncontrolled type 2 diabetes mellitus with hyperglycemia (HCC)  Her A1c has improved since her appointment in October.  Today it is 8.7%.  This is still above goal but improving.  I reviewed her blood sugar readings she brought in today.  Her blood sugars middle of the day have been elevated especially on days she works.  We will increase her Novolin in to 60 units on workdays and she will continue 56 units in the morning on nonwork days.  She will continue 50 units of Novolin and in the evening.  She will continue Novolin R 12 units plus her sliding scale with meals.  I typed these instructions for her so she can have a copy.  She will send in blood sugars for review and adjustment as needed.  She will continue the Jardiance 25 mg daily.  Her weight is down 9 pounds since her appointment in October.  I congratulated her on her efforts and encouraged continued healthy eating habits and physical activity as tolerated.  I encouraged her to bring in her have a copy of her labs done with her primary care physician sent in for review.  - POC Glycosylated Hemoglobin (Hb A1C)  - POC Glucose, Blood    2. Type 2 diabetes mellitus with diabetic polyneuropathy, with long-term current use of insulin (HCC)  She continues to see the podiatrist regularly.  Her A1c has improved but is still above goal.  We increased her " insulin today.    3. Primary hypertension  Her blood pressure is okay today.  She will continue her current medications.    4. Acquired hypothyroidism  She reports she feels well from a thyroid standpoint.  Her TSH was okay in July.  She will continue the levothyroxine 175 mcg daily.  Patient to call as needed.      Return in about 3 months (around 5/3/2022) for Recheck 3-4 mos.     This note was dictated using Dragon voice recognition.    Janay Jefferson PA-C  02/03/2022

## 2022-02-03 NOTE — PATIENT INSTRUCTIONS
Humulin N increase to 60 units on work mornings continue 56 units on weekend morning.  Continue 50 units in the evening    Humulin R slidingHumulin R sliding Scale 12 units with meals plus  +2 units 200-249mg/dl  +4 units 250-299 mg/dl  +6 units 300-349 mg/dl  +8 units >350mg/dl Scale

## 2022-02-07 ENCOUNTER — OFFICE VISIT (OUTPATIENT)
Dept: OBSTETRICS AND GYNECOLOGY | Facility: CLINIC | Age: 72
End: 2022-02-07

## 2022-02-07 VITALS
SYSTOLIC BLOOD PRESSURE: 120 MMHG | DIASTOLIC BLOOD PRESSURE: 70 MMHG | BODY MASS INDEX: 35.46 KG/M2 | HEIGHT: 68 IN | WEIGHT: 234 LBS

## 2022-02-07 DIAGNOSIS — D25.0 INTRAMURAL AND SUBMUCOUS LEIOMYOMA OF UTERUS: ICD-10-CM

## 2022-02-07 DIAGNOSIS — N95.0 PMB (POSTMENOPAUSAL BLEEDING): Primary | ICD-10-CM

## 2022-02-07 DIAGNOSIS — D25.1 INTRAMURAL AND SUBMUCOUS LEIOMYOMA OF UTERUS: ICD-10-CM

## 2022-02-07 PROCEDURE — 99213 OFFICE O/P EST LOW 20 MIN: CPT | Performed by: OBSTETRICS & GYNECOLOGY

## 2022-02-07 NOTE — PROGRESS NOTES
Chief Complaint   Patient presents with   • Follow-up       Subjective   HPI  Nuris Ribeiro is a 71 y.o. female, , who presents for post menopausal bleeding. She is taking Provera BID and the bleeding stopped a few days into the Provera, but started again last Friday. She states the current bleeding is light, bright red and only when she wipes. She denies pelvic pain or cramping.  She states she has experienced this problem for a few months.  She describes the severity as mild.  She states that the problem is annoying.  The patient reports additional symptoms as none.        Additional OB/GYN History   Current contraception: contraceptive methods: Post menopausal status  Desires to: continue contraception    Last Completed Pap Smear     This patient has no relevant Health Maintenance data.            Last Completed Mammogram     This patient has no relevant Health Maintenance data.           OB History        3    Para   2    Term   2            AB   1    Living   2       SAB   1    IAB        Ectopic        Molar        Multiple        Live Births   2                Health Maintenance   Topic Date Due   • DXA SCAN  Never done   • COLORECTAL CANCER SCREENING  Never done   • COVID-19 Vaccine (1) Never done   • Pneumococcal Vaccine 65+ (1 of 2 - PPSV23) Never done   • TDAP/TD VACCINES (1 - Tdap) Never done   • HEPATITIS C SCREENING  Never done   • MAMMOGRAM  2018   • URINE MICROALBUMIN  10/18/2019   • LIPID PANEL  2020   • HEMOGLOBIN A1C  2022   • ANNUAL WELLNESS VISIT  2022   • DIABETIC FOOT EXAM  2022   • DIABETIC EYE EXAM  10/23/2022   • INFLUENZA VACCINE  Completed   • ZOSTER VACCINE  Completed       The additional following portions of the patient's history were reviewed and updated as appropriate: allergies, current medications, past family history, past medical history, past social history, past surgical history and problem list.    Review of Systems  "  All other systems reviewed and are negative.      I have reviewed and agree with the HPI, ROS, and historical information as entered above. Azucena Chakraborty MD    Objective   /70   Ht 172.7 cm (68\")   Wt 106 kg (234 lb)   LMP 02/04/2022 (Exact Date)   Breastfeeding No   BMI 35.58 kg/m²     OBGyn Exam     General:          well developed; obese, no acute distress  Skin:    No suspicious lesions seen  Thyroid:           normal to inspection and palpation  Abdomen:        soft, non-tender; no masses  no umbilical or inguinal hernias are present  Pelvis:  Deferred  Ext: 2+ pulses, no edema    Ultrasound reviewed; despite progesterone and bleeding decreased, still irregular, thickened endometrium with clot vs. Polyp/mass.    Assessment/Plan     Diagnoses and all orders for this visit:    1. PMB (postmenopausal bleeding) (Primary)  -     US Non-ob Transvaginal  -     External Facility Surgical/Procedural Request; Future    2. Intramural and submucous leiomyoma of uterus        Plan     1. Discussed options.  I want to balance fixing her bleeding with risks of surgery.  We could consider hysterectomy, but I think that myosure should clean out lining of uterus and alleviate bleeding without so much perioperative risk.  She will need to be cleared by Dr. Buck to be off of her plavix for 5-7 days.  She will continue her bASA prior to and after surgery.   Return for NEEDS SURGERY SCHEDULED.      Azucena Chakraborty MD  02/07/2022  "

## 2022-02-16 RX ORDER — MEDROXYPROGESTERONE ACETATE 10 MG/1
TABLET ORAL
Qty: 30 TABLET | Refills: 0 | OUTPATIENT
Start: 2022-02-16

## 2022-02-17 DIAGNOSIS — N95.0 PMB (POSTMENOPAUSAL BLEEDING): Primary | ICD-10-CM

## 2022-02-17 DIAGNOSIS — D25.1 INTRAMURAL AND SUBMUCOUS LEIOMYOMA OF UTERUS: ICD-10-CM

## 2022-02-17 DIAGNOSIS — D25.0 INTRAMURAL AND SUBMUCOUS LEIOMYOMA OF UTERUS: ICD-10-CM

## 2022-02-17 RX ORDER — MEDROXYPROGESTERONE ACETATE 10 MG/1
10 TABLET ORAL 2 TIMES DAILY
Qty: 60 TABLET | Refills: 0 | Status: SHIPPED | OUTPATIENT
Start: 2022-02-17 | End: 2022-03-21

## 2022-02-17 NOTE — TELEPHONE ENCOUNTER
Patient called and was wondering about the medication provera, she ran out and needs a refill until her surgery and the pharmacy denied  it

## 2022-02-17 NOTE — TELEPHONE ENCOUNTER
The sign for the Provera was 10mg take 2 times a day with 30 and with 1 refill and now she now has 2 pills left. I will send in a new rx for Dr. Chakraborty to have until her surgery 3/28/2022

## 2022-03-18 DIAGNOSIS — D25.0 INTRAMURAL AND SUBMUCOUS LEIOMYOMA OF UTERUS: ICD-10-CM

## 2022-03-18 DIAGNOSIS — N95.0 PMB (POSTMENOPAUSAL BLEEDING): ICD-10-CM

## 2022-03-18 DIAGNOSIS — D25.1 INTRAMURAL AND SUBMUCOUS LEIOMYOMA OF UTERUS: ICD-10-CM

## 2022-03-21 RX ORDER — MEDROXYPROGESTERONE ACETATE 10 MG/1
TABLET ORAL
Qty: 60 TABLET | Refills: 0 | Status: SHIPPED | OUTPATIENT
Start: 2022-03-21 | End: 2022-04-27

## 2022-03-28 ENCOUNTER — CLINICAL SUPPORT NO REQUIREMENTS (OUTPATIENT)
Dept: PREADMISSION TESTING | Facility: HOSPITAL | Age: 72
End: 2022-03-28

## 2022-03-28 ENCOUNTER — OFFICE VISIT (OUTPATIENT)
Dept: OBSTETRICS AND GYNECOLOGY | Facility: CLINIC | Age: 72
End: 2022-03-28

## 2022-03-28 VITALS
WEIGHT: 235 LBS | BODY MASS INDEX: 35.61 KG/M2 | DIASTOLIC BLOOD PRESSURE: 80 MMHG | SYSTOLIC BLOOD PRESSURE: 140 MMHG | HEIGHT: 68 IN

## 2022-03-28 DIAGNOSIS — Z01.818 PRE-OP TESTING: Primary | ICD-10-CM

## 2022-03-28 DIAGNOSIS — E66.9 OBESITY (BMI 30-39.9): ICD-10-CM

## 2022-03-28 DIAGNOSIS — Z79.4 TYPE 2 DIABETES MELLITUS WITH MILD NONPROLIFERATIVE RETINOPATHY WITHOUT MACULAR EDEMA, WITH LONG-TERM CURRENT USE OF INSULIN, UNSPECIFIED LATERALITY: ICD-10-CM

## 2022-03-28 DIAGNOSIS — N95.0 PMB (POSTMENOPAUSAL BLEEDING): Primary | ICD-10-CM

## 2022-03-28 DIAGNOSIS — Z86.718 HISTORY OF DVT (DEEP VEIN THROMBOSIS): ICD-10-CM

## 2022-03-28 DIAGNOSIS — D25.1 INTRAMURAL AND SUBMUCOUS LEIOMYOMA OF UTERUS: ICD-10-CM

## 2022-03-28 DIAGNOSIS — I10 PRIMARY HYPERTENSION: ICD-10-CM

## 2022-03-28 DIAGNOSIS — D25.0 INTRAMURAL AND SUBMUCOUS LEIOMYOMA OF UTERUS: ICD-10-CM

## 2022-03-28 DIAGNOSIS — E11.3299 TYPE 2 DIABETES MELLITUS WITH MILD NONPROLIFERATIVE RETINOPATHY WITHOUT MACULAR EDEMA, WITH LONG-TERM CURRENT USE OF INSULIN, UNSPECIFIED LATERALITY: ICD-10-CM

## 2022-03-28 LAB — SARS-COV-2 RNA PNL SPEC NAA+PROBE: NOT DETECTED

## 2022-03-28 PROCEDURE — U0005 INFEC AGEN DETEC AMPLI PROBE: HCPCS

## 2022-03-28 PROCEDURE — 99213 OFFICE O/P EST LOW 20 MIN: CPT | Performed by: OBSTETRICS & GYNECOLOGY

## 2022-03-28 PROCEDURE — U0004 COV-19 TEST NON-CDC HGH THRU: HCPCS

## 2022-03-28 PROCEDURE — C9803 HOPD COVID-19 SPEC COLLECT: HCPCS

## 2022-03-28 NOTE — PROGRESS NOTES
"Subjective   Nuris Ribeiro is a 71 y.o. year old  who is scheduled to have D/C Hysteroscopy with Myosure at [facility] on 3/30/22 at 9:30am.     Her last menstrual period was No LMP recorded (lmp unknown).   Her birth control method is post menopausal  Outpatient: Pre op labs done in our office      Patient has a left bundle branch block.  She also is on plavix normally but will have held for 6 days on day of surgery.  Continues aspirin.        COVID testing is scheduled 3/28/22 @ 10am    She understand the risks of bleeding, infections, possible damage to other organ systems, including but not limited to the gastrointestinal tract and genitourinary tract. She also understands the specific risks listed in the pre op information (video ,pamphlets, etc.)    She has review and signed the pre op consent form.    She has been instructed to have a light dinner the night before surgery, then nothing to eat or drink after midnight.  She is on the following medications:    She has confirmed that she is not allergic to latex.    On the day of surgery: Arrive at the hospital at 8am.  You will talk with the anesthesiologist that morning.  An IV will be started to provide fluids and sedation.  The procedure will take approximately 1 hour(s).  You will need to have someone drive you home after the surgery.    Past Medical History:   Diagnosis Date   • Abnormal CXR     worsening chronic changes, see report   • CKD (chronic kidney disease), stage III (HCC)     BUN/Cr .40 gfr 38  followed by nephrology   • Coronary artery disease     s/p stenting , on ASA/Plavix, followed by Dr. Buck   • Diabetes mellitus (HCC)     Dx 30+ yrs ago, thinks insulin last 15+ years.  \"I have not been a good diabetic\" - says didn't check her sugars for years.  A1c >9   • Diabetic peripheral neuropathy (HCC)    • Diastasis of rectus abdominis     massive   • Dyslipidemia    • Dyspepsia     rare. no QUEENIE sx's or RUQ pains.  serum h. pyl neg "   • Dyspnea on exertion    • Fatigue    • Gangrene (HCC)     (R) 5th toe amputated   • Gas gangrene (HCC)    • Gout     recently started on allopurinol but doesn't know why   • History of DVT (deep vein thrombosis)     years ago, etiology unclear, tx w/ Warfarin   • Hypertension    • Hypothyroidism    • Leukocytosis     13.98, asx   • Morbid obesity (HCC)    • Peripheral edema    • PVD (peripheral vascular disease) (HCC)     w/ cellulitis of RLE 2017 tx w/ Vanc/Rocephin   • Type 2 diabetes mellitus (HCC)    • Venous stasis dermatitis     R>L, with phelbitis s/p Rocephin/Flagyl    • Ventral hernia     periumbilical assoc w large rectus diastasis, chronic incarc   • Vitamin D deficiency    • Wears glasses      Past Surgical History:   Procedure Laterality Date   • TONSILLECTOMY     • CORONARY ANGIOPLASTY WITH STENT PLACEMENT     • TOE AMPUTATION Right 2015    5th toe, d/t gangrene   • COLONOSCOPY     • GASTRIC SLEEVE LAPAROSCOPIC N/A 2017    Procedure: GASTRIC SLEEVE LAPAROSCOPIC;  Surgeon: Tiago Obregon MD;  Location: [facility];  Service:    • CATARACT EXTRACTION     • EYE SURGERY       OB History    Para Term  AB Living   3 2 2 0 1 2   SAB IAB Ectopic Molar Multiple Live Births   1 0 0 0 0 2      # Outcome Date GA Lbr Lew/2nd Weight Sex Delivery Anes PTL Lv   3 SAB            2 Term      Vag-Spont   AYDEE   1 Term      Vag-Spont   AYDEE     Social History     Tobacco Use   Smoking Status Never Smoker   Smokeless Tobacco Never Used     Social History     Substance and Sexual Activity   Alcohol Use No     Social History     Substance and Sexual Activity   Drug Use No     Prior to Admission medications    Medication Sig Start Date End Date Taking? Authorizing Provider   Accu-Chek Rebeca Plus test strip USE AS DIRECTED THREE TIMES DAILY.  Dx code E11.65, Z79.4 22   Eliazar Hines MD   allopurinol (ZYLOPRIM) 100 MG tablet Take 100 mg by mouth Daily. 2/3/17   Provider,  "MD Leonardo   aspirin 81 MG tablet Take 81 mg by mouth Daily.    Leonardo Castaneda MD   Blood Glucose Monitoring Suppl (Accu-Chek Rebeca Plus) w/Device kit Use to check blood sugar three times daily. Dx E11.9 5/18/21   Janay Jefferson PA   Brimonidine Tartrate (Lumify) 0.025 % solution ophthalmic solution Every 8 (Eight) Hours.    Leonardo Castaneda MD   cholecalciferol (VITAMIN D3) 1000 UNITS tablet Take 1,000 Units by mouth Daily.    Leonardo Castaneda MD   clopidogrel (PLAVIX) 75 MG tablet  10/26/17   Leonardo Castaneda MD   Cyanocobalamin (VITAMIN B 12 PO) Take 2,500 mcg by mouth.    Leonardo Castaneda MD   ezetimibe (ZETIA) 10 MG tablet Take 10 mg by mouth Daily. 9/8/20   Leonardo Castaneda MD   hydrochlorothiazide (HYDRODIURIL) 12.5 MG tablet  12/8/17   Leonardo Castaneda MD   insulin NPH (humuLIN N,novoLIN N) 100 UNIT/ML injection Inject  under the skin into the appropriate area as directed 2 (Two) Times a Day Before Meals. novolin N - 57 units in am, 50 units in evening  novolin R - 12 units (plus, sliding scale) three times daily with meals    Leonardo Castaneda MD   insulin regular (NovoLIN R) 100 UNIT/ML injection 12 units TID with meals plus +2 units 200-249mg/dl +4 units 250-299 mg/dl +6 units 300-349 mg/dl +8 units >350mg/dl Scale 2/3/22   Janay Jefferson PA   Insulin Syringe-Needle U-100 (BD Insulin Syringe U/F) 31G X 5/16\" 0.5 ML misc Use as directed bid 7/15/21   Janay Jefferson PA   Jardiance 25 MG tablet tablet TAKE 1 TABLET EVERY DAY 1/6/22   Janay Jefferson PA   levothyroxine (SYNTHROID, LEVOTHROID) 175 MCG tablet TAKE 1 TABLET EVERY DAY 11/18/21   Janay Jefferson PA   lisinopril (PRINIVIL,ZESTRIL) 2.5 MG tablet  11/13/17   Leonardo Castaneda MD   medroxyPROGESTERone (PROVERA) 10 MG tablet Take 1 tablet by mouth twice daily 3/21/22   Mariana Morales, APRN   Multiple Vitamin (MULTI VITAMIN PO) Take 1 tablet by mouth Daily.    " "Provider, MD Leonardo   rosuvastatin (CRESTOR) 40 MG tablet Take 40 mg by mouth Daily.    Provider, MD Leonardo     Allergies   Allergen Reactions   • Amoxicillin Rash     can tolerate Rocephin       Review of Systems   All other systems reviewed and are negative.        Objective   /80   Ht 172.7 cm (68\")   Wt 107 kg (235 lb)   LMP 02/04/2022 (Exact Date)   BMI 35.73 kg/m²   General: well developed; well nourished  no acute distress   Heart: Not performed.   Lungs: breathing is unlabored   Abdomen: soft, non-tender; no masses  no umbilical or inguinal hernias are present  no hepato-splenomegaly   Pelvis: Not performed.today         Diagnoses and all orders for this visit:    1. PMB (postmenopausal bleeding) (Primary)    2. Intramural and submucous leiomyoma of uterus    3. History of DVT (deep vein thrombosis)    4. Primary hypertension    5. Type 2 diabetes mellitus with mild nonproliferative retinopathy without macular edema, with long-term current use of insulin, unspecified laterality (HCC)    6. Obesity (BMI 30-39.9)        Instructions:  1. The day of surgery, do not chew gum or smoke. Remove all jewelry, nail polish and contact lens prior to coming to the hospital. Do not bring large sums of money or valuables.    2. Reviewed with the patient the risk of bleeding, infections, possible damage to other organ systems, including but not limited to the gastrointestinal tract and genitourinary tract.  Reviewed the risk of anesthesia as well as the risk the surgery will not produce the desired results.  Operative permit signed and scanned into the chart.  3. Reviewed the risk of perforation of the uterus.  4. JARVIS report has been reviewed.  5. Pain Medication Consent Form has been signed.  A review regarding proper medication administration; impact on driving and working while medicated; the safety of use in pregnancy; the potential for overdose; and the proper disposal and stroage of controlled " medications has been done with the patient.  6. Electronically Identified Patient Education Materials provided electronically  Return in about 2 weeks (around 4/11/2022) for FOR POSTOP VISIT.    Azucena Chakraborty MD  3/30/2022       (Pt's PCP is Pa Milan DO)

## 2022-03-29 ENCOUNTER — TELEPHONE (OUTPATIENT)
Dept: OBSTETRICS AND GYNECOLOGY | Facility: CLINIC | Age: 72
End: 2022-03-29

## 2022-03-29 NOTE — TELEPHONE ENCOUNTER
Patient had surgery tomorrow states she discussed having a medicine for her surgery, does not remember what is wondering if this is something that will be put in the IV or something being called in she has to

## 2022-03-29 NOTE — TELEPHONE ENCOUNTER
Spoke with Dr. Chakraborty; per MD, antibiotics through IV and cytotec buccal on admission. No pharmacy Rx needed. Patient verbalized understanding.

## 2022-03-29 NOTE — TELEPHONE ENCOUNTER
Patient reports Dr. Chakraborty mentioned a medication that she may be putting in her IV (vs prescription) prior to surgery. Patient needing clarification. Informed patient that this RN will follow up with MD and give her a call back; verbalized understanding.

## 2022-03-30 ENCOUNTER — OUTSIDE FACILITY SERVICE (OUTPATIENT)
Dept: OBSTETRICS AND GYNECOLOGY | Facility: CLINIC | Age: 72
End: 2022-03-30

## 2022-03-30 ENCOUNTER — LAB REQUISITION (OUTPATIENT)
Dept: LAB | Facility: HOSPITAL | Age: 72
End: 2022-03-30

## 2022-03-30 DIAGNOSIS — N84.0 ENDOMETRIAL POLYP: Primary | ICD-10-CM

## 2022-03-30 DIAGNOSIS — N95.0 POSTMENOPAUSAL BLEEDING: ICD-10-CM

## 2022-03-30 PROCEDURE — 88305 TISSUE EXAM BY PATHOLOGIST: CPT | Performed by: OBSTETRICS & GYNECOLOGY

## 2022-03-30 PROCEDURE — 58558 HYSTEROSCOPY BIOPSY: CPT | Performed by: OBSTETRICS & GYNECOLOGY

## 2022-03-30 RX ORDER — HYDROCODONE BITARTRATE AND ACETAMINOPHEN 5; 325 MG/1; MG/1
1 TABLET ORAL EVERY 6 HOURS PRN
Qty: 5 TABLET | Refills: 0 | Status: SHIPPED | OUTPATIENT
Start: 2022-03-30 | End: 2022-04-14

## 2022-03-31 LAB
CYTO UR: NORMAL
LAB AP CASE REPORT: NORMAL
LAB AP CLINICAL INFORMATION: NORMAL
PATH REPORT.FINAL DX SPEC: NORMAL
PATH REPORT.GROSS SPEC: NORMAL

## 2022-04-01 DIAGNOSIS — N85.00 ENDOMETRIAL HYPERPLASIA: Primary | ICD-10-CM

## 2022-04-01 NOTE — PROGRESS NOTES
Need to please get scheduled with either Dr. Jerome or Dr. Paula soon.  I have spoken with patient and given her results.  I have also put in order for referral

## 2022-04-05 ENCOUNTER — TELEPHONE (OUTPATIENT)
Dept: OBSTETRICS AND GYNECOLOGY | Facility: CLINIC | Age: 72
End: 2022-04-05

## 2022-04-05 NOTE — TELEPHONE ENCOUNTER
I think if having no problems/has no questions, ok to just f/u with Dr. Paula.  Otherwise I'm happy to see her.

## 2022-04-05 NOTE — TELEPHONE ENCOUNTER
Patient called had D&C on 3/30th. Have a f/u appt on 4/12, but has been referred to GYN Oncology, wants to know if she should still be seen on the 12th with Dr. Chakraborty, please advise.

## 2022-04-05 NOTE — TELEPHONE ENCOUNTER
Patient seeing Dr. Paula 4/14/22. Asking if needs to keep f/u with TEN on 4/12/22.     Instructed patient I would keep PO appointment for now since TEN initial operating MD. Will cb if TEN feels otherwise. She vu.

## 2022-04-14 ENCOUNTER — OFFICE VISIT (OUTPATIENT)
Dept: GYNECOLOGIC ONCOLOGY | Facility: CLINIC | Age: 72
End: 2022-04-14

## 2022-04-14 VITALS
WEIGHT: 236.8 LBS | HEIGHT: 68 IN | SYSTOLIC BLOOD PRESSURE: 141 MMHG | DIASTOLIC BLOOD PRESSURE: 63 MMHG | BODY MASS INDEX: 35.89 KG/M2 | HEART RATE: 76 BPM | RESPIRATION RATE: 15 BRPM | TEMPERATURE: 98.4 F

## 2022-04-14 DIAGNOSIS — N85.02 COMPLEX ATYPICAL ENDOMETRIAL HYPERPLASIA: Primary | ICD-10-CM

## 2022-04-14 DIAGNOSIS — N18.31 STAGE 3A CHRONIC KIDNEY DISEASE: ICD-10-CM

## 2022-04-14 DIAGNOSIS — I73.9 PVD (PERIPHERAL VASCULAR DISEASE): ICD-10-CM

## 2022-04-14 DIAGNOSIS — I10 PRIMARY HYPERTENSION: ICD-10-CM

## 2022-04-14 DIAGNOSIS — E11.3299 TYPE 2 DIABETES MELLITUS WITH MILD NONPROLIFERATIVE RETINOPATHY WITHOUT MACULAR EDEMA, WITH LONG-TERM CURRENT USE OF INSULIN, UNSPECIFIED LATERALITY: ICD-10-CM

## 2022-04-14 DIAGNOSIS — E78.5 DYSLIPIDEMIA: ICD-10-CM

## 2022-04-14 DIAGNOSIS — M10.9 GOUT, UNSPECIFIED CAUSE, UNSPECIFIED CHRONICITY, UNSPECIFIED SITE: ICD-10-CM

## 2022-04-14 DIAGNOSIS — Z79.4 TYPE 2 DIABETES MELLITUS WITH MILD NONPROLIFERATIVE RETINOPATHY WITHOUT MACULAR EDEMA, WITH LONG-TERM CURRENT USE OF INSULIN, UNSPECIFIED LATERALITY: ICD-10-CM

## 2022-04-14 DIAGNOSIS — E03.9 ACQUIRED HYPOTHYROIDISM: ICD-10-CM

## 2022-04-14 PROCEDURE — 99205 OFFICE O/P NEW HI 60 MIN: CPT | Performed by: OBSTETRICS & GYNECOLOGY

## 2022-04-14 RX ORDER — OXYCODONE HYDROCHLORIDE 5 MG/1
5 TABLET ORAL ONCE
Status: CANCELLED | OUTPATIENT
Start: 2022-04-14 | End: 2022-04-14

## 2022-04-14 RX ORDER — HEPARIN SODIUM 5000 [USP'U]/ML
5000 INJECTION, SOLUTION INTRAVENOUS; SUBCUTANEOUS ONCE
Status: CANCELLED | OUTPATIENT
Start: 2022-04-14 | End: 2022-04-14

## 2022-04-14 RX ORDER — GABAPENTIN 100 MG/1
300 CAPSULE ORAL ONCE
Status: CANCELLED | OUTPATIENT
Start: 2022-04-14 | End: 2022-04-14

## 2022-04-14 RX ORDER — ACETAMINOPHEN 325 MG/1
1000 TABLET ORAL ONCE
Status: CANCELLED | OUTPATIENT
Start: 2022-04-14 | End: 2022-04-14

## 2022-04-14 RX ORDER — SODIUM CHLORIDE, SODIUM LACTATE, POTASSIUM CHLORIDE, CALCIUM CHLORIDE 600; 310; 30; 20 MG/100ML; MG/100ML; MG/100ML; MG/100ML
100 INJECTION, SOLUTION INTRAVENOUS CONTINUOUS
Status: CANCELLED | OUTPATIENT
Start: 2022-04-14

## 2022-04-14 NOTE — PROGRESS NOTES
"     New Patient Office Visit      Patient Name: Nuris Ribeiro  : 1950   MRN: [id number]     Referring Physician: Azucena Chakraborty*    Chief Complaint:    Chief Complaint   Patient presents with   • Endometrial hyperplasia       History of Present Illness: Nuris Ribeiro is a 71 y.o. female who is here today as a consultation with gynecologic oncology for complex atypical hyperplasia.  The patient reports that she started having postmenopausal bleeding.  She was started on Provera and continue to have some bleeding.  She had an ultrasound demonstrated multiple fibroids and a thickened endometrial stripe with possible blood clot.  She underwent an attempted an atrial biopsy in the office but mostly clot was obtained.  She then underwent a hysteroscopy with D&C and was found to have at least complex atypical hyperplasia suspicious for endometrial adenocarcinoma she presents today for discussion of next steps..    Oncologic History:  Oncology/Hematology History   Complex atypical endometrial hyperplasia   2022 Initial Diagnosis    Referred by Stella Chakraborty MD    Patient presented for postmenopausal bleeding despite Provera.    2022: TVUS with uterus with multiple fibroids.  Largest of which measures 2.7 cm in maximal dimension.  Endometrial stripe thickened with a 23 x 13 x 17 mm hypoechoic area.  Bilateral ovaries appear normal.  3/30/2022: Hysteroscopy with D&C with at least complex atypical hyperplasia suspicious for endometrial adenocarcinoma          Past Medical History:   Past Medical History:   Diagnosis Date   • Abnormal CXR     worsening chronic changes, see report   • CKD (chronic kidney disease), stage III (HCC)     BUN/Cr .40 gfr 38  followed by nephrology   • Coronary artery disease     s/p stenting , on ASA/Plavix, followed by Dr. Buck   • Diabetes mellitus (HCC)     Dx 30+ yrs ago, thinks insulin last 15+ years.  \"I have not been a good diabetic\" - says " didn't check her sugars for years.  A1c >9   • Diabetic peripheral neuropathy (HCC)    • Diastasis of rectus abdominis     massive   • Dyslipidemia    • Dyspepsia     rare. no QUEENIE sx's or RUQ pains.  serum h. pyl neg   • Dyspnea on exertion    • Fatigue    • Gangrene (HCC)     (R) 5th toe amputated   • Gas gangrene (HCC)    • Gout     recently started on allopurinol but doesn't know why   • History of DVT (deep vein thrombosis)     years ago, etiology unclear, tx w/ Warfarin   • Hypertension    • Hypothyroidism    • Leukocytosis     13.98, asx   • Morbid obesity (HCC)    • Peripheral edema    • PVD (peripheral vascular disease) (HCC)     w/ cellulitis of RLE 1/2017 tx w/ Vanc/Rocephin   • Type 2 diabetes mellitus (HCC)    • Venous stasis dermatitis     R>L, with phelbitis s/p Rocephin/Flagyl 1/17   • Ventral hernia     periumbilical assoc w large rectus diastasis, chronic incarc   • Vitamin D deficiency    • Wears glasses        Past Surgical History:   Past Surgical History:   Procedure Laterality Date   • CATARACT EXTRACTION     • COLONOSCOPY  2015   • CORONARY ANGIOPLASTY WITH STENT PLACEMENT  2008   • EYE SURGERY     • GASTRIC SLEEVE LAPAROSCOPIC N/A 6/2/2017    Procedure: GASTRIC SLEEVE LAPAROSCOPIC;  Surgeon: Tiago Obregon MD;  Location: [facility];  Service:    • TOE AMPUTATION Right 2015    5th toe, d/t gangrene   • TONSILLECTOMY  1967       Family History:   Family History   Problem Relation Age of Onset   • Hypertension Mother    • Macular degeneration Mother    • Other Mother         DYSLIPIDEMIA   • Leukemia Father    • Hypertension Father    • Diabetes Sister    • Stroke Sister    • Hypertension Sister    • Coronary artery disease Sister    • Heart disease Maternal Grandfather        Social History:   Social History     Socioeconomic History   • Marital status:    • Number of children: 2   Tobacco Use   • Smoking status: Never Smoker   • Smokeless tobacco: Never Used   Vaping Use   • Vaping  Use: Never used   Substance and Sexual Activity   • Alcohol use: No   • Drug use: No   • Sexual activity: Defer       Past OB/GYN History:   OB History    Para Term  AB Living   3 2 2   1 2   SAB IAB Ectopic Molar Multiple Live Births   1         2      # Outcome Date GA Lbr Lew/2nd Weight Sex Delivery Anes PTL Lv   3 SAB            2 Term      Vag-Spont   AYDEE   1 Term      Vag-Spont   AYDEE   Denies any history of abnormal pap tests  Denies any history of pelvic infection  Menopause 54  Denies any history of HRT     Health Maintenance:   Mammogram: Date:  Results: follow up view  Colonoscopy: Date:  Results: 5 year plan     Medications:     Current Outpatient Medications:   •  Accu-Chek Rebeca Plus test strip, USE AS DIRECTED THREE TIMES DAILY.  Dx code E11.65, Z79.4, Disp: 300 each, Rfl: 1  •  allopurinol (ZYLOPRIM) 100 MG tablet, Take 100 mg by mouth Daily., Disp: , Rfl:   •  aspirin 81 MG tablet, Take 81 mg by mouth Daily., Disp: , Rfl:   •  Blood Glucose Monitoring Suppl (Accu-Chek Rebeca Plus) w/Device kit, Use to check blood sugar three times daily. Dx E11.9, Disp: 1 kit, Rfl: 0  •  Brimonidine Tartrate (Lumify) 0.025 % solution ophthalmic solution, Every 8 (Eight) Hours., Disp: , Rfl:   •  cholecalciferol (VITAMIN D3) 1000 UNITS tablet, Take 1,000 Units by mouth Daily., Disp: , Rfl:   •  clopidogrel (PLAVIX) 75 MG tablet, , Disp: , Rfl:   •  Cyanocobalamin (VITAMIN B 12 PO), Take 2,500 mcg by mouth., Disp: , Rfl:   •  ezetimibe (ZETIA) 10 MG tablet, Take 10 mg by mouth Daily., Disp: , Rfl:   •  hydrochlorothiazide (HYDRODIURIL) 12.5 MG tablet, , Disp: , Rfl:   •  insulin NPH (humuLIN N,novoLIN N) 100 UNIT/ML injection, Inject  under the skin into the appropriate area as directed 2 (Two) Times a Day Before Meals. novolin N - 57 units in am, 50 units in evening novolin R - 12 units (plus, sliding scale) three times daily with meals, Disp: , Rfl:   •  insulin regular (NovoLIN R) 100 UNIT/ML  "injection, 12 units TID with meals plus +2 units 200-249mg/dl +4 units 250-299 mg/dl +6 units 300-349 mg/dl +8 units >350mg/dl Scale, Disp: , Rfl: 12  •  Insulin Syringe-Needle U-100 (BD Insulin Syringe U/F) 31G X 5/16\" 0.5 ML misc, Use as directed bid, Disp: 100 each, Rfl: 2  •  Jardiance 25 MG tablet tablet, TAKE 1 TABLET EVERY DAY, Disp: 90 tablet, Rfl: 1  •  levothyroxine (SYNTHROID, LEVOTHROID) 175 MCG tablet, TAKE 1 TABLET EVERY DAY, Disp: 90 tablet, Rfl: 1  •  lisinopril (PRINIVIL,ZESTRIL) 2.5 MG tablet, , Disp: , Rfl:   •  medroxyPROGESTERone (PROVERA) 10 MG tablet, Take 1 tablet by mouth twice daily, Disp: 60 tablet, Rfl: 0  •  Multiple Vitamin (MULTI VITAMIN PO), Take 1 tablet by mouth Daily., Disp: , Rfl:   •  rosuvastatin (CRESTOR) 40 MG tablet, Take 40 mg by mouth Daily., Disp: , Rfl:     Allergies:   Allergies   Allergen Reactions   • Amoxicillin Rash     can tolerate Rocephin       Review of Systems:   Review of Systems   Constitutional: Negative for activity change, appetite change, chills, fatigue and unexpected weight change.   Respiratory: Negative for cough, shortness of breath and wheezing.    Cardiovascular: Negative for chest pain, palpitations and leg swelling.   Gastrointestinal: Negative for abdominal distention, abdominal pain, constipation, diarrhea, nausea and vomiting.   Genitourinary: Positive for vaginal bleeding. Negative for dyspareunia, dysuria, flank pain, frequency, pelvic pain and urgency.   Musculoskeletal: Negative for arthralgias and back pain.   Neurological: Negative for dizziness, light-headedness, numbness and headaches.   Psychiatric/Behavioral: Negative for dysphoric mood. The patient is not nervous/anxious.         Objective     Physical Exam:  Vital Signs:   Vitals:    04/14/22 1335   BP: 141/63   Pulse: 76   Resp: 15   Temp: 98.4 °F (36.9 °C)   TempSrc: Temporal   Weight: 107 kg (236 lb 12.8 oz)   Height: 172.7 cm (67.99\")   PainSc: 0-No pain     BMI: Body mass index " is 36.01 kg/m².   ECOG score: 0           PHQ-2 Depression Screening  Little interest or pleasure in doing things?     Feeling down, depressed, or hopeless?     PHQ-2 Total Score       Physical Exam  Vitals and nursing note reviewed. Exam conducted with a chaperone present.   Constitutional:       General: She is not in acute distress.     Appearance: Normal appearance. She is well-developed. She is not diaphoretic.   HENT:      Head: Normocephalic and atraumatic.      Right Ear: External ear normal.      Left Ear: External ear normal.      Nose:      Comments: Mask  Eyes:      General: No scleral icterus.        Right eye: No discharge.         Left eye: No discharge.      Conjunctiva/sclera: Conjunctivae normal.   Neck:      Thyroid: No thyromegaly.   Cardiovascular:      Rate and Rhythm: Normal rate and regular rhythm.      Heart sounds: No murmur heard.  Pulmonary:      Effort: Pulmonary effort is normal. No respiratory distress.      Breath sounds: Normal breath sounds. No wheezing.   Abdominal:      General: Bowel sounds are normal. There is no distension.      Palpations: Abdomen is soft. There is no mass.      Tenderness: There is no abdominal tenderness. There is no guarding.   Musculoskeletal:         General: No swelling, tenderness, deformity or signs of injury.      Cervical back: Neck supple.      Right lower leg: No edema.      Left lower leg: No edema.   Lymphadenopathy:      Cervical: No cervical adenopathy.   Skin:     General: Skin is warm and dry.      Coloration: Skin is not jaundiced.      Findings: No erythema, lesion or rash.   Neurological:      General: No focal deficit present.      Mental Status: She is alert and oriented to person, place, and time. Mental status is at baseline.      Motor: No abnormal muscle tone.   Psychiatric:         Behavior: Behavior normal.         Thought Content: Thought content normal.         Assessment / Plan    Nuris Ribeiro is a 71 y.o. year old female  recently diagnosed with complex atypical hyperplasia of the endometrium. I have counseled her that the risk of concomitant endometrial cancer exceeds 40% and that surgery is the cornerstone of management. I have recommended that the patient undergo a robotic assisted total laparoscopic hysterectomy with bilateral salpingo-oophorectomy. Given the increased risk of concurrent cancer, I recommended lymph node assessment via the sentinel node approach +/- pelvic/para-aortic lymph node dissection pending operative findings.  We discussed the importance of awaiting final pathology and also discussed the nature of endometrial cancer. She is aware that the majority of endometrial cancer cases are found in the early stages; however, if more advanced disease is encountered, she may require chemotherapy, radiation or a combination of the two. She was also informed of the risks of the procedure including bleeding, infection, wound breakdown, blood clots, injury to surrounding organs requiring additional intervention, possible need for another procedure, and the need for a laparotomy if the surgery cannot be performed safely via the minimally invasive approach. We also discussed the anticipated hospital stay and recovery time. After all questions were answered, she did sign the informed consent and will be scheduled for surgery in the near future.      Type 2 diabetes: Currently on NPH and regular insulin. Patient to take 50% of morning dose on day of surgery.  She should hold her short acting insulin on the day of surgery. HgbA1C ordered.  History of coronary artery disease with stents in place: Sees Dr. Buck.  Per the patient she is okay to hold her Plavix for 7 days prior to surgery.  Dr. Schaefer would like her to continue her aspirin which is fine with me.  We will have the office reach out for surgical clearance.  The patient will also be seen by PT.  Hyperlipidemia: Patient to continue her rosuvastatin 40 mg  perioperatively.  Hypertension: Patient to hold her lisinopril and hydrochlorothiazide the morning of surgery.   Hypothyroidism: Patient took her levothyroxine 200 mcg on the day of surgery.  Gout: Okay to take perioperatively.   Chornic kidney disease: Will avoid nephrotoxic agents including NSAIDs  History of VTE: Plan for preop heparin.  Would send the patient home when direct oral anticoagulant for 28 days postoperatively.    Pain assessment was performed today as a part of patient’s care.  For patients with pain related to surgery, gynecologic malignancy or cancer treatment, the plan is as noted in the assessment/plan.  For patients with pain not related to these issues, they are to seek any further needed care from a more appropriate provider, such as PCP.      Diagnoses and all orders for this visit:    1. Complex atypical endometrial hyperplasia (Primary)  -     Case Request; Standing  -     COVID PRE-OP / PRE-PROCEDURE SCREENING ORDER (NO ISOLATION) - Swab, Nasopharynx; Future  -     CBC and Differential; Future  -     Comprehensive metabolic panel; Future  -     Hemoglobin A1c; Future  -     Type and screen; Future  -     ECG 12 Lead; Future  -     XR chest 2 vw; Future  -     Case Request    2. Type 2 diabetes mellitus with mild nonproliferative retinopathy without macular edema, with long-term current use of insulin, unspecified laterality (HCC)  -     Hemoglobin A1c; Future    3. Dyslipidemia    4. Primary hypertension    5. PVD (peripheral vascular disease) (HCC)    6. Gout, unspecified cause, unspecified chronicity, unspecified site    7. Stage 3a chronic kidney disease (HCC)    8. Acquired hypothyroidism    Other orders  -     Follow anesthesia standing orders.; Future  -     Obtain informed consent  -     Provide NPO Instructions to Patient; Future  -     Chlorhexidine Skin Prep; Future         Follow Up: Surgery    MINAL Paula MD  Gynecologic Oncology

## 2022-04-15 ENCOUNTER — DOCUMENTATION (OUTPATIENT)
Dept: GYNECOLOGIC ONCOLOGY | Facility: CLINIC | Age: 72
End: 2022-04-15

## 2022-04-18 RX ORDER — LEVOTHYROXINE SODIUM 175 UG/1
TABLET ORAL
Qty: 90 TABLET | Refills: 1 | Status: SHIPPED | OUTPATIENT
Start: 2022-04-18 | End: 2022-05-19

## 2022-04-27 ENCOUNTER — HOSPITAL ENCOUNTER (OUTPATIENT)
Dept: GENERAL RADIOLOGY | Facility: HOSPITAL | Age: 72
Discharge: HOME | End: 2022-04-27

## 2022-04-27 ENCOUNTER — TELEPHONE (OUTPATIENT)
Dept: GYNECOLOGIC ONCOLOGY | Facility: CLINIC | Age: 72
End: 2022-04-27

## 2022-04-27 ENCOUNTER — PRE-ADMISSION TESTING (OUTPATIENT)
Dept: PREADMISSION TESTING | Facility: HOSPITAL | Age: 72
End: 2022-04-27

## 2022-04-27 VITALS — BODY MASS INDEX: 36.05 KG/M2 | HEIGHT: 68 IN | WEIGHT: 237.88 LBS

## 2022-04-27 DIAGNOSIS — N85.02 COMPLEX ATYPICAL ENDOMETRIAL HYPERPLASIA: ICD-10-CM

## 2022-04-27 DIAGNOSIS — E11.3299 TYPE 2 DIABETES MELLITUS WITH MILD NONPROLIFERATIVE RETINOPATHY WITHOUT MACULAR EDEMA, WITH LONG-TERM CURRENT USE OF INSULIN, UNSPECIFIED LATERALITY: ICD-10-CM

## 2022-04-27 DIAGNOSIS — Z79.4 TYPE 2 DIABETES MELLITUS WITH MILD NONPROLIFERATIVE RETINOPATHY WITHOUT MACULAR EDEMA, WITH LONG-TERM CURRENT USE OF INSULIN, UNSPECIFIED LATERALITY: ICD-10-CM

## 2022-04-27 LAB
ABO GROUP BLD: NORMAL
ALBUMIN SERPL-MCNC: 3.8 G/DL (ref 3.5–5.2)
ALBUMIN/GLOB SERPL: 1.4 G/DL
ALP SERPL-CCNC: 88 U/L (ref 39–117)
ALT SERPL W P-5'-P-CCNC: 20 U/L (ref 1–33)
ANION GAP SERPL CALCULATED.3IONS-SCNC: 10 MMOL/L (ref 5–15)
AST SERPL-CCNC: 21 U/L (ref 1–32)
BASOPHILS # BLD AUTO: 0.06 10*3/MM3 (ref 0–0.2)
BASOPHILS NFR BLD AUTO: 0.5 % (ref 0–1.5)
BILIRUB SERPL-MCNC: 0.5 MG/DL (ref 0–1.2)
BLD GP AB SCN SERPL QL: NEGATIVE
BUN SERPL-MCNC: 15 MG/DL (ref 8–23)
BUN/CREAT SERPL: 13 (ref 7–25)
CALCIUM SPEC-SCNC: 9.9 MG/DL (ref 8.6–10.5)
CHLORIDE SERPL-SCNC: 103 MMOL/L (ref 98–107)
CO2 SERPL-SCNC: 28 MMOL/L (ref 22–29)
CREAT SERPL-MCNC: 1.15 MG/DL (ref 0.57–1)
DEPRECATED RDW RBC AUTO: 38.5 FL (ref 37–54)
EGFRCR SERPLBLD CKD-EPI 2021: 51.4 ML/MIN/1.73
EOSINOPHIL # BLD AUTO: 0.51 10*3/MM3 (ref 0–0.4)
EOSINOPHIL NFR BLD AUTO: 4.2 % (ref 0.3–6.2)
ERYTHROCYTE [DISTWIDTH] IN BLOOD BY AUTOMATED COUNT: 12.8 % (ref 12.3–15.4)
GLOBULIN UR ELPH-MCNC: 2.8 GM/DL
GLUCOSE SERPL-MCNC: 142 MG/DL (ref 65–99)
HBA1C MFR BLD: 10.6 % (ref 4.8–5.6)
HCT VFR BLD AUTO: 45.6 % (ref 34–46.6)
HGB BLD-MCNC: 15.4 G/DL (ref 12–15.9)
IMM GRANULOCYTES # BLD AUTO: 0.03 10*3/MM3 (ref 0–0.05)
IMM GRANULOCYTES NFR BLD AUTO: 0.2 % (ref 0–0.5)
INR PPP: 1.05 (ref 0.84–1.13)
LYMPHOCYTES # BLD AUTO: 2.86 10*3/MM3 (ref 0.7–3.1)
LYMPHOCYTES NFR BLD AUTO: 23.6 % (ref 19.6–45.3)
MCH RBC QN AUTO: 28.3 PG (ref 26.6–33)
MCHC RBC AUTO-ENTMCNC: 33.8 G/DL (ref 31.5–35.7)
MCV RBC AUTO: 83.8 FL (ref 79–97)
MONOCYTES # BLD AUTO: 0.78 10*3/MM3 (ref 0.1–0.9)
MONOCYTES NFR BLD AUTO: 6.4 % (ref 5–12)
NEUTROPHILS NFR BLD AUTO: 65.1 % (ref 42.7–76)
NEUTROPHILS NFR BLD AUTO: 7.86 10*3/MM3 (ref 1.7–7)
NRBC BLD AUTO-RTO: 0 /100 WBC (ref 0–0.2)
PLATELET # BLD AUTO: 386 10*3/MM3 (ref 140–450)
PMV BLD AUTO: 9 FL (ref 6–12)
POTASSIUM SERPL-SCNC: 4.5 MMOL/L (ref 3.5–5.2)
PROT SERPL-MCNC: 6.6 G/DL (ref 6–8.5)
PROTHROMBIN TIME: 13.6 SECONDS (ref 11.4–14.4)
RBC # BLD AUTO: 5.44 10*6/MM3 (ref 3.77–5.28)
RH BLD: POSITIVE
SARS-COV-2 RNA PNL SPEC NAA+PROBE: NOT DETECTED
SODIUM SERPL-SCNC: 141 MMOL/L (ref 136–145)
T&S EXPIRATION DATE: NORMAL
WBC NRBC COR # BLD: 12.1 10*3/MM3 (ref 3.4–10.8)

## 2022-04-27 PROCEDURE — 85025 COMPLETE CBC W/AUTO DIFF WBC: CPT

## 2022-04-27 PROCEDURE — 86850 RBC ANTIBODY SCREEN: CPT

## 2022-04-27 PROCEDURE — C9803 HOPD COVID-19 SPEC COLLECT: HCPCS

## 2022-04-27 PROCEDURE — U0004 COV-19 TEST NON-CDC HGH THRU: HCPCS

## 2022-04-27 PROCEDURE — 83036 HEMOGLOBIN GLYCOSYLATED A1C: CPT

## 2022-04-27 PROCEDURE — 85610 PROTHROMBIN TIME: CPT

## 2022-04-27 PROCEDURE — 36415 COLL VENOUS BLD VENIPUNCTURE: CPT

## 2022-04-27 PROCEDURE — 80053 COMPREHEN METABOLIC PANEL: CPT

## 2022-04-27 PROCEDURE — 86901 BLOOD TYPING SEROLOGIC RH(D): CPT

## 2022-04-27 PROCEDURE — U0005 INFEC AGEN DETEC AMPLI PROBE: HCPCS

## 2022-04-27 PROCEDURE — 86900 BLOOD TYPING SEROLOGIC ABO: CPT

## 2022-04-27 PROCEDURE — 71046 X-RAY EXAM CHEST 2 VIEWS: CPT

## 2022-04-27 NOTE — TELEPHONE ENCOUNTER
Spoke with patient about her A1c per Dr. Paula. Patient is taking inuslin but she is not taking anything else.     She was on Jardiance but it was too expensive.     Advised patient that she needs to get with her PCP for better management. She verbalized understanding.     We confirmed her surgery time for 06:00 am on Friday.

## 2022-04-28 ENCOUNTER — ANESTHESIA EVENT (OUTPATIENT)
Dept: PERIOP | Facility: HOSPITAL | Age: 72
End: 2022-04-28

## 2022-04-28 RX ORDER — FAMOTIDINE 10 MG/ML
20 INJECTION, SOLUTION INTRAVENOUS ONCE
Status: CANCELLED | OUTPATIENT
Start: 2022-04-28 | End: 2022-04-28

## 2022-04-29 ENCOUNTER — HOSPITAL ENCOUNTER (OUTPATIENT)
Facility: HOSPITAL | Age: 72
Discharge: HOME | End: 2022-04-29
Attending: OBSTETRICS & GYNECOLOGY | Admitting: OBSTETRICS & GYNECOLOGY

## 2022-04-29 ENCOUNTER — ANESTHESIA (OUTPATIENT)
Dept: PERIOP | Facility: HOSPITAL | Age: 72
End: 2022-04-29

## 2022-04-29 VITALS
WEIGHT: 237 LBS | DIASTOLIC BLOOD PRESSURE: 64 MMHG | BODY MASS INDEX: 35.92 KG/M2 | OXYGEN SATURATION: 95 % | HEIGHT: 68 IN | SYSTOLIC BLOOD PRESSURE: 130 MMHG | TEMPERATURE: 97 F | HEART RATE: 60 BPM | RESPIRATION RATE: 16 BRPM

## 2022-04-29 DIAGNOSIS — N95.0 PMB (POSTMENOPAUSAL BLEEDING): Primary | ICD-10-CM

## 2022-04-29 DIAGNOSIS — N85.02 COMPLEX ATYPICAL ENDOMETRIAL HYPERPLASIA: ICD-10-CM

## 2022-04-29 LAB
ABO GROUP BLD: NORMAL
GLUCOSE BLDC GLUCOMTR-MCNC: 196 MG/DL (ref 70–130)
GLUCOSE BLDC GLUCOMTR-MCNC: 210 MG/DL (ref 70–130)
QT INTERVAL: 470 MS
QTC INTERVAL: 477 MS
RH BLD: POSITIVE

## 2022-04-29 PROCEDURE — 93005 ELECTROCARDIOGRAM TRACING: CPT | Performed by: ANESTHESIOLOGY

## 2022-04-29 PROCEDURE — 25010000002 PROPOFOL 10 MG/ML EMULSION: Performed by: NURSE ANESTHETIST, CERTIFIED REGISTERED

## 2022-04-29 PROCEDURE — 38900 IO MAP OF SENT LYMPH NODE: CPT | Performed by: OBSTETRICS & GYNECOLOGY

## 2022-04-29 PROCEDURE — 25010000002 FENTANYL CITRATE (PF) 50 MCG/ML SOLUTION: Performed by: NURSE ANESTHETIST, CERTIFIED REGISTERED

## 2022-04-29 PROCEDURE — 25010000002 ONDANSETRON PER 1 MG: Performed by: NURSE ANESTHETIST, CERTIFIED REGISTERED

## 2022-04-29 PROCEDURE — 25010000002 HEPARIN (PORCINE) PER 1000 UNITS: Performed by: OBSTETRICS & GYNECOLOGY

## 2022-04-29 PROCEDURE — 58571 TLH W/T/O 250 G OR LESS: CPT | Performed by: OBSTETRICS & GYNECOLOGY

## 2022-04-29 PROCEDURE — 88307 TISSUE EXAM BY PATHOLOGIST: CPT | Performed by: OBSTETRICS & GYNECOLOGY

## 2022-04-29 PROCEDURE — 63710000001 INSULIN LISPRO (HUMAN) PER 5 UNITS

## 2022-04-29 PROCEDURE — 38571 LAPAROSCOPY LYMPHADENECTOMY: CPT | Performed by: OBSTETRICS & GYNECOLOGY

## 2022-04-29 PROCEDURE — 88309 TISSUE EXAM BY PATHOLOGIST: CPT | Performed by: OBSTETRICS & GYNECOLOGY

## 2022-04-29 PROCEDURE — 86900 BLOOD TYPING SEROLOGIC ABO: CPT

## 2022-04-29 PROCEDURE — 25010000002 CEFAZOLIN IN DEXTROSE 2-4 GM/100ML-% SOLUTION: Performed by: OBSTETRICS & GYNECOLOGY

## 2022-04-29 PROCEDURE — 25010000002 DEXAMETHASONE PER 1 MG: Performed by: NURSE ANESTHETIST, CERTIFIED REGISTERED

## 2022-04-29 PROCEDURE — 25010000002 FENTANYL CITRATE (PF) 50 MCG/ML SOLUTION

## 2022-04-29 PROCEDURE — 82962 GLUCOSE BLOOD TEST: CPT

## 2022-04-29 PROCEDURE — 93010 ELECTROCARDIOGRAM REPORT: CPT | Performed by: STUDENT IN AN ORGANIZED HEALTH CARE EDUCATION/TRAINING PROGRAM

## 2022-04-29 PROCEDURE — 86901 BLOOD TYPING SEROLOGIC RH(D): CPT

## 2022-04-29 DEVICE — ABSORBABLE WOUND CLOSURE DEVICE
Type: IMPLANTABLE DEVICE | Site: PELVIS | Status: FUNCTIONAL
Brand: V-LOC 90

## 2022-04-29 DEVICE — FLOSEAL HEMOSTATIC MATRIX, 10ML
Type: IMPLANTABLE DEVICE | Site: PELVIS | Status: FUNCTIONAL
Brand: FLOSEAL HEMOSTATIC MATRIX

## 2022-04-29 DEVICE — ABSORBABLE WOUND CLOSURE DEVICE
Type: IMPLANTABLE DEVICE | Site: PELVIS | Status: FUNCTIONAL
Brand: V-LOC 180

## 2022-04-29 RX ORDER — BUPIVACAINE HYDROCHLORIDE AND EPINEPHRINE 5; 5 MG/ML; UG/ML
INJECTION, SOLUTION PERINEURAL AS NEEDED
Status: DISCONTINUED | OUTPATIENT
Start: 2022-04-29 | End: 2022-04-29 | Stop reason: HOSPADM

## 2022-04-29 RX ORDER — FENTANYL CITRATE 50 UG/ML
50 INJECTION, SOLUTION INTRAMUSCULAR; INTRAVENOUS
Status: DISCONTINUED | OUTPATIENT
Start: 2022-04-29 | End: 2022-04-29 | Stop reason: HOSPADM

## 2022-04-29 RX ORDER — SODIUM CHLORIDE 9 MG/ML
INJECTION, SOLUTION INTRAVENOUS AS NEEDED
Status: DISCONTINUED | OUTPATIENT
Start: 2022-04-29 | End: 2022-04-29 | Stop reason: HOSPADM

## 2022-04-29 RX ORDER — FENTANYL CITRATE 50 UG/ML
INJECTION, SOLUTION INTRAMUSCULAR; INTRAVENOUS AS NEEDED
Status: DISCONTINUED | OUTPATIENT
Start: 2022-04-29 | End: 2022-04-29 | Stop reason: SURG

## 2022-04-29 RX ORDER — BUPIVACAINE HCL/0.9 % NACL/PF 0.125 %
PLASTIC BAG, INJECTION (ML) EPIDURAL AS NEEDED
Status: DISCONTINUED | OUTPATIENT
Start: 2022-04-29 | End: 2022-04-29 | Stop reason: SURG

## 2022-04-29 RX ORDER — HYDROMORPHONE HYDROCHLORIDE 1 MG/ML
0.5 INJECTION, SOLUTION INTRAMUSCULAR; INTRAVENOUS; SUBCUTANEOUS
Status: DISCONTINUED | OUTPATIENT
Start: 2022-04-29 | End: 2022-04-29 | Stop reason: HOSPADM

## 2022-04-29 RX ORDER — ALBUTEROL SULFATE 2.5 MG/3ML
2.5 SOLUTION RESPIRATORY (INHALATION) ONCE AS NEEDED
Status: DISCONTINUED | OUTPATIENT
Start: 2022-04-29 | End: 2022-04-29 | Stop reason: HOSPADM

## 2022-04-29 RX ORDER — FAMOTIDINE 20 MG/1
20 TABLET, FILM COATED ORAL ONCE
Status: COMPLETED | OUTPATIENT
Start: 2022-04-29 | End: 2022-04-29

## 2022-04-29 RX ORDER — DEXAMETHASONE SODIUM PHOSPHATE 4 MG/ML
INJECTION, SOLUTION INTRA-ARTICULAR; INTRALESIONAL; INTRAMUSCULAR; INTRAVENOUS; SOFT TISSUE AS NEEDED
Status: DISCONTINUED | OUTPATIENT
Start: 2022-04-29 | End: 2022-04-29 | Stop reason: SURG

## 2022-04-29 RX ORDER — PROPOFOL 10 MG/ML
VIAL (ML) INTRAVENOUS AS NEEDED
Status: DISCONTINUED | OUTPATIENT
Start: 2022-04-29 | End: 2022-04-29 | Stop reason: SURG

## 2022-04-29 RX ORDER — MIDAZOLAM HYDROCHLORIDE 1 MG/ML
0.5 INJECTION INTRAMUSCULAR; INTRAVENOUS
Status: DISCONTINUED | OUTPATIENT
Start: 2022-04-29 | End: 2022-04-29 | Stop reason: HOSPADM

## 2022-04-29 RX ORDER — MAGNESIUM HYDROXIDE 1200 MG/15ML
LIQUID ORAL AS NEEDED
Status: DISCONTINUED | OUTPATIENT
Start: 2022-04-29 | End: 2022-04-29 | Stop reason: HOSPADM

## 2022-04-29 RX ORDER — DOCUSATE SODIUM 250 MG
250 CAPSULE ORAL 2 TIMES DAILY PRN
Qty: 40 CAPSULE | Refills: 0 | Status: SHIPPED | OUTPATIENT
Start: 2022-04-29 | End: 2022-05-19

## 2022-04-29 RX ORDER — SODIUM CHLORIDE 0.9 % (FLUSH) 0.9 %
10 SYRINGE (ML) INJECTION AS NEEDED
Status: DISCONTINUED | OUTPATIENT
Start: 2022-04-29 | End: 2022-04-29 | Stop reason: HOSPADM

## 2022-04-29 RX ORDER — CEFAZOLIN SODIUM 2 G/100ML
2 INJECTION, SOLUTION INTRAVENOUS ONCE
Status: COMPLETED | OUTPATIENT
Start: 2022-04-29 | End: 2022-04-29

## 2022-04-29 RX ORDER — ONDANSETRON 2 MG/ML
4 INJECTION INTRAMUSCULAR; INTRAVENOUS ONCE AS NEEDED
Status: DISCONTINUED | OUTPATIENT
Start: 2022-04-29 | End: 2022-04-29 | Stop reason: HOSPADM

## 2022-04-29 RX ORDER — ACETAMINOPHEN 325 MG/1
650 TABLET ORAL EVERY 6 HOURS PRN
Qty: 40 TABLET | Refills: 0 | Status: SHIPPED | OUTPATIENT
Start: 2022-04-29 | End: 2022-05-19

## 2022-04-29 RX ORDER — INSULIN LISPRO 100 [IU]/ML
0-7 INJECTION, SOLUTION INTRAVENOUS; SUBCUTANEOUS
Status: DISCONTINUED | OUTPATIENT
Start: 2022-04-29 | End: 2022-04-29 | Stop reason: HOSPADM

## 2022-04-29 RX ORDER — ONDANSETRON 2 MG/ML
INJECTION INTRAMUSCULAR; INTRAVENOUS AS NEEDED
Status: DISCONTINUED | OUTPATIENT
Start: 2022-04-29 | End: 2022-04-29 | Stop reason: SURG

## 2022-04-29 RX ORDER — ACETAMINOPHEN 500 MG
1000 TABLET ORAL ONCE
Status: COMPLETED | OUTPATIENT
Start: 2022-04-29 | End: 2022-04-29

## 2022-04-29 RX ORDER — INDOCYANINE GREEN AND WATER 25 MG
KIT INJECTION AS NEEDED
Status: DISCONTINUED | OUTPATIENT
Start: 2022-04-29 | End: 2022-04-29 | Stop reason: HOSPADM

## 2022-04-29 RX ORDER — FENTANYL CITRATE 50 UG/ML
INJECTION, SOLUTION INTRAMUSCULAR; INTRAVENOUS
Status: COMPLETED
Start: 2022-04-29 | End: 2022-04-29

## 2022-04-29 RX ORDER — LIDOCAINE HYDROCHLORIDE 10 MG/ML
INJECTION, SOLUTION EPIDURAL; INFILTRATION; INTRACAUDAL; PERINEURAL AS NEEDED
Status: DISCONTINUED | OUTPATIENT
Start: 2022-04-29 | End: 2022-04-29 | Stop reason: SURG

## 2022-04-29 RX ORDER — LIDOCAINE HYDROCHLORIDE 10 MG/ML
0.5 INJECTION, SOLUTION EPIDURAL; INFILTRATION; INTRACAUDAL; PERINEURAL ONCE AS NEEDED
Status: DISCONTINUED | OUTPATIENT
Start: 2022-04-29 | End: 2022-04-29 | Stop reason: HOSPADM

## 2022-04-29 RX ORDER — ONDANSETRON 4 MG/1
4 TABLET, FILM COATED ORAL EVERY 8 HOURS PRN
Qty: 20 TABLET | Refills: 0 | Status: SHIPPED | OUTPATIENT
Start: 2022-04-29 | End: 2022-05-19

## 2022-04-29 RX ORDER — SODIUM CHLORIDE, SODIUM LACTATE, POTASSIUM CHLORIDE, CALCIUM CHLORIDE 600; 310; 30; 20 MG/100ML; MG/100ML; MG/100ML; MG/100ML
9 INJECTION, SOLUTION INTRAVENOUS CONTINUOUS
Status: DISCONTINUED | OUTPATIENT
Start: 2022-04-29 | End: 2022-04-29 | Stop reason: HOSPADM

## 2022-04-29 RX ORDER — INSULIN LISPRO 100 [IU]/ML
INJECTION, SOLUTION INTRAVENOUS; SUBCUTANEOUS
Status: COMPLETED
Start: 2022-04-29 | End: 2022-04-29

## 2022-04-29 RX ORDER — ROCURONIUM BROMIDE 10 MG/ML
INJECTION, SOLUTION INTRAVENOUS AS NEEDED
Status: DISCONTINUED | OUTPATIENT
Start: 2022-04-29 | End: 2022-04-29 | Stop reason: SURG

## 2022-04-29 RX ORDER — HEPARIN SODIUM 5000 [USP'U]/ML
5000 INJECTION, SOLUTION INTRAVENOUS; SUBCUTANEOUS ONCE
Status: COMPLETED | OUTPATIENT
Start: 2022-04-29 | End: 2022-04-29

## 2022-04-29 RX ORDER — SODIUM CHLORIDE, SODIUM LACTATE, POTASSIUM CHLORIDE, CALCIUM CHLORIDE 600; 310; 30; 20 MG/100ML; MG/100ML; MG/100ML; MG/100ML
100 INJECTION, SOLUTION INTRAVENOUS CONTINUOUS
Status: DISCONTINUED | OUTPATIENT
Start: 2022-04-29 | End: 2022-04-29 | Stop reason: HOSPADM

## 2022-04-29 RX ORDER — GABAPENTIN 300 MG/1
300 CAPSULE ORAL ONCE
Status: COMPLETED | OUTPATIENT
Start: 2022-04-29 | End: 2022-04-29

## 2022-04-29 RX ORDER — OXYCODONE HYDROCHLORIDE 5 MG/1
5 TABLET ORAL EVERY 4 HOURS PRN
Qty: 15 TABLET | Refills: 0 | Status: SHIPPED | OUTPATIENT
Start: 2022-04-29 | End: 2022-05-19

## 2022-04-29 RX ORDER — OXYCODONE HYDROCHLORIDE 5 MG/1
5 TABLET ORAL ONCE
Status: COMPLETED | OUTPATIENT
Start: 2022-04-29 | End: 2022-04-29

## 2022-04-29 RX ORDER — SODIUM CHLORIDE 0.9 % (FLUSH) 0.9 %
10 SYRINGE (ML) INJECTION EVERY 12 HOURS SCHEDULED
Status: DISCONTINUED | OUTPATIENT
Start: 2022-04-29 | End: 2022-04-29 | Stop reason: HOSPADM

## 2022-04-29 RX ORDER — SODIUM CHLORIDE 9 MG/ML
INJECTION, SOLUTION INTRAVENOUS CONTINUOUS PRN
Status: DISCONTINUED | OUTPATIENT
Start: 2022-04-29 | End: 2022-04-29 | Stop reason: SURG

## 2022-04-29 RX ADMIN — GABAPENTIN 300 MG: 300 CAPSULE ORAL at 06:31

## 2022-04-29 RX ADMIN — ACETAMINOPHEN 1000 MG: 500 TABLET ORAL at 06:31

## 2022-04-29 RX ADMIN — SODIUM CHLORIDE, POTASSIUM CHLORIDE, SODIUM LACTATE AND CALCIUM CHLORIDE 100 ML/HR: 600; 310; 30; 20 INJECTION, SOLUTION INTRAVENOUS at 07:09

## 2022-04-29 RX ADMIN — SODIUM CHLORIDE: 9 INJECTION, SOLUTION INTRAVENOUS at 09:56

## 2022-04-29 RX ADMIN — FAMOTIDINE 20 MG: 20 TABLET ORAL at 06:31

## 2022-04-29 RX ADMIN — ONDANSETRON 4 MG: 2 INJECTION INTRAMUSCULAR; INTRAVENOUS at 09:07

## 2022-04-29 RX ADMIN — PROPOFOL 25 MCG/KG/MIN: 10 INJECTION, EMULSION INTRAVENOUS at 07:38

## 2022-04-29 RX ADMIN — SODIUM CHLORIDE: 9 INJECTION, SOLUTION INTRAVENOUS at 07:30

## 2022-04-29 RX ADMIN — FENTANYL CITRATE 100 MCG: 50 INJECTION, SOLUTION INTRAMUSCULAR; INTRAVENOUS at 10:12

## 2022-04-29 RX ADMIN — FENTANYL CITRATE 25 MCG: 0.05 INJECTION, SOLUTION INTRAMUSCULAR; INTRAVENOUS at 11:06

## 2022-04-29 RX ADMIN — FENTANYL CITRATE 25 MCG: 50 INJECTION, SOLUTION INTRAMUSCULAR; INTRAVENOUS at 11:06

## 2022-04-29 RX ADMIN — LIDOCAINE HYDROCHLORIDE 50 MG: 10 INJECTION, SOLUTION EPIDURAL; INFILTRATION; INTRACAUDAL; PERINEURAL at 07:38

## 2022-04-29 RX ADMIN — OXYCODONE 5 MG: 5 TABLET ORAL at 06:31

## 2022-04-29 RX ADMIN — INSULIN LISPRO 3 UNITS: 100 INJECTION, SOLUTION INTRAVENOUS; SUBCUTANEOUS at 10:41

## 2022-04-29 RX ADMIN — DEXAMETHASONE SODIUM PHOSPHATE 8 MG: 4 INJECTION, SOLUTION INTRA-ARTICULAR; INTRALESIONAL; INTRAMUSCULAR; INTRAVENOUS; SOFT TISSUE at 07:38

## 2022-04-29 RX ADMIN — Medication 200 MCG: at 08:04

## 2022-04-29 RX ADMIN — PROPOFOL 150 MG: 10 INJECTION, EMULSION INTRAVENOUS at 07:38

## 2022-04-29 RX ADMIN — ROCURONIUM BROMIDE 50 MG: 10 INJECTION, SOLUTION INTRAVENOUS at 07:38

## 2022-04-29 RX ADMIN — SUGAMMADEX 200 MG: 100 INJECTION, SOLUTION INTRAVENOUS at 09:51

## 2022-04-29 RX ADMIN — HEPARIN SODIUM 5000 UNITS: 5000 INJECTION, SOLUTION INTRAVENOUS; SUBCUTANEOUS at 07:07

## 2022-04-29 RX ADMIN — CEFAZOLIN SODIUM 2 G: 2 INJECTION, SOLUTION INTRAVENOUS at 07:44

## 2022-04-29 RX ADMIN — Medication 200 MCG: at 07:53

## 2022-05-02 ENCOUNTER — TELEPHONE (OUTPATIENT)
Dept: GYNECOLOGIC ONCOLOGY | Facility: CLINIC | Age: 72
End: 2022-05-02

## 2022-05-02 NOTE — TELEPHONE ENCOUNTER
I called patient. Informed her of her results and recommendations.     She states she didn't go to the restroom for a few days and then took the docusate sodium and had some diarrhea. Advised not to take that. Try something different. Its ok as long she is having a bm daily or every other day.     She said she is feeling better daily and hopes to keep improving.    She will call if she needs anything before her post op appointment.

## 2022-05-02 NOTE — TELEPHONE ENCOUNTER
----- Message from Jayshree Paula MD sent at 5/2/2022  1:27 PM EDT -----  Please let Ms. Ribeiro know that her pathology showed a lot of precancer but no invasive cancer. She does not need any additional treatment for this!  ----- Message -----  From: Lab, Background User  Sent: 5/2/2022   1:22 PM EDT  To: Jayshree Paula MD

## 2022-05-13 ENCOUNTER — TELEPHONE (OUTPATIENT)
Dept: GYNECOLOGIC ONCOLOGY | Facility: CLINIC | Age: 72
End: 2022-05-13

## 2022-05-13 NOTE — TELEPHONE ENCOUNTER
Spoke with MIKKI.  She states the wound looks good and intact.  She recommends washing the wound with normal soap and water and placing a gauze over top of that. She is to monitor for redness, fever, increased discharge and odor.     She verbalized understanding.

## 2022-05-13 NOTE — TELEPHONE ENCOUNTER
Patient called nurse triage line with incision problems.     She denies fever, pain, or redness.    She does note that the glue has come off all her incisions. The belly button incision is giving her some trouble.     It is draining and appears to be open a little bit.     She is sending a picture for review.

## 2022-05-19 ENCOUNTER — LAB (OUTPATIENT)
Dept: LAB | Facility: HOSPITAL | Age: 72
End: 2022-05-19

## 2022-05-19 ENCOUNTER — OFFICE VISIT (OUTPATIENT)
Dept: ENDOCRINOLOGY | Facility: CLINIC | Age: 72
End: 2022-05-19

## 2022-05-19 ENCOUNTER — OFFICE VISIT (OUTPATIENT)
Dept: GYNECOLOGIC ONCOLOGY | Facility: CLINIC | Age: 72
End: 2022-05-19

## 2022-05-19 VITALS
SYSTOLIC BLOOD PRESSURE: 132 MMHG | RESPIRATION RATE: 15 BRPM | WEIGHT: 232 LBS | TEMPERATURE: 97.5 F | HEART RATE: 75 BPM | HEIGHT: 68 IN | OXYGEN SATURATION: 98 % | DIASTOLIC BLOOD PRESSURE: 70 MMHG | BODY MASS INDEX: 35.16 KG/M2

## 2022-05-19 VITALS
SYSTOLIC BLOOD PRESSURE: 132 MMHG | DIASTOLIC BLOOD PRESSURE: 70 MMHG | BODY MASS INDEX: 35.16 KG/M2 | WEIGHT: 232 LBS | HEIGHT: 68 IN | OXYGEN SATURATION: 98 % | HEART RATE: 75 BPM

## 2022-05-19 DIAGNOSIS — E03.9 ACQUIRED HYPOTHYROIDISM: ICD-10-CM

## 2022-05-19 DIAGNOSIS — E03.9 ACQUIRED HYPOTHYROIDISM: Primary | ICD-10-CM

## 2022-05-19 DIAGNOSIS — Z09 POSTOP CHECK: Primary | ICD-10-CM

## 2022-05-19 DIAGNOSIS — N85.02 COMPLEX ATYPICAL ENDOMETRIAL HYPERPLASIA: ICD-10-CM

## 2022-05-19 DIAGNOSIS — I10 ESSENTIAL HYPERTENSION: ICD-10-CM

## 2022-05-19 DIAGNOSIS — E11.65 UNCONTROLLED TYPE 2 DIABETES MELLITUS WITH HYPERGLYCEMIA: ICD-10-CM

## 2022-05-19 LAB
T4 FREE SERPL-MCNC: 2.32 NG/DL (ref 0.93–1.7)
TSH SERPL DL<=0.05 MIU/L-ACNC: 0.92 UIU/ML (ref 0.27–4.2)

## 2022-05-19 PROCEDURE — 99024 POSTOP FOLLOW-UP VISIT: CPT | Performed by: OBSTETRICS & GYNECOLOGY

## 2022-05-19 PROCEDURE — 84439 ASSAY OF FREE THYROXINE: CPT

## 2022-05-19 PROCEDURE — 99214 OFFICE O/P EST MOD 30 MIN: CPT | Performed by: PHYSICIAN ASSISTANT

## 2022-05-19 PROCEDURE — 84443 ASSAY THYROID STIM HORMONE: CPT

## 2022-05-19 RX ORDER — LEVOTHYROXINE SODIUM 0.15 MG/1
150 TABLET ORAL DAILY
Qty: 90 TABLET | Refills: 1 | Status: SHIPPED | OUTPATIENT
Start: 2022-05-19 | End: 2022-09-28

## 2022-05-19 NOTE — PROGRESS NOTES
"     Post Operative Office Visit      Patient Name: Nuris Ribeiro  : 1950   MRN: [id number]     Chief Complaint:  Postoperative visit    History of Present Illness: Nuris Ribeiro is a 71 y.o. female who is status post robotic assisted total laparoscopic hysterectomy with bilateral salpingo-oophorectomy bilateral pelvic sentinel lymph node dissection on 2022 for complex atypical hyperplasia of the uterus. Her post operative course has been unremarkable and continues to be recovering well. She is tolerating a normal diet. She reports normal return of bowel function. She states her pain is well controlled.  Does have some mild clear drainage from her midline incision.  Denies any fevers or chills.    Medical, surgical, and family history updated as needed.  Subjective      Review of Systems:   Review of Systems   Constitutional: Positive for fatigue. Negative for activity change and fever.   Respiratory: Negative for shortness of breath.    Cardiovascular: Negative for chest pain and leg swelling.   Gastrointestinal: Negative for abdominal pain, constipation, diarrhea, nausea and vomiting.   Genitourinary: Negative for difficulty urinating, vaginal bleeding and vaginal discharge.   Skin: Positive for wound (Mild drainage from supraumbilical port.).   Neurological: Negative for dizziness and light-headedness.        Objective     Physical Exam:  Vital Signs:   Vitals:    22 1107   BP: 132/70   Pulse: 75   Resp: 15   Temp: 97.5 °F (36.4 °C)   SpO2: 98%   Weight: 105 kg (232 lb)   Height: 172.7 cm (67.99\")   PainSc: 0-No pain     BMI: Body mass index is 35.28 kg/m².     Physical Exam  Vitals and nursing note reviewed. Exam conducted with a chaperone present.   Constitutional:       General: She is not in acute distress.     Appearance: Normal appearance. She is well-developed. She is not diaphoretic.   HENT:      Head: Normocephalic and atraumatic.      Right Ear: External ear normal.      " Left Ear: External ear normal.   Eyes:      General: No scleral icterus.        Right eye: No discharge.         Left eye: No discharge.      Conjunctiva/sclera: Conjunctivae normal.   Neck:      Thyroid: No thyromegaly.   Cardiovascular:      Rate and Rhythm: Normal rate.   Pulmonary:      Effort: Pulmonary effort is normal. No respiratory distress.   Abdominal:      General: There is no distension.      Palpations: Abdomen is soft. There is no mass.      Tenderness: There is no abdominal tenderness. There is no guarding.      Comments: Incisions clean, dry intact. Supraumibilical incision was scant clear drainage. Erythema and scaling of the skin to the right of the supraumbilical incision due to a prior Band-Aid placement. No evidence of infection.   Genitourinary:     Comments: External genitalia normal. Vagina with scant amount of blood-tinged discharge. Vaginal cuff intact. Uterus, cervix and adnexa surgically absent. Rectovaginal exam deferred.  Musculoskeletal:         General: No swelling, tenderness, deformity or signs of injury.      Cervical back: Neck supple.      Right lower leg: No edema.      Left lower leg: No edema.   Lymphadenopathy:      Cervical: No cervical adenopathy.   Skin:     General: Skin is warm and dry.      Coloration: Skin is not jaundiced.      Findings: No erythema, lesion or rash.   Neurological:      General: No focal deficit present.      Mental Status: She is alert and oriented to person, place, and time. Mental status is at baseline.      Motor: No abnormal muscle tone.   Psychiatric:         Behavior: Behavior normal.         Thought Content: Thought content normal.         Medications:     Current Outpatient Medications:   •  allopurinol (ZYLOPRIM) 100 MG tablet, Take 100 mg by mouth Daily., Disp: , Rfl:   •  aspirin 81 MG tablet, Take 81 mg by mouth Daily., Disp: , Rfl:   •  Brimonidine Tartrate (Lumify) 0.025 % solution ophthalmic solution, Administer 1 drop to both eyes  Daily., Disp: , Rfl:   •  cholecalciferol (VITAMIN D3) 1000 UNITS tablet, Take 1,000 Units by mouth Daily., Disp: , Rfl:   •  clopidogrel (PLAVIX) 75 MG tablet, Take 75 mg by mouth Daily. Stopped for surgery (stents), Disp: , Rfl:   •  Cyanocobalamin (VITAMIN B 12 PO), Take 2,500 mcg by mouth Every Morning., Disp: , Rfl:   •  empagliflozin (Jardiance) 25 MG tablet tablet, Take 1 tablet by mouth Daily., Disp: 90 tablet, Rfl: 2  •  ezetimibe (ZETIA) 10 MG tablet, Take 10 mg by mouth Daily., Disp: , Rfl:   •  hydrochlorothiazide (HYDRODIURIL) 12.5 MG tablet, Take 12.5 mg by mouth Daily., Disp: , Rfl:   •  insulin NPH (humuLIN N,novoLIN N) 100 UNIT/ML injection, Inject  under the skin into the appropriate area as directed 2 (Two) Times a Day Before Meals. novolin N - 57 units in am, 50 units in evening novolin R - 12 units (plus, sliding scale) three times daily with meals, Disp: , Rfl:   •  insulin regular (NovoLIN R) 100 UNIT/ML injection, 12 units TID with meals plus +2 units 200-249mg/dl +4 units 250-299 mg/dl +6 units 300-349 mg/dl +8 units >350mg/dl Scale, Disp: , Rfl: 12  •  levothyroxine (SYNTHROID, LEVOTHROID) 175 MCG tablet, TAKE 1 TABLET EVERY DAY (Patient taking differently: Take 175 mcg by mouth Every Morning.), Disp: 90 tablet, Rfl: 1  •  lisinopril (PRINIVIL,ZESTRIL) 2.5 MG tablet, Take 2.5 mg by mouth Daily., Disp: , Rfl:   •  Multiple Vitamin (MULTI VITAMIN PO), Take 1 tablet by mouth Daily., Disp: , Rfl:   •  rosuvastatin (CRESTOR) 40 MG tablet, Take 40 mg by mouth Every Night., Disp: , Rfl:   Current outpatient and discharge medications have been reconciled for the patient.  Reviewed by: Jayshree Paula MD      Allergies:   Allergies   Allergen Reactions   • Amoxicillin Rash     can tolerate Rocephin       Pathology:   1.  Right pelvic sentinel node, biopsy:  Negative for metastatic disease (0/1)    2.  Left pelvic sentinel node, biopsy: Negative for metastatic disease (0/1)    3.  Uterus and cervix  with bilateral adnexa, radical hysterectomy:  Extensive complex endometrial hyperplasia with cytologic atypia  No invasive carcinoma identified  Disordered proliferative phase endometrium with adenomyosis  Benign intramural and submucosal leiomyomas (largest 2.2 cm)  Reactive endocervical and ectocervical mucosa  Benign ovaries with an incidental right ovarian mucinous cyst  Unremarkable fallopian tubes    Operative findings again reviewed. Pathology report discussed.       Assessment / Plan    Nuris Ribeiro is s/p  robotic assisted total laparoscopic hysterectomy with bilateral salpingo-oophorectomy bilateral pelvic sentinel lymph node dissection on 4/29/2022 for complex atypical hyperplasia of the uterus and is recovering well from her surgery. Her final pathology revealed CAH but no cancer which will require no further therapy. She was reminded that though she no longer requires Pap tests, she should continue recommended cancer screening guidelines, assessment for osteoporosis, including the use of Vitamin D and calcium, and maintain a healthy lifestyle. She may continue her annual examinations/follow-up locally; however, we will be readily available if needed, particularly during the post-operative course.    I did instructed the patient to stop applying Neosporin to the skin around her supraumbilical incision as it appears that she is getting a contact dermatitis.  She does have a scant amount of clear drainage but there is no evidence of infection today.  Wound precautions discussed.     Diagnoses and all orders for this visit:    1. Postop check (Primary)    2. Complex atypical endometrial hyperplasia         Follow Up:   JOSE Paula MD  Gynecologic Oncology

## 2022-05-19 NOTE — PROGRESS NOTES
Office Note      Date: 2022  Patient Name: Nuris Ribeiro  MRN: [id number]  : 1950    Chief Complaint   Patient presents with   • Diabetes       History of Present Illness:   Nuris Ribeiro is a 71 y.o. female who presents for follow up for Type 2 diabetes.  She reports she has had 2 surgeries since her last appointment.  She had a D&C in late March since she was having some bleeding and then had to have a total hysterectomy in late April for atypical endometrial hyperplasia.  She reports she has her follow-up appointment with the surgeon today but she has been off work for a while.  She had an A1c completed before the surgery and this was 10.6%.  She reports she noted she did have some higher readings after her both of her surgeries.  She reports she is also been off Jardiance for several months due to cost.  She reports she has a deductible to meet at the beginning of the year and was unable to afford this medication.  She remains on Novolin in she is taking 52 units in the morning and 50 units in the evening she reports she was concerned about increasing the morning dose since she was not going to be at work.  She continues to do Novolin R 12 units plus her sliding scale.  She checks her blood sugar 3 times a day and is not interested in a continuous glucose monitor at this time.  She denies any severe or frequent hypoglycemia.  She is currently up-to-date on her eye exam but will probably be due later this summer.  She continues to see the podiatrist every 2 to 3 months and we did her foot exam at her appointment in September.    She had fasting labs completed in March with her primary care physician including a urine microalbumin creatinine ratio.  At this time her TSH was a little low.  She reports there were no adjustments in her medication.  She remains on levothyroxine 175 mcg daily.  She reports she is taking this regularly and correctly.      Subjective     Review of Systems:  "  Review of Systems   Constitutional: Negative.    Cardiovascular: Negative.    Gastrointestinal: Negative.    Endocrine: Negative.    Neurological: Negative.        The following portions of the patient's history were reviewed and updated as appropriate: allergies, current medications, past family history, past medical history, past social history, past surgical history and problem list.    Objective     Vitals:    05/19/22 0834   BP: 132/70   Pulse: 75   SpO2: 98%   Weight: 105 kg (232 lb)   Height: 172.7 cm (68\")   PainSc: 0-No pain     Body mass index is 35.28 kg/m².    Physical Exam  Vitals reviewed.   Constitutional:       General: She is not in acute distress.     Appearance: Normal appearance.   Neurological:      Mental Status: She is alert.         HEMOGLOBIN A1C  Lab Results   Component Value Date    HGBA1C 10.60 (H) 04/27/2022         CMP  Lab Results   Component Value Date    GLUCOSE 142 (H) 04/27/2022    BUN 15 04/27/2022    CREATININE 1.15 (H) 04/27/2022    EGFRIFNONA 47 (L) 10/18/2018    EGFRIFAFRI 56 (L) 06/14/2018    BCR 13.0 04/27/2022    K 4.5 04/27/2022    CO2 28.0 04/27/2022    CALCIUM 9.9 04/27/2022    PROTENTOTREF 6.9 06/14/2018    LABIL2 1.3 (L) 06/14/2018    AST 21 04/27/2022    ALT 20 04/27/2022         Assessment / Plan      Assessment & Plan:  1. Uncontrolled type 2 diabetes mellitus with hyperglycemia (HCC)  Her A1c last month was well above goal at 10.6%.  We discussed the importance of getting diabetes under better control.  I reviewed her blood glucose readings and we will increase her Novolin N to 54 units twice a day.  I encouraged her to send in blood sugar readings for review and adjustment as needed.  I sampled Jardiance 25 mg today and she will see if this is more affordable now that she has had 2 surgeries since her last visit.  We sent the prescription to [facility] as this appears to be the best option for her insurance.  I showed her the freestyle chirag as well as the Dexcom " continuous glucose monitoring device but she is not interested in this at this time.  Her weight is down 3 pounds since her appointment in February.  I encouraged healthy eating habits and physical activity as tolerated.    2. Acquired hypothyroidism  TFTs pending today.  Will send note with results and plan for now she will continue the levothyroxine 175 mcg daily.  - T4, Free; Future  - TSH; Future    3. Essential hypertension  Her blood pressure is okay today.  She will continue her current medications.      Return in about 3 months (around 8/19/2022) for Recheck 3-4 mos.     This note was dictated using Dragon voice recognition.      Janay Jefferson PA-C  05/19/2022

## 2022-05-24 ENCOUNTER — HOSPITAL ENCOUNTER (OUTPATIENT)
Facility: HOSPITAL | Age: 72
Setting detail: OBSERVATION
LOS: 1 days | Discharge: HOME | End: 2022-05-25
Attending: EMERGENCY MEDICINE | Admitting: OBSTETRICS & GYNECOLOGY

## 2022-05-24 DIAGNOSIS — Z90.710 S/P HYSTERECTOMY: ICD-10-CM

## 2022-05-24 DIAGNOSIS — N93.9 EXCESSIVE VAGINAL BLEEDING: Primary | ICD-10-CM

## 2022-05-24 DIAGNOSIS — Z79.01 ANTICOAGULATED: ICD-10-CM

## 2022-05-24 LAB
ABO GROUP BLD: NORMAL
ALBUMIN SERPL-MCNC: 3.3 G/DL (ref 3.5–5.2)
ALBUMIN SERPL-MCNC: 3.4 G/DL (ref 3.5–5.2)
ALBUMIN/GLOB SERPL: 1.1 G/DL
ALBUMIN/GLOB SERPL: 1.2 G/DL
ALP SERPL-CCNC: 66 U/L (ref 39–117)
ALP SERPL-CCNC: 70 U/L (ref 39–117)
ALT SERPL W P-5'-P-CCNC: 15 U/L (ref 1–33)
ALT SERPL W P-5'-P-CCNC: 17 U/L (ref 1–33)
ANION GAP SERPL CALCULATED.3IONS-SCNC: 11 MMOL/L (ref 5–15)
ANION GAP SERPL CALCULATED.3IONS-SCNC: 12 MMOL/L (ref 5–15)
AST SERPL-CCNC: 17 U/L (ref 1–32)
AST SERPL-CCNC: 21 U/L (ref 1–32)
BASOPHILS # BLD AUTO: 0.07 10*3/MM3 (ref 0–0.2)
BASOPHILS # BLD AUTO: 0.07 10*3/MM3 (ref 0–0.2)
BASOPHILS NFR BLD AUTO: 0.4 % (ref 0–1.5)
BASOPHILS NFR BLD AUTO: 0.6 % (ref 0–1.5)
BILIRUB SERPL-MCNC: 0.3 MG/DL (ref 0–1.2)
BILIRUB SERPL-MCNC: 0.4 MG/DL (ref 0–1.2)
BLD GP AB SCN SERPL QL: NEGATIVE
BUN SERPL-MCNC: 17 MG/DL (ref 8–23)
BUN SERPL-MCNC: 18 MG/DL (ref 8–23)
BUN/CREAT SERPL: 11.6 (ref 7–25)
BUN/CREAT SERPL: 12.9 (ref 7–25)
CALCIUM SPEC-SCNC: 9.2 MG/DL (ref 8.6–10.5)
CALCIUM SPEC-SCNC: 9.3 MG/DL (ref 8.6–10.5)
CHLORIDE SERPL-SCNC: 101 MMOL/L (ref 98–107)
CHLORIDE SERPL-SCNC: 102 MMOL/L (ref 98–107)
CO2 SERPL-SCNC: 23 MMOL/L (ref 22–29)
CO2 SERPL-SCNC: 25 MMOL/L (ref 22–29)
CREAT SERPL-MCNC: 1.4 MG/DL (ref 0.57–1)
CREAT SERPL-MCNC: 1.46 MG/DL (ref 0.57–1)
DEPRECATED RDW RBC AUTO: 40.7 FL (ref 37–54)
DEPRECATED RDW RBC AUTO: 41.8 FL (ref 37–54)
EGFRCR SERPLBLD CKD-EPI 2021: 38.6 ML/MIN/1.73
EGFRCR SERPLBLD CKD-EPI 2021: 40.6 ML/MIN/1.73
EOSINOPHIL # BLD AUTO: 0.08 10*3/MM3 (ref 0–0.4)
EOSINOPHIL # BLD AUTO: 0.34 10*3/MM3 (ref 0–0.4)
EOSINOPHIL NFR BLD AUTO: 0.4 % (ref 0.3–6.2)
EOSINOPHIL NFR BLD AUTO: 2.7 % (ref 0.3–6.2)
ERYTHROCYTE [DISTWIDTH] IN BLOOD BY AUTOMATED COUNT: 13.3 % (ref 12.3–15.4)
ERYTHROCYTE [DISTWIDTH] IN BLOOD BY AUTOMATED COUNT: 13.4 % (ref 12.3–15.4)
GLOBULIN UR ELPH-MCNC: 2.8 GM/DL
GLOBULIN UR ELPH-MCNC: 3 GM/DL
GLUCOSE BLDC GLUCOMTR-MCNC: 164 MG/DL (ref 70–130)
GLUCOSE BLDC GLUCOMTR-MCNC: 284 MG/DL (ref 70–130)
GLUCOSE SERPL-MCNC: 253 MG/DL (ref 65–99)
GLUCOSE SERPL-MCNC: 255 MG/DL (ref 65–99)
HCT VFR BLD AUTO: 39.5 % (ref 34–46.6)
HCT VFR BLD AUTO: 43.2 % (ref 34–46.6)
HGB BLD-MCNC: 13.2 G/DL (ref 12–15.9)
HGB BLD-MCNC: 14.6 G/DL (ref 12–15.9)
IMM GRANULOCYTES # BLD AUTO: 0.03 10*3/MM3 (ref 0–0.05)
IMM GRANULOCYTES # BLD AUTO: 0.09 10*3/MM3 (ref 0–0.05)
IMM GRANULOCYTES NFR BLD AUTO: 0.2 % (ref 0–0.5)
IMM GRANULOCYTES NFR BLD AUTO: 0.5 % (ref 0–0.5)
INR PPP: 1.23 (ref 0.84–1.13)
LYMPHOCYTES # BLD AUTO: 2.29 10*3/MM3 (ref 0.7–3.1)
LYMPHOCYTES # BLD AUTO: 2.49 10*3/MM3 (ref 0.7–3.1)
LYMPHOCYTES NFR BLD AUTO: 12.4 % (ref 19.6–45.3)
LYMPHOCYTES NFR BLD AUTO: 20.1 % (ref 19.6–45.3)
MCH RBC QN AUTO: 28.2 PG (ref 26.6–33)
MCH RBC QN AUTO: 29.1 PG (ref 26.6–33)
MCHC RBC AUTO-ENTMCNC: 33.4 G/DL (ref 31.5–35.7)
MCHC RBC AUTO-ENTMCNC: 33.8 G/DL (ref 31.5–35.7)
MCV RBC AUTO: 83.4 FL (ref 79–97)
MCV RBC AUTO: 87.2 FL (ref 79–97)
MONOCYTES # BLD AUTO: 0.7 10*3/MM3 (ref 0.1–0.9)
MONOCYTES # BLD AUTO: 0.78 10*3/MM3 (ref 0.1–0.9)
MONOCYTES NFR BLD AUTO: 3.8 % (ref 5–12)
MONOCYTES NFR BLD AUTO: 6.3 % (ref 5–12)
NEUTROPHILS NFR BLD AUTO: 15.27 10*3/MM3 (ref 1.7–7)
NEUTROPHILS NFR BLD AUTO: 70.1 % (ref 42.7–76)
NEUTROPHILS NFR BLD AUTO: 8.7 10*3/MM3 (ref 1.7–7)
NEUTROPHILS NFR BLD AUTO: 82.5 % (ref 42.7–76)
NRBC BLD AUTO-RTO: 0 /100 WBC (ref 0–0.2)
NRBC BLD AUTO-RTO: 0 /100 WBC (ref 0–0.2)
PLATELET # BLD AUTO: 487 10*3/MM3 (ref 140–450)
PLATELET # BLD AUTO: 496 10*3/MM3 (ref 140–450)
PMV BLD AUTO: 9.5 FL (ref 6–12)
PMV BLD AUTO: 9.7 FL (ref 6–12)
POTASSIUM SERPL-SCNC: 4.3 MMOL/L (ref 3.5–5.2)
POTASSIUM SERPL-SCNC: 4.9 MMOL/L (ref 3.5–5.2)
PROT SERPL-MCNC: 6.2 G/DL (ref 6–8.5)
PROT SERPL-MCNC: 6.3 G/DL (ref 6–8.5)
PROTHROMBIN TIME: 15.4 SECONDS (ref 11.4–14.4)
RBC # BLD AUTO: 4.53 10*6/MM3 (ref 3.77–5.28)
RBC # BLD AUTO: 5.18 10*6/MM3 (ref 3.77–5.28)
RH BLD: POSITIVE
SARS-COV-2 RDRP RESP QL NAA+PROBE: NORMAL
SODIUM SERPL-SCNC: 137 MMOL/L (ref 136–145)
SODIUM SERPL-SCNC: 137 MMOL/L (ref 136–145)
T&S EXPIRATION DATE: NORMAL
WBC NRBC COR # BLD: 12.41 10*3/MM3 (ref 3.4–10.8)
WBC NRBC COR # BLD: 18.5 10*3/MM3 (ref 3.4–10.8)

## 2022-05-24 PROCEDURE — 96360 HYDRATION IV INFUSION INIT: CPT

## 2022-05-24 PROCEDURE — 86901 BLOOD TYPING SEROLOGIC RH(D): CPT | Performed by: NURSE PRACTITIONER

## 2022-05-24 PROCEDURE — 99284 EMERGENCY DEPT VISIT MOD MDM: CPT

## 2022-05-24 PROCEDURE — 96361 HYDRATE IV INFUSION ADD-ON: CPT

## 2022-05-24 PROCEDURE — 87635 SARS-COV-2 COVID-19 AMP PRB: CPT | Performed by: OBSTETRICS & GYNECOLOGY

## 2022-05-24 PROCEDURE — C9803 HOPD COVID-19 SPEC COLLECT: HCPCS

## 2022-05-24 PROCEDURE — P9100 PATHOGEN TEST FOR PLATELETS: HCPCS

## 2022-05-24 PROCEDURE — 63710000001 INSULIN ISOPHANE HUMAN PER 5 UNITS: Performed by: STUDENT IN AN ORGANIZED HEALTH CARE EDUCATION/TRAINING PROGRAM

## 2022-05-24 PROCEDURE — 63710000001 INSULIN LISPRO (HUMAN) PER 5 UNITS: Performed by: STUDENT IN AN ORGANIZED HEALTH CARE EDUCATION/TRAINING PROGRAM

## 2022-05-24 PROCEDURE — 82962 GLUCOSE BLOOD TEST: CPT

## 2022-05-24 PROCEDURE — 80053 COMPREHEN METABOLIC PANEL: CPT | Performed by: NURSE PRACTITIONER

## 2022-05-24 PROCEDURE — 86900 BLOOD TYPING SEROLOGIC ABO: CPT | Performed by: NURSE PRACTITIONER

## 2022-05-24 PROCEDURE — 86850 RBC ANTIBODY SCREEN: CPT | Performed by: NURSE PRACTITIONER

## 2022-05-24 PROCEDURE — 99024 POSTOP FOLLOW-UP VISIT: CPT | Performed by: OBSTETRICS & GYNECOLOGY

## 2022-05-24 PROCEDURE — 85610 PROTHROMBIN TIME: CPT | Performed by: NURSE PRACTITIONER

## 2022-05-24 PROCEDURE — 85025 COMPLETE CBC W/AUTO DIFF WBC: CPT | Performed by: NURSE PRACTITIONER

## 2022-05-24 PROCEDURE — P9035 PLATELET PHERES LEUKOREDUCED: HCPCS

## 2022-05-24 PROCEDURE — 36430 TRANSFUSION BLD/BLD COMPNT: CPT

## 2022-05-24 RX ORDER — SODIUM CHLORIDE 9 MG/ML
100 INJECTION, SOLUTION INTRAVENOUS CONTINUOUS
Status: DISCONTINUED | OUTPATIENT
Start: 2022-05-24 | End: 2022-05-25

## 2022-05-24 RX ORDER — SODIUM CHLORIDE 0.9 % (FLUSH) 0.9 %
10 SYRINGE (ML) INJECTION AS NEEDED
Status: DISCONTINUED | OUTPATIENT
Start: 2022-05-24 | End: 2022-05-25 | Stop reason: HOSPADM

## 2022-05-24 RX ORDER — DOCUSATE SODIUM 100 MG/1
100 CAPSULE, LIQUID FILLED ORAL 2 TIMES DAILY PRN
Status: DISCONTINUED | OUTPATIENT
Start: 2022-05-24 | End: 2022-05-25 | Stop reason: HOSPADM

## 2022-05-24 RX ORDER — UBIDECARENONE 75 MG
250 CAPSULE ORAL EVERY MORNING
Status: DISCONTINUED | OUTPATIENT
Start: 2022-05-25 | End: 2022-05-25 | Stop reason: HOSPADM

## 2022-05-24 RX ORDER — FAMOTIDINE 20 MG/1
40 TABLET, FILM COATED ORAL DAILY PRN
Status: DISCONTINUED | OUTPATIENT
Start: 2022-05-24 | End: 2022-05-25 | Stop reason: HOSPADM

## 2022-05-24 RX ORDER — ACETAMINOPHEN 325 MG/1
650 TABLET ORAL EVERY 4 HOURS PRN
Status: DISCONTINUED | OUTPATIENT
Start: 2022-05-24 | End: 2022-05-25 | Stop reason: HOSPADM

## 2022-05-24 RX ORDER — DIPHENOXYLATE HYDROCHLORIDE AND ATROPINE SULFATE 2.5; .025 MG/1; MG/1
1 TABLET ORAL DAILY
Status: DISCONTINUED | OUTPATIENT
Start: 2022-05-24 | End: 2022-05-25 | Stop reason: HOSPADM

## 2022-05-24 RX ORDER — ROSUVASTATIN CALCIUM 20 MG/1
40 TABLET, COATED ORAL NIGHTLY
Status: DISCONTINUED | OUTPATIENT
Start: 2022-05-24 | End: 2022-05-25 | Stop reason: HOSPADM

## 2022-05-24 RX ORDER — SODIUM CHLORIDE 0.9 % (FLUSH) 0.9 %
10 SYRINGE (ML) INJECTION EVERY 12 HOURS SCHEDULED
Status: DISCONTINUED | OUTPATIENT
Start: 2022-05-24 | End: 2022-05-25 | Stop reason: HOSPADM

## 2022-05-24 RX ORDER — MELATONIN
1000 DAILY
Status: DISCONTINUED | OUTPATIENT
Start: 2022-05-24 | End: 2022-05-25 | Stop reason: HOSPADM

## 2022-05-24 RX ORDER — DEXTROSE MONOHYDRATE 25 G/50ML
25 INJECTION, SOLUTION INTRAVENOUS
Status: DISCONTINUED | OUTPATIENT
Start: 2022-05-24 | End: 2022-05-25 | Stop reason: HOSPADM

## 2022-05-24 RX ORDER — NICOTINE POLACRILEX 4 MG
15 LOZENGE BUCCAL
Status: DISCONTINUED | OUTPATIENT
Start: 2022-05-24 | End: 2022-05-25 | Stop reason: HOSPADM

## 2022-05-24 RX ORDER — BRIMONIDINE TARTRATE 2 MG/ML
1 SOLUTION/ DROPS OPHTHALMIC DAILY PRN
Status: DISCONTINUED | OUTPATIENT
Start: 2022-05-24 | End: 2022-05-25 | Stop reason: HOSPADM

## 2022-05-24 RX ORDER — INSULIN LISPRO 100 [IU]/ML
0-7 INJECTION, SOLUTION INTRAVENOUS; SUBCUTANEOUS
Status: DISCONTINUED | OUTPATIENT
Start: 2022-05-24 | End: 2022-05-25 | Stop reason: HOSPADM

## 2022-05-24 RX ORDER — CALCIUM CARBONATE 200(500)MG
1 TABLET,CHEWABLE ORAL 2 TIMES DAILY PRN
Status: DISCONTINUED | OUTPATIENT
Start: 2022-05-24 | End: 2022-05-25 | Stop reason: HOSPADM

## 2022-05-24 RX ORDER — LEVOTHYROXINE SODIUM 0.15 MG/1
150 TABLET ORAL DAILY
Status: DISCONTINUED | OUTPATIENT
Start: 2022-05-24 | End: 2022-05-25 | Stop reason: HOSPADM

## 2022-05-24 RX ORDER — POLYETHYLENE GLYCOL 3350 17 G/17G
17 POWDER, FOR SOLUTION ORAL DAILY PRN
Status: DISCONTINUED | OUTPATIENT
Start: 2022-05-24 | End: 2022-05-25 | Stop reason: HOSPADM

## 2022-05-24 RX ORDER — ONDANSETRON 2 MG/ML
4 INJECTION INTRAMUSCULAR; INTRAVENOUS EVERY 6 HOURS PRN
Status: DISCONTINUED | OUTPATIENT
Start: 2022-05-24 | End: 2022-05-25 | Stop reason: HOSPADM

## 2022-05-24 RX ADMIN — INSULIN HUMAN 25 UNITS: 100 INJECTION, SUSPENSION SUBCUTANEOUS at 18:36

## 2022-05-24 RX ADMIN — SODIUM CHLORIDE 100 ML/HR: 9 INJECTION, SOLUTION INTRAVENOUS at 15:04

## 2022-05-24 RX ADMIN — INSULIN LISPRO 4 UNITS: 100 INJECTION, SOLUTION INTRAVENOUS; SUBCUTANEOUS at 18:38

## 2022-05-24 RX ADMIN — ROSUVASTATIN 40 MG: 20 TABLET, FILM COATED ORAL at 22:07

## 2022-05-24 NOTE — ED PROVIDER NOTES
"Subjective   Pleasant patient presents the ER with sudden onset of vaginal bleeding around 5 AM.  Patient reports having a hysterectomy at the end of April by Dr. Paula.  She reports having a recent follow-up last week and everything was reassuring.  Patient has no pain.  She has no chest pain no abdominal pain no fever.  She would bed feeling fine and woke up with vaginal bleeding.      Vaginal Pain  Location:  Vaginal bleed  Severity:  Moderate  Onset quality:  Sudden  Duration:  3 hours  Timing:  Constant  Progression:  Unchanged  Chronicity:  New  Relieved by:  Rest  Worsened by:  Movement  Associated symptoms: no abdominal pain, no chest pain, no congestion, no cough, no diarrhea, no fever, no nausea, no shortness of breath, no sore throat, no vomiting and no wheezing        Review of Systems   Constitutional: Negative for chills, diaphoresis and fever.   HENT: Negative for congestion and sore throat.    Respiratory: Negative for cough, choking, chest tightness, shortness of breath and wheezing.    Cardiovascular: Negative for chest pain and leg swelling.   Gastrointestinal: Negative for abdominal distention, abdominal pain, anal bleeding, blood in stool, constipation, diarrhea, nausea and vomiting.   Genitourinary: Positive for vaginal pain. Negative for difficulty urinating, dysuria, flank pain, frequency, hematuria and urgency.   All other systems reviewed and are negative.      Past Medical History:   Diagnosis Date   • Abnormal CXR     worsening chronic changes, see report   • CKD (chronic kidney disease), stage III ([facility])     BUN/Cr 21/1.40 gfr 38  followed by nephrology   • Coronary artery disease     s/p stenting 2008, on ASA/Plavix, followed by Dr. Buck   • Diabetes mellitus (HCC) 1990    Dx 30+ yrs ago, thinks insulin last 15+ years.  \"I have not been a good diabetic\" - says didn't check her sugars for years.  A1c >9   • Diabetic peripheral neuropathy (HCC)    • Diastasis of rectus abdominis     " massive   • Dyslipidemia    • Dyspepsia     rare. no QUEENIE sx's or RUQ pains.  serum h. pyl neg   • Dyspnea on exertion    • Fatigue    • Gangrene (HCC)     (R) 5th toe amputated   • Gout     recently started on allopurinol but doesn't know why   • History of DVT (deep vein thrombosis)     years ago, etiology unclear, tx w/ Warfarin   • Hypertension    • Hypothyroidism    • Leukocytosis     13.98, asx   • Morbid obesity (HCC)    • Peripheral edema    • PVD (peripheral vascular disease) (HCC)     w/ cellulitis of RLE 1/2017 tx w/ Vanc/Rocephin   • Type 2 diabetes mellitus (HCC)    • Venous stasis dermatitis     R>L, with phelbitis s/p Rocephin/Flagyl 1/17   • Ventral hernia     periumbilical assoc w large rectus diastasis, chronic incarc   • Vitamin D deficiency    • Wears glasses        Allergies   Allergen Reactions   • Amoxicillin Rash     can tolerate Rocephin       Past Surgical History:   Procedure Laterality Date   • BREAST BIOPSY     • CATARACT EXTRACTION, BILATERAL Bilateral    • COLONOSCOPY  2015   • CORONARY ANGIOPLASTY WITH STENT PLACEMENT  2008   • D & C HYSTEROSCOPY      x2   • GASTRIC SLEEVE LAPAROSCOPIC N/A 06/02/2017    Procedure: GASTRIC SLEEVE LAPAROSCOPIC;  Surgeon: Tiago Obregon MD;  Location:  [facility];  Service:    • TOE AMPUTATION Right 2015    5th toe, d/t gangrene   • TONSILLECTOMY  1967   • TOTAL LAPAROSCOPIC HYSTERECTOMY SALPINGO OOPHORECTOMY N/A 4/29/2022    Procedure: TOTAL LAPAROSCOPIC HYSTERECTOMY BILATERAL SALPINGOOPHORECTOMY, INJECTION FOR SENTINEL LYMPH NODE INJECTION, BILATERAL PELVIC SENTINEL LYMPH NODE DISSECTION  WITH UMBILICAL HERNIA REPAIR WITH DAVINCI ROBOT;  Surgeon: Jayshree Paula MD;  Location:  [facility];  Service: Robotics - DaVinci;  Laterality: N/A;       Family History   Problem Relation Age of Onset   • Hypertension Mother    • Macular degeneration Mother    • Other Mother         DYSLIPIDEMIA   • Leukemia Father    • Hypertension Father    • Diabetes Sister     • Stroke Sister    • Hypertension Sister    • Coronary artery disease Sister    • Heart disease Maternal Grandfather        Social History     Socioeconomic History   • Marital status:    • Number of children: 2   Tobacco Use   • Smoking status: Never Smoker   • Smokeless tobacco: Never Used   Vaping Use   • Vaping Use: Never used   Substance and Sexual Activity   • Alcohol use: No   • Drug use: No   • Sexual activity: Defer           Objective   Physical Exam  Exam conducted with a chaperone present.   Constitutional:       Appearance: She is well-developed.   HENT:      Head: Normocephalic and atraumatic.      Right Ear: External ear normal.      Left Ear: External ear normal.      Nose: Nose normal.   Eyes:      Conjunctiva/sclera: Conjunctivae normal.      Pupils: Pupils are equal, round, and reactive to light.   Cardiovascular:      Rate and Rhythm: Normal rate and regular rhythm.      Heart sounds: Normal heart sounds.   Pulmonary:      Effort: Pulmonary effort is normal.      Breath sounds: Normal breath sounds.   Abdominal:      General: Bowel sounds are normal.      Palpations: Abdomen is soft.   Genitourinary:     Vagina: Bleeding present.      Comments: Significant amount of bleeding with clots.  Musculoskeletal:         General: Normal range of motion.      Cervical back: Normal range of motion and neck supple.   Skin:     General: Skin is warm and dry.   Neurological:      Mental Status: She is alert and oriented to person, place, and time.   Psychiatric:         Behavior: Behavior normal.         Thought Content: Thought content normal.         Judgment: Judgment normal.         Procedures           ED Course  ED Course as of 05/24/22 1217   Tue May 24, 2022   0757 Patient currently resting comfortably.  No abdominal pain.  Labs pending.  Vital signs stable.  I discussed the plan of care with the patient and the family and they were agreeable. [JM]   3748 Dr. Chai davies. [JM]   9775 Patient  still having bleeding through the packing.  I spoke with Dr. Lua and we will admit her to 5 N.  I have ordered another CBC.  Signs currently stable while laying. [JM]   0844 Spoke with Dr. Paula who will speak with Dr. Jerome. I spoke with Dr. Jerome who will come to the ED to see the pt. I have ordered Monsel solution, vagial packing and siver nitrate. [JM]   0932 I spoke with Dr. Jerome who evaluated the patient in the ER and performed vaginal packing.  The patient prefers to go home and this was DW Dr. Jerome.  If the patient does not bleed after several hours of observation then she may be safe to discharge home.  We will also check orthostatics and repeat labs if she has continued symptoms but if her bleeding stabilizes over the next several hours she can be discharged home with follow-up with Dr. Diaz. [JM]   1007 Will re-evaluate for possible dispo at Noon. [JM]      ED Course User Index  [JM] Abimael Whalen APRN                                                 [facility]    Final diagnoses:   Excessive vaginal bleeding   Anticoagulated   S/P hysterectomy       ED Disposition  ED Disposition     ED Disposition   Decision to Admit    Condition   --    Comment   Level of Care: Med/Surg [1]   Admitting Physician: QUIRINO JEROME [[id number]]   Bed Request Comments: 5N               No follow-up provider specified.       Medication List      No changes were made to your prescriptions during this visit.          Abimael Whalen APRN  05/24/22 0211

## 2022-05-24 NOTE — PLAN OF CARE
Goal Outcome Evaluation:  Plan of Care Reviewed With: (P) patient        Progress: (P) improving   Calm/cooperative. Vaginal packing replaced 3 times, 3 pads replaced. Fluids given per MD order. VSS.

## 2022-05-24 NOTE — CASE MANAGEMENT/SOCIAL WORK
Discharge Planning Assessment  [facility]     Patient Name: Nuris Ribeiro  MRN: [id number]  Today's Date: 5/24/2022    Admit Date: 5/24/2022     Discharge Needs Assessment     Row Name 05/24/22 1329       Living Environment    People in Home spouse    Current Living Arrangements home    Primary Care Provided by self    Provides Primary Care For no one    Family Caregiver if Needed spouse    Able to Return to Prior Arrangements yes       Resource/Environmental Concerns    Resource/Environmental Concerns none       Transition Planning    Patient/Family Anticipates Transition to home with family    Patient/Family Anticipated Services at Transition     Transportation Anticipated family or friend will provide       Discharge Needs Assessment    Readmission Within the Last 30 Days no previous admission in last 30 days    Equipment Currently Used at Home none    Concerns to be Addressed no discharge needs identified;denies needs/concerns at this time    Anticipated Changes Related to Illness none    Equipment Needed After Discharge none               Discharge Plan     Row Name 05/24/22 2104       Plan    Plan Home    Patient/Family in Agreement with Plan yes    Plan Comments Met & spoke with Mrs Ribeiro at the bedside. She lives with her spouse in [facility]. At baseline, is ind with ADL's/mobility. Denies DME/HH/Rehab/O2. No POA/living will. No needs voiced. Plan is home and spouse will transport.    Final Discharge Disposition Code 01 - home or self-care              Continued Care and Services - Admitted Since 5/24/2022    Coordination has not been started for this encounter.          Demographic Summary     Row Name 05/24/22 1320       General Information    General Information Comments Verified PCP is Dr Milan. Primary insurance is Medicare A/B. Secondary is [employer]. Has drug coverage.       Contact Information    Permission Granted to Share Info With     Contact Information Obtained  for                Functional Status     Row Name 05/24/22 1329       Functional Status    Usual Activity Tolerance good    Current Activity Tolerance good       Functional Status, IADL    Medications independent    Meal Preparation independent    Housekeeping independent    Laundry independent    Shopping independent               Psychosocial    No documentation.                Abuse/Neglect    No documentation.                Legal    No documentation.                Substance Abuse    No documentation.                Patient Forms    No documentation.                   Roma Monsalve RN

## 2022-05-24 NOTE — PROGRESS NOTES
I was called to evaluate Ms. Ribeiro in the emergency room due to vaginal bleeding.  She is status post robotic assisted total laparoscopic hysterectomy, bilateral salpingo-oophorectomy, and and bilateral pelvic sentinel lymph node dissection with umbilical hernia repair on 4/29/2022.  She was seen by Dr. Paula in the office 5/19/2022 for routine postoperative visit.  At that time, vaginal cuff was intact and no bleeding was identified.    Patient came to the emergency room today with complaints of vaginal bleeding.  She reports that is heavy.  She notes some mild dizziness.  She is on aspirin and Plavix for history of cardiac stent greater than 6 months ago.  On examination, there was blood at the perineum that was cleared.  On speculum examination, active bleeding was noted at the vaginal cuff.  Monsel's and packing was applied.    Patient strongly desires discharge home if possible.  I told her if she was presyncopal (dizzy) she could not be discharged home.  She then denied ongoing dizziness.  I told her if she was having bleeding through the vaginal packing she would need to be admitted.  Plan was made for 2 hours observation in the emergency room.  I discussed this with the PA, Abimael, who is taking care of her.  If there is any question regarding her safety, patient should be admitted to 5 [address]. under me as attending.    She is to follow-up 5/25/2022 with me/Chai for removal of vaginal packing.  Precautions were reviewed and she is to discontinue her aspirin and Plavix.  Electronically signed by Doris Jerome MD, 05/24/22, 9:46 AM EDT.            .

## 2022-05-24 NOTE — H&P
Patient Name: Nuris Ribeiro  : 1950   MRN: [id number]     Date of Admission: 22    Admission Diagnosis:   1. Heavy Vaginal Bleeding   2. POD #28 s/p robotic assisted total laparoscopic hysterectomy, bilateral salpingo-oophorectomy, and and bilateral pelvic sentinel lymph node dissection with umbilical hernia repair 2022.   3.  Antiplatelet therapy with aspirin and Plavix    History of Present Illness: Nuris Ribeiro is a 71 y.o. female who presents with heavy vaginal bleeding. She underwent surgery with Dr. Paula for complex atypical endometrial hyperplasia, as stated above. She states that she was suddenly woken this morning at 0500, with blood soaked all over her bed sheets. She stated that she could feel gushes of blood at that time, but was otherwise asymptomatic. Her  immediately brought her to the [facility] ED for further evaluation. Denies any vaginal pain, vaginal discharge (aside from bleeding), fevers/chills, SOB, chest pain, N/V, abdominal pain, dysuria, urinary incontinence, hematuria, constipation, or hematochezia. She has been able to void and have bowel movements. On ROS, only positive for orthostatic dizziness.     She denies any history of trauma, heavy lifting, straining, or sexual intercourse since surgery. Her activity has been relating to walking.  She reports that she returned to work yesterday, only for a half day. 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 No labor or physical effort is required. She was doing well at her postoperative visit with Dr. Paula, last seen .     Her medication list is significant for Plavix and ASA, which she last took on  AM. She last had PO intake around midnight .     Oncologic History:  Oncology/Hematology History   Complex atypical endometrial hyperplasia   2022 Initial Diagnosis    Referred by Stella Chakraborty MD    Patient presented for postmenopausal bleeding despite  "Provera.    2/7/2022: TVUS with uterus with multiple fibroids.  Largest of which measures 2.7 cm in maximal dimension.  Endometrial stripe thickened with a 23 x 13 x 17 mm hypoechoic area.  Bilateral ovaries appear normal.  3/30/2022: Hysteroscopy with D&C with at least complex atypical hyperplasia suspicious for endometrial adenocarcinoma          Past Medical History:   Past Medical History:   Diagnosis Date    Abnormal CXR     worsening chronic changes, see report    CKD (chronic kidney disease), stage III (HCC)     BUN/Cr 21/1.40 gfr 38  followed by nephrology    Coronary artery disease     s/p stenting 2008, on ASA/Plavix, followed by Dr. Buck    Diabetes mellitus (HCC) 1990    Dx 30+ yrs ago, thinks insulin last 15+ years.  \"I have not been a good diabetic\" - says didn't check her sugars for years.  A1c >9    Diabetic peripheral neuropathy (HCC)     Diastasis of rectus abdominis     massive    Dyslipidemia     Dyspepsia     rare. no QUEENIE sx's or RUQ pains.  serum h. pyl neg    Dyspnea on exertion     Fatigue     Gangrene (HCC)     (R) 5th toe amputated    Gout     recently started on allopurinol but doesn't know why    History of DVT (deep vein thrombosis)     years ago, etiology unclear, tx w/ Warfarin    Hypertension     Hypothyroidism     Leukocytosis     13.98, asx    Morbid obesity (HCC)     Peripheral edema     PVD (peripheral vascular disease) (HCC)     w/ cellulitis of RLE 1/2017 tx w/ Vanc/Rocephin    Type 2 diabetes mellitus (HCC)     Venous stasis dermatitis     R>L, with phelbitis s/p Rocephin/Flagyl 1/17    Ventral hernia     periumbilical assoc w large rectus diastasis, chronic incarc    Vitamin D deficiency     Wears glasses        Past Surgical History:   Past Surgical History:   Procedure Laterality Date    BREAST BIOPSY      CATARACT EXTRACTION, BILATERAL Bilateral     COLONOSCOPY  2015    CORONARY ANGIOPLASTY WITH STENT PLACEMENT  2008    D & C HYSTEROSCOPY      x2    GASTRIC SLEEVE " LAPAROSCOPIC N/A 2017    Procedure: GASTRIC SLEEVE LAPAROSCOPIC;  Surgeon: Tiago Obregon MD;  Location:  [facility];  Service:     TOE AMPUTATION Right 2015    5th toe, d/t gangrene    TONSILLECTOMY  1967    TOTAL LAPAROSCOPIC HYSTERECTOMY SALPINGO OOPHORECTOMY N/A 2022    Procedure: TOTAL LAPAROSCOPIC HYSTERECTOMY BILATERAL SALPINGOOPHORECTOMY, INJECTION FOR SENTINEL LYMPH NODE INJECTION, BILATERAL PELVIC SENTINEL LYMPH NODE DISSECTION  WITH UMBILICAL HERNIA REPAIR WITH DAVINCI ROBOT;  Surgeon: Jayshree Paula MD;  Location:  [facility];  Service: Robotics - DaVinci;  Laterality: N/A;       Family History:   Family History   Problem Relation Age of Onset    Hypertension Mother     Macular degeneration Mother     Other Mother         DYSLIPIDEMIA    Leukemia Father     Hypertension Father     Diabetes Sister     Stroke Sister     Hypertension Sister     Coronary artery disease Sister     Heart disease Maternal Grandfather        Social History:   Social History     Socioeconomic History    Marital status:     Number of children: 2   Tobacco Use    Smoking status: Never Smoker    Smokeless tobacco: Never Used   Vaping Use    Vaping Use: Never used   Substance and Sexual Activity    Alcohol use: No    Drug use: No    Sexual activity: Defer       OB History:   OB History    Para Term  AB Living   3 2 2   1 2   SAB IAB Ectopic Molar Multiple Live Births   1         2      # Outcome Date GA Lbr Lew/2nd Weight Sex Delivery Anes PTL Lv   3 SAB            2 Term      Vag-Spont   AYDEE   1 Term      Vag-Spont   AYDEE        Medications:     Current Facility-Administered Medications:     acetaminophen (TYLENOL) tablet 650 mg, 650 mg, Oral, Q4H PRN, Mckenna Singh MD    brimonidine (ALPHAGAN) 0.2 % ophthalmic solution 1 drop, 1 drop, Both Eyes, Daily PRN, Mckenna Singh MD    calcium carbonate (TUMS) chewable tablet 500 mg (200 mg elemental), 1 tablet, Oral, BID PRN, Francisco  MD Mckenna    cholecalciferol (VITAMIN D3) tablet 1,000 Units, 1,000 Units, Oral, Daily, Mckenna Singh MD    dextrose (D50W) (25 g/50 mL) IV injection 25 g, 25 g, Intravenous, Q15 Min PRN, Mckenna Singh MD    dextrose (GLUTOSE) oral gel 15 g, 15 g, Oral, Q15 Min PRN, Mckenna Singh MD    docusate sodium (COLACE) capsule 100 mg, 100 mg, Oral, BID PRN, Mckenna Singh MD    famotidine (PEPCID) tablet 40 mg, 40 mg, Oral, Daily PRN, Mckenna Singh MD    ferric subsulfate (ASTRINGYN) solution, , Topical, BID PRN, Mckenna Singh MD    glucagon (human recombinant) (GLUCAGEN DIAGNOSTIC) injection 1 mg, 1 mg, Intramuscular, Q15 Min PRN, Mckenna Singh MD    Insulin Lispro (humaLOG) injection 0-7 Units, 0-7 Units, Subcutaneous, TID AC, Mckenna Singh MD    levothyroxine (SYNTHROID, LEVOTHROID) tablet 150 mcg, 150 mcg, Oral, Daily, Mckenna Singh MD    multivitamin (THERAGRAN) tablet 1 tablet, 1 tablet, Oral, Daily, Mckenna Singh MD    ondansetron (ZOFRAN) injection 4 mg, 4 mg, Intravenous, Q6H PRN, Mckenna Singh MD    polyethylene glycol (MIRALAX) packet 17 g, 17 g, Oral, Daily PRN, Mckenna Singh MD    rosuvastatin (CRESTOR) tablet 40 mg, 40 mg, Oral, Nightly, Mckenna Singh MD    silver nitrate 75-25 % applicator 1 application, 1 application, Topical, Once, Mckenna Singh MD    [COMPLETED] Insert peripheral IV, , , Once **AND** sodium chloride 0.9 % flush 10 mL, 10 mL, Intravenous, PRN, Abimael Whalen APRN    sodium chloride 0.9 % flush 10 mL, 10 mL, Intravenous, Q12H, Mckenna Singh MD    sodium chloride 0.9 % flush 10 mL, 10 mL, Intravenous, PRN, Mckenna Singh MD    sodium chloride 0.9 % infusion, 100 mL/hr, Intravenous, Continuous, Mckenna Singh MD    [START ON 5/25/2022] vitamin B-12 (CYANOCOBALAMIN) tablet 250 mcg, 250 mcg, Oral, QAM, Mckenna Singh MD    Allergies:   Allergies   Allergen Reactions    Amoxicillin Rash     can  tolerate Rocephin     Review of Systems:   See as stated above in 10 pt ROS in HPI.      Physical Exam:  Vital Signs:   Vitals:    05/24/22 1337 05/24/22 1340 05/24/22 1422 05/24/22 1501   BP: 92/59 (!) 68/47 117/65 129/75   BP Location: Right arm Right arm     Patient Position: Sitting Standing     Pulse: 89 106 81 77   Resp:   16 16   Temp:   98.1 °F (36.7 °C) 98 °F (36.7 °C)   TempSrc:   Oral Oral   SpO2: 95% 91% 98% 99%   Weight:       Height:         BMI: Body mass index is 34.97 kg/m².   Weight:   Wt Readings from Last 3 Encounters:   05/24/22 104 kg (230 lb)   05/19/22 105 kg (232 lb)   05/19/22 105 kg (232 lb)     Physical Exam  Chaperone present: vaginal packing in place with no active bleeding. Old dark red blood present on existing packing. No active bleeding noted on valsalva/straining manuevers.   Constitutional:       General: She is not in acute distress.     Appearance: Normal appearance. She is not ill-appearing.   Cardiovascular:      Rate and Rhythm: Normal rate and regular rhythm.      Pulses: Normal pulses.   Pulmonary:      Effort: Pulmonary effort is normal. No respiratory distress.      Breath sounds: Normal breath sounds.   Chest:      Chest wall: No tenderness.   Abdominal:      General: Abdomen is flat. Bowel sounds are normal. There is no distension.      Palpations: Abdomen is soft. There is no mass.      Tenderness: There is no abdominal tenderness. There is no guarding or rebound.      Comments: Surgical incisions appearing clean/dry/intact   Genitourinary:     General: Normal vulva.      Vagina: Vaginal discharge present.   Neurological:      Mental Status: She is alert.         Results:   Lab Results   Component Value Date    WBC 18.50 (H) 05/24/2022    HGB 13.2 05/24/2022    HCT 39.5 05/24/2022    MCV 87.2 05/24/2022     (H) 05/24/2022       Lab Results   Component Value Date    GLUCOSE 253 (H) 05/24/2022    BUN 17 05/24/2022    CREATININE 1.46 (H) 05/24/2022    EGFRIFNONA 47  (L) 10/18/2018    EGFRIFAFRI 56 (L) 06/14/2018    BCR 11.6 05/24/2022    K 4.3 05/24/2022    CO2 23.0 05/24/2022    CALCIUM 9.3 05/24/2022    PROTENTOTREF 6.9 06/14/2018    ALBUMIN 3.40 (L) 05/24/2022    LABIL2 1.3 (L) 06/14/2018    AST 21 05/24/2022    ALT 17 05/24/2022        Assessment/Plan:   Nuris Ribeiro is a 71 y.o. female POD #28 s/p robotic assisted total laparoscopic hysterectomy, bilateral salpingo-oophorectomy, and and bilateral pelvic sentinel lymph node dissection with umbilical hernia repair 4/29/2022. She presents with acute onset heavy vaginal bleeding with no history of trauma, strain, or acute inciting events. HD #1.    1. Postoperative acute vaginal bleeding  - surgery details listed above. POD #28. Last seen postop by Dr. Paula in office 5/19, when vaginal cuff was intact and no bleeding reported  - presented to the ED today with acute gushes of vaginal bleeding and associated lightheadedness  - PMH of cardiac stent with daily ASA and Plavix use, last taken 5/23 AM  - on presentation, speculum exam revealed active bleeding at the vaginal cuff.  Monsel's and packing was applied at 0930. Packing fell out due to heavy vaginal bleeding hours later, requiring repacking at 1400.   - Reassessment 1 hour and 3 hours later- patient stable with no further bleeding.   - currently AF VSS, normotensive, SpO2 % on RA  - Labs: W 12.4-18.5, Plt 496-487,  Hct 43-40 , Hgb 14.6-13.2 , PT 15.4, INR 1.23  - Plan:   - initially NPO, maintain on CLD and monitor symptoms overnight to determine if acute surgical intervention required    - Transfused 1 unit platelets @ 1500 5/24, overall tolerated well aside from transfusion-associated pruritis   - Hold ASA and clopidogrel and home antihypertensives as BP currently soft to normotensive   - maintain IVF resuscitation    - Vag packing in place. Plan to remove on HD #2 AM, and reassess for further bleeding.    - repeat CBC and coags in AM    2. Type 2 Diabetes  Mellitus  - last HbA1C 10.6 (4/27), on admission glucose 253  - FSBS/SSI ordered  - given prolonged NPO and CLD currently, will order insulin regimen as half of long acting basal NPH Insulin (ordered 25units BID before meals)    3. Hypothyroidism   - continue home levothyroxine 150 mcg    4. Routine hospital care  - CLD diet, IVF (NS @ 100 ml/hr), SCD for VTE PPx (hold ASA and Plavix)    5. Dispo:      - If bleeding remains stable/minimal overnight, plan to escalate diet tomorrow on HD #2, encourage ambulation, and reassess bleeding status. If no acute events, plan for discharge home with close outpatient follow up in 1-2 weeks.     Patient care discussed and performed with GYN oncology attending, Dr. Jerome.    Mckenna Singh MD   Resident Physician, PGY 2  Gynecologic Oncology     I saw and evaluated the patient. I agree with the findings and the plan of care as documented in the note.  Upon repeat evaluation of the patient around 5 PM, vaginal bleeding had resolved status post vaginal packing re-placed by MIKKI Menchaca.  Patient was resting comfortably.  She is asking about food and drink.  Will discontinue the every 6 hours scheduled labs as patient is stable at this point.  Okay to drink clear liquids.  Restart long-acting insulin at half dose.  Discontinue vaginal packing in a.m.  If patient remains stable, would anticipate advance to consistent carbohydrate diet and discharge home tomorrow.  She had questions about resuming work half days.  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  I told her I thought this would depend on her bleeding situation.  I think she should continue to hold her aspirin and Plavix for 1 to 2 weeks.    Doris Jerome MD  05/24/22  17:36 EDT

## 2022-05-24 NOTE — SIGNIFICANT NOTE
MD notified by nursing that vaginal packing spontaneously fell out while patient was urinating. Patient was amenable to re-packing vaginal vault. Upon bedside assessment, scant, dark red blood noted on pad, and no active bleeding noted to be originating from vagina.     Packing replaced at 1830. Vaginal packing coated with Monsel's AstrinGYN solution and gently replaced within vagina. Patient tolerated the procedure well.     Counseled that it is not abnormal for packing to fall out when voiding. Nursing instructed to maintain strict pad counts overnight.     Mckenna Singh MD   Resident Physician, PGY 2  Gynecologic Oncology

## 2022-05-25 VITALS
TEMPERATURE: 98.2 F | HEART RATE: 73 BPM | WEIGHT: 230 LBS | HEIGHT: 68 IN | OXYGEN SATURATION: 96 % | BODY MASS INDEX: 34.86 KG/M2 | RESPIRATION RATE: 18 BRPM | DIASTOLIC BLOOD PRESSURE: 69 MMHG | SYSTOLIC BLOOD PRESSURE: 128 MMHG

## 2022-05-25 PROBLEM — N93.9 EXCESSIVE VAGINAL BLEEDING: Status: ACTIVE | Noted: 2022-05-25

## 2022-05-25 PROBLEM — N93.9 EXCESSIVE VAGINAL BLEEDING: Status: RESOLVED | Noted: 2022-05-24 | Resolved: 2022-05-25

## 2022-05-25 LAB
APTT PPP: 25.4 SECONDS (ref 22–39)
BASOPHILS # BLD AUTO: 0.06 10*3/MM3 (ref 0–0.2)
BASOPHILS NFR BLD AUTO: 0.5 % (ref 0–1.5)
BH BB BLOOD EXPIRATION DATE: NORMAL
BH BB BLOOD TYPE BARCODE: 6200
BH BB DISPENSE STATUS: NORMAL
BH BB PRODUCT CODE: NORMAL
BH BB UNIT NUMBER: NORMAL
DEPRECATED RDW RBC AUTO: 42.5 FL (ref 37–54)
EOSINOPHIL # BLD AUTO: 0.38 10*3/MM3 (ref 0–0.4)
EOSINOPHIL NFR BLD AUTO: 2.9 % (ref 0.3–6.2)
ERYTHROCYTE [DISTWIDTH] IN BLOOD BY AUTOMATED COUNT: 13.8 % (ref 12.3–15.4)
FIBRINOGEN PPP-MCNC: 363 MG/DL (ref 220–470)
GLUCOSE BLDC GLUCOMTR-MCNC: 173 MG/DL (ref 70–130)
GLUCOSE BLDC GLUCOMTR-MCNC: 179 MG/DL (ref 70–130)
HCT VFR BLD AUTO: 33.2 % (ref 34–46.6)
HGB BLD-MCNC: 11.1 G/DL (ref 12–15.9)
IMM GRANULOCYTES # BLD AUTO: 0.05 10*3/MM3 (ref 0–0.05)
IMM GRANULOCYTES NFR BLD AUTO: 0.4 % (ref 0–0.5)
INR PPP: 1.18 (ref 0.84–1.13)
LYMPHOCYTES # BLD AUTO: 3.49 10*3/MM3 (ref 0.7–3.1)
LYMPHOCYTES NFR BLD AUTO: 26.8 % (ref 19.6–45.3)
MCH RBC QN AUTO: 29 PG (ref 26.6–33)
MCHC RBC AUTO-ENTMCNC: 33.4 G/DL (ref 31.5–35.7)
MCV RBC AUTO: 86.7 FL (ref 79–97)
MONOCYTES # BLD AUTO: 0.75 10*3/MM3 (ref 0.1–0.9)
MONOCYTES NFR BLD AUTO: 5.8 % (ref 5–12)
NEUTROPHILS NFR BLD AUTO: 63.6 % (ref 42.7–76)
NEUTROPHILS NFR BLD AUTO: 8.31 10*3/MM3 (ref 1.7–7)
NRBC BLD AUTO-RTO: 0 /100 WBC (ref 0–0.2)
PLATELET # BLD AUTO: 446 10*3/MM3 (ref 140–450)
PMV BLD AUTO: 9.5 FL (ref 6–12)
PROTHROMBIN TIME: 14.9 SECONDS (ref 11.4–14.4)
RBC # BLD AUTO: 3.83 10*6/MM3 (ref 3.77–5.28)
UNIT  ABO: NORMAL
UNIT  RH: NORMAL
WBC NRBC COR # BLD: 13.04 10*3/MM3 (ref 3.4–10.8)

## 2022-05-25 PROCEDURE — 85730 THROMBOPLASTIN TIME PARTIAL: CPT | Performed by: STUDENT IN AN ORGANIZED HEALTH CARE EDUCATION/TRAINING PROGRAM

## 2022-05-25 PROCEDURE — 63710000001 INSULIN LISPRO (HUMAN) PER 5 UNITS: Performed by: STUDENT IN AN ORGANIZED HEALTH CARE EDUCATION/TRAINING PROGRAM

## 2022-05-25 PROCEDURE — 82962 GLUCOSE BLOOD TEST: CPT

## 2022-05-25 PROCEDURE — G0378 HOSPITAL OBSERVATION PER HR: HCPCS

## 2022-05-25 PROCEDURE — 85610 PROTHROMBIN TIME: CPT | Performed by: STUDENT IN AN ORGANIZED HEALTH CARE EDUCATION/TRAINING PROGRAM

## 2022-05-25 PROCEDURE — 85384 FIBRINOGEN ACTIVITY: CPT | Performed by: STUDENT IN AN ORGANIZED HEALTH CARE EDUCATION/TRAINING PROGRAM

## 2022-05-25 PROCEDURE — 99024 POSTOP FOLLOW-UP VISIT: CPT | Performed by: OBSTETRICS & GYNECOLOGY

## 2022-05-25 PROCEDURE — 85025 COMPLETE CBC W/AUTO DIFF WBC: CPT | Performed by: STUDENT IN AN ORGANIZED HEALTH CARE EDUCATION/TRAINING PROGRAM

## 2022-05-25 RX ADMIN — SODIUM CHLORIDE 100 ML/HR: 9 INJECTION, SOLUTION INTRAVENOUS at 00:14

## 2022-05-25 RX ADMIN — Medication 10 ML: at 09:06

## 2022-05-25 RX ADMIN — VITAM B12 250 MCG: 100 TAB at 06:28

## 2022-05-25 RX ADMIN — MULTIVITAMIN TABLET 1 TABLET: TABLET at 09:06

## 2022-05-25 RX ADMIN — LEVOTHYROXINE SODIUM 150 MCG: 150 TABLET ORAL at 09:05

## 2022-05-25 RX ADMIN — INSULIN LISPRO 2 UNITS: 100 INJECTION, SOLUTION INTRAVENOUS; SUBCUTANEOUS at 09:05

## 2022-05-25 RX ADMIN — INSULIN HUMAN 25 UNITS: 100 INJECTION, SUSPENSION SUBCUTANEOUS at 09:06

## 2022-05-25 RX ADMIN — Medication 1000 UNITS: at 09:05

## 2022-05-25 NOTE — PLAN OF CARE
Goal Outcome Evaluation:  Plan of Care Reviewed With: patient        Progress: improving  Outcome Evaluation: VSS. No c/o pain. Minimal vaginal bleeding present at this time, during this shift no pads have been saturated. Pt slept well. UOP-WNL. IVF running. Anticipate possible d/c later today with F/U in MD office. Will continue to monitor.

## 2022-05-25 NOTE — PLAN OF CARE
Goal Outcome Evaluation:  Plan of Care Reviewed With: (P) patient, spouse        Progress: (P) improving   Calm/cooperative. Minimal spotting on pads. Adequate UOP. Tolerating diet. D/c teaching covered w/ patient. D/c @ 8524.

## 2022-05-25 NOTE — DISCHARGE SUMMARY
Inpatient Discharge Summary    BRIEF OVERVIEW  Admitting Provider: Doris Jerome MD  Discharge Provider: Jayshree Paula MD  Primary Care Physician at Discharge: Pa Milan [phone]     Admission Date: 5/24/2022     Discharge Date: 05/25/22    Discharge Diagnosis  1. Heavy Vaginal Postoperative Bleeding   2. POD #28 s/p robotic assisted total laparoscopic hysterectomy, bilateral salpingo-oophorectomy, and and bilateral pelvic sentinel lymph node dissection with umbilical hernia repair 4/29/2022.   3.  Antiplatelet therapy with aspirin and Plavix    Discharge Disposition  Home or Self Care  Code Status at Discharge: Full Code    Active Issues Requiring Follow-up  Vaginal Bleeding    Outpatient Follow-Up  Future Appointments   Date Time Provider Department Center   9/1/2022  9:00 AM Janay Jefferson PA [facility]       Additional Instructions for the Follow-ups that You Need to Schedule     Discharge Follow-up with Specialty: [facility]. Paula; 1 Week   As directed      Specialty: [facility] Dr. Paula    Follow Up: 1 Week         Discharge Follow-up with Specified Provider: GYN Onc - Dr. Jayshree Paula with [facility]; 1 Week   As directed      To: GYN Onc - Dr. Jayshree Paula with [facility]    Follow Up: 1 Week             Test Results Pending at Discharge  None    DETAILS OF HOSPITAL STAY    Presenting Problem/History of Present Illness  Excessive vaginal bleeding [N93.9]    Hospital Course  Nuris Ribeiro is a 71 y.o. female who presents with heavy vaginal bleeding. She underwent surgery with Dr. Paula for complex atypical endometrial hyperplasia, as stated above. She presented on 5/24 AM to [facility] [place] ED after suddenly being woken that morning with blood soaked all over her bed sheets. She stated that she could feel gushes of blood at that time, but was otherwise asymptomatic. Denies any vaginal pain, vaginal discharge (aside from  bleeding), fevers/chills, SOB, chest pain, N/V, abdominal pain, dysuria, urinary incontinence, hematuria, constipation, or hematochezia. She has been able to void and have bowel movements. On ROS, only positive for orthostatic dizziness.      She denied any history of trauma, heavy lifting, straining, or sexual intercourse since surgery. Her activity had only consisted of walking.  She was doing well at her postoperative visit with Dr. Paula, last seen 5/19.      Her medication list is significant for Plavix and ASA, which she last took on 5/23 AM. On day of hospitalization, on presentation, speculum exam revealed active bleeding at the vaginal cuff.  Monsel's and packing was applied at 0930. Packing fell out due to heavy vaginal bleeding hours later, requiring repacking at 1400. Throughout the evening and the next morning, the patient remained stable with no further bleeding. After initial episodes of heavy bleeding, she remained AF VSS, normotensive. Labs trended as stated below.     The patient was made initially NPO, transitioned to CLD overnight on HD #1, and advanced to regular/consistent carbohydrate diet on day of discharge. Symptoms were monitored overnight and no acute surgical intervention was deemed as required. The patient was transfused 1 unit platelets @ 1500 5/24, overall tolerated well aside from transfusion-associated pruritis.    On HD #2, at time of discharge, she was counseled to hold ASA for 1 week. She was counseled to restart her home plavix in 3 days, starting on Saturday, 5/28. She will follow up in outpatient clinic with Dr. Paula ([facility]) in 1 week.     Operative Procedures Performed - none    Treatments: IV hydration, anticoagulation: ASA and Plavix held during admission, insulin: NPH and therapies: transfused 1 unit of platelets  Consults: none  Procedures: none  Pertinent Test Results: labs: Hematocrit downtrend 43-39-33 , Platelet trend [id number]    Physical Exam at  "Discharge  Discharge Condition: good  Heart Rate: 76  Resp: 17  BP: 128/65  Temp: 98 °F (36.7 °C)  Weight: 104 kg (230 lb)  /65   Pulse 76   Temp 98 °F (36.7 °C) (Oral)   Resp 17   Ht 172.7 cm (68\")   Wt 104 kg (230 lb)   LMP 02/04/2022 (Exact Date)   SpO2 96%   BMI 34.97 kg/m²     General Appearance:    Alert, cooperative, no distress, appears stated age   Head:    Normocephalic, without obvious abnormality, atraumatic   Throat:   Lips, mucosa, and tongue normal; teeth and gums normal       Back:     Symmetric, no curvature, ROM normal, no CVA tenderness   Lungs:     Clear to auscultation bilaterally, respirations unlabored   Chest Wall:    No tenderness or deformity    Heart:    Regular rate and rhythm, S1 and S2 normal   Breast Exam:    No tenderness, masses, or nipple abnormality   Abdomen:     Soft, non-tender, bowel sounds active all four quadrants,     no masses, no organomegaly   Genitalia:    Normal female without lesion, discharge or tenderness   Rectal:    Normal tone, no masses or tenderness; guaiac negative stool   Extremities:   Extremities normal, atraumatic, no cyanosis or edema   Pulses:   2+ and symmetric all extremities   Skin:   Skin color, texture, turgor normal, no rashes or lesions       Neurologic:   CNII-XII intact, normal strength, sensation and reflexes     throughout     "

## 2022-05-26 ENCOUNTER — READMISSION MANAGEMENT (OUTPATIENT)
Dept: CALL CENTER | Facility: HOSPITAL | Age: 72
End: 2022-05-26

## 2022-05-26 NOTE — OUTREACH NOTE
Prep Survey    Flowsheet Row Responses   [facility] facility patient discharged from? [place]   Is LACE score < 7 ? No   Emergency Room discharge w/ pulse ox? No   Eligibility Readm Mgmt   Discharge diagnosis total laparoscopic hysterectomy, bilateral salpingo-oophorectomy, and and bilateral pelvic sentinel lymph node dissection with umbilical hernia repai   Does the patient have one of the following disease processes/diagnoses(primary or secondary)? General Surgery   Does the patient have Home health ordered? No   Is there a DME ordered? No   Prep survey completed? Yes          DAVID DILL - Registered Nurse

## 2022-06-02 ENCOUNTER — OFFICE VISIT (OUTPATIENT)
Dept: GYNECOLOGIC ONCOLOGY | Facility: CLINIC | Age: 72
End: 2022-06-02

## 2022-06-02 VITALS
HEIGHT: 68 IN | HEART RATE: 64 BPM | BODY MASS INDEX: 34.86 KG/M2 | DIASTOLIC BLOOD PRESSURE: 57 MMHG | RESPIRATION RATE: 20 BRPM | SYSTOLIC BLOOD PRESSURE: 125 MMHG | TEMPERATURE: 97.7 F | OXYGEN SATURATION: 98 % | WEIGHT: 230 LBS

## 2022-06-02 DIAGNOSIS — Z09 POSTOP CHECK: Primary | ICD-10-CM

## 2022-06-02 DIAGNOSIS — N93.9 VAGINAL BLEEDING: ICD-10-CM

## 2022-06-02 PROCEDURE — 99024 POSTOP FOLLOW-UP VISIT: CPT | Performed by: OBSTETRICS & GYNECOLOGY

## 2022-06-02 RX ORDER — CLOPIDOGREL BISULFATE 75 MG/1
TABLET ORAL
COMMUNITY
Start: 2022-05-31

## 2022-06-02 NOTE — PROGRESS NOTES
"     Post Operative Office Visit      Patient Name: Nuris Ribeiro  : 1950   MRN: [id number]     Chief Complaint:  Postoperative visit    History of Present Illness: Nuris Ribeiro is a 71 y.o. female who is status post robotic assisted total laparoscopic hysterectomy with bilateral salpingo-oophorectomy bilateral pelvic sentinel lymph node dissection on 2022 for complex atypical hyperplasia of the uterus.  She was doing well and was seen for her postoperative visit on 2022.  However after that she developed episode of heavy vaginal bleeding was seen in the emergency room.  She underwent vaginal packing but continued to have bleeding so was admitted overnight.  It was felt that the bleeding was related to her Plavix and aspirin which were discontinued.  Since her discharge she has not had any bleeding.  She started her Plavix back this weekend and started aspirin today.  She has been working half days this week is otherwise doing well.    Medical, surgical, and family history updated as needed.  Subjective      Review of Systems:   Review of Systems   Constitutional: Negative for activity change, fatigue and fever.   Respiratory: Negative for shortness of breath.    Cardiovascular: Negative for chest pain and leg swelling.   Gastrointestinal: Negative for abdominal pain, constipation, diarrhea, nausea and vomiting.   Genitourinary: Negative for vaginal bleeding and vaginal discharge.   Neurological: Negative for dizziness and light-headedness.        Objective     Physical Exam:  Vital Signs:   Vitals:    22 1102   BP: 125/57  Comment: LUE   Pulse: 64   Resp: 20   Temp: 97.7 °F (36.5 °C)   TempSrc: Infrared   SpO2: 98%  Comment: RA   Weight: 104 kg (230 lb)   Height: 172.7 cm (68\")   PainSc: 0-No pain     BMI: Body mass index is 34.97 kg/m².     Physical Exam  Vitals and nursing note reviewed. Exam conducted with a chaperone present.   Constitutional:       General: She is not in " acute distress.     Appearance: Normal appearance. She is well-developed. She is not diaphoretic.   HENT:      Head: Normocephalic and atraumatic.      Right Ear: External ear normal.      Left Ear: External ear normal.   Eyes:      General: No scleral icterus.        Right eye: No discharge.         Left eye: No discharge.      Conjunctiva/sclera: Conjunctivae normal.   Neck:      Thyroid: No thyromegaly.   Cardiovascular:      Rate and Rhythm: Normal rate.   Pulmonary:      Effort: Pulmonary effort is normal. No respiratory distress.   Abdominal:      General: There is no distension.      Palpations: Abdomen is soft. There is no mass.      Tenderness: There is no abdominal tenderness. There is no guarding.   Genitourinary:     Comments: External genitalia normal. Vagina without any discharge. Vaginal cuff intact. Uterus, cervix and adnexa surgically absent. Rectovaginal exam deferred.  Musculoskeletal:         General: No swelling, tenderness, deformity or signs of injury.      Cervical back: Neck supple.      Right lower leg: No edema.      Left lower leg: No edema.   Lymphadenopathy:      Cervical: No cervical adenopathy.   Skin:     General: Skin is warm and dry.      Coloration: Skin is not jaundiced.      Findings: No erythema, lesion or rash.   Neurological:      General: No focal deficit present.      Mental Status: She is alert and oriented to person, place, and time. Mental status is at baseline.      Motor: No abnormal muscle tone.   Psychiatric:         Behavior: Behavior normal.         Thought Content: Thought content normal.         Medications:     Current Outpatient Medications:   •  allopurinol (ZYLOPRIM) 100 MG tablet, Take 100 mg by mouth Daily., Disp: , Rfl:   •  Brimonidine Tartrate (Lumify) 0.025 % solution ophthalmic solution, Administer 1 drop to both eyes Daily., Disp: , Rfl:   •  cholecalciferol (VITAMIN D3) 1000 UNITS tablet, Take 1,000 Units by mouth Daily., Disp: , Rfl:   •   clopidogrel (PLAVIX) 75 MG tablet, , Disp: , Rfl:   •  Cyanocobalamin (VITAMIN B 12 PO), Take 2,500 mcg by mouth Every Morning., Disp: , Rfl:   •  empagliflozin (Jardiance) 25 MG tablet tablet, Take 1 tablet by mouth Daily., Disp: 90 tablet, Rfl: 2  •  ezetimibe (ZETIA) 10 MG tablet, Take 10 mg by mouth Daily., Disp: , Rfl:   •  hydrochlorothiazide (HYDRODIURIL) 12.5 MG tablet, Take 12.5 mg by mouth Daily., Disp: , Rfl:   •  insulin NPH (humuLIN N,novoLIN N) 100 UNIT/ML injection, Inject  under the skin into the appropriate area as directed 2 (Two) Times a Day Before Meals. novolin N - 57 units in am, 50 units in evening novolin R - 12 units (plus, sliding scale) three times daily with meals, Disp: , Rfl:   •  insulin regular (NovoLIN R) 100 UNIT/ML injection, 12 units TID with meals plus +2 units 200-249mg/dl +4 units 250-299 mg/dl +6 units 300-349 mg/dl +8 units >350mg/dl Scale, Disp: , Rfl: 12  •  levothyroxine (SYNTHROID, LEVOTHROID) 150 MCG tablet, Take 1 tablet by mouth Daily. NEW DOSE, Disp: 90 tablet, Rfl: 1  •  lisinopril (PRINIVIL,ZESTRIL) 2.5 MG tablet, Take 2.5 mg by mouth Daily., Disp: , Rfl:   •  Multiple Vitamin (MULTI VITAMIN PO), Take 1 tablet by mouth Daily., Disp: , Rfl:   •  rosuvastatin (CRESTOR) 40 MG tablet, Take 40 mg by mouth Every Night., Disp: , Rfl:   Current outpatient and discharge medications have been reconciled for the patient.  Reviewed by: Jayshree Paula MD      Allergies:   Allergies   Allergen Reactions   • Amoxicillin Rash     can tolerate Rocephin       Assessment / Plan    Nuris Ribeiro is s/p  robotic assisted total laparoscopic hysterectomy with bilateral salpingo-oophorectomy bilateral pelvic sentinel lymph node dissection on 4/29/2022 for complex atypical hyperplasia of the uterus and was subsequently admitted for vaginal bleeding.  Today she is healing well and her cuff is completely intact.  She has no vaginal bleeding today.  She is restarted her Plavix and  aspirin.  I discussed with the patient that her bleeding is unlikely to continue given how long her vaginal cuff is healing we did review bleeding precautions.  She was instructed to call office if any new spotting develops.  She verbalizes understanding.  Otherwise she can follow-up with her primary physician.  Physician     Diagnoses and all orders for this visit:    1. Postop check (Primary)    2. Vaginal bleeding         Follow Up: JOSE Paula MD  Gynecologic Oncology

## 2022-06-09 ENCOUNTER — TELEPHONE (OUTPATIENT)
Dept: ENDOCRINOLOGY | Facility: CLINIC | Age: 72
End: 2022-06-09

## 2022-06-10 ENCOUNTER — READMISSION MANAGEMENT (OUTPATIENT)
Dept: CALL CENTER | Facility: HOSPITAL | Age: 72
End: 2022-06-10

## 2022-06-10 NOTE — OUTREACH NOTE
General Surgery Week 3 Survey    Flowsheet Row Responses   [facility] patient discharged from? [place]   Does the patient have one of the following disease processes/diagnoses(primary or secondary)? General Surgery   Week 3 attempt successful? Yes   Call start time 1713   Rescheduled Rescheduled-pt requested  [ requested call back to patient. ]   Call end time 1713   General alerts for this patient Call patients cell phone # for follow up calls.    Person spoke with today (if not patient) and relationship Shai Ribeiro Spouse           ADDI DSOUZA - Registered Nurse

## 2022-08-08 ENCOUNTER — TELEPHONE (OUTPATIENT)
Dept: ENDOCRINOLOGY | Facility: CLINIC | Age: 72
End: 2022-08-08

## 2022-08-08 RX ORDER — BLOOD SUGAR DIAGNOSTIC
STRIP MISCELLANEOUS
Qty: 300 EACH | Refills: 0 | Status: SHIPPED | OUTPATIENT
Start: 2022-08-08 | End: 2022-08-09 | Stop reason: SDUPTHER

## 2022-08-09 RX ORDER — BLOOD SUGAR DIAGNOSTIC
STRIP MISCELLANEOUS
Qty: 300 EACH | Refills: 0 | Status: SHIPPED | OUTPATIENT
Start: 2022-08-09 | End: 2022-10-20

## 2022-08-09 NOTE — TELEPHONE ENCOUNTER
PATIENT IS CALLING STATING PHARMACY IS NEEDING DIAGNOSIS CODE ON THE PRESCRIPTION FOR TEST STRIPS. HER INSURANCE REQUIRES DIAGNOSIS CODE ON PRESCRIPTION. PHARMACY IS [facility] IN [place] PHONE NUMBER IS

## 2022-08-18 NOTE — PROGRESS NOTES
Continued Stay Note  [facility]     Patient Name: Nuris Ribeiro  MRN: [id number]  Today's Date: 6/4/2017    Admit Date: 6/2/2017          Discharge Plan       06/04/17 1545    Case Management/Social Work Plan    Additional Comments Met with pt at bedside to discuss assistance with insulin costs. Pt has a [employer] Part D plan. Pt is currently on Humalog and was supposed to also use Levemir, however, the cost was approx $2000, which was not affordable and therefore she was changed to 70/30 insulin and Humalog. Per MD, pt will be DCd on Humalog and Levemir. CM will provide pt with 1 month supply of Levemir and Humalog per indigent program.  Also provided pt with info for pt assistance programs for both Humalog and Levemir. Pt will need to contact these programs and enroll herself, if eligible. CM will cont to follow.              Discharge Codes     None        Expected Discharge Date and Time     Expected Discharge Date Expected Discharge Time    Jun 4, 2017             Poonam Deleon     05:30

## 2022-09-01 ENCOUNTER — OFFICE VISIT (OUTPATIENT)
Dept: ENDOCRINOLOGY | Facility: CLINIC | Age: 72
End: 2022-09-01

## 2022-09-01 VITALS
WEIGHT: 233 LBS | HEIGHT: 68 IN | SYSTOLIC BLOOD PRESSURE: 126 MMHG | OXYGEN SATURATION: 97 % | DIASTOLIC BLOOD PRESSURE: 77 MMHG | HEART RATE: 87 BPM | BODY MASS INDEX: 35.31 KG/M2

## 2022-09-01 DIAGNOSIS — E03.9 ACQUIRED HYPOTHYROIDISM: ICD-10-CM

## 2022-09-01 DIAGNOSIS — E11.65 UNCONTROLLED TYPE 2 DIABETES MELLITUS WITH HYPERGLYCEMIA: Primary | ICD-10-CM

## 2022-09-01 DIAGNOSIS — I10 PRIMARY HYPERTENSION: ICD-10-CM

## 2022-09-01 DIAGNOSIS — E11.42 TYPE 2 DIABETES MELLITUS WITH DIABETIC POLYNEUROPATHY, WITH LONG-TERM CURRENT USE OF INSULIN: ICD-10-CM

## 2022-09-01 DIAGNOSIS — Z79.4 TYPE 2 DIABETES MELLITUS WITH DIABETIC POLYNEUROPATHY, WITH LONG-TERM CURRENT USE OF INSULIN: ICD-10-CM

## 2022-09-01 LAB
EXPIRATION DATE: ABNORMAL
EXPIRATION DATE: NORMAL
GLUCOSE BLDC GLUCOMTR-MCNC: 257 MG/DL (ref 70–130)
HBA1C MFR BLD: 10.3 %
Lab: ABNORMAL
Lab: NORMAL

## 2022-09-01 PROCEDURE — 83036 HEMOGLOBIN GLYCOSYLATED A1C: CPT | Performed by: PHYSICIAN ASSISTANT

## 2022-09-01 PROCEDURE — 82947 ASSAY GLUCOSE BLOOD QUANT: CPT | Performed by: PHYSICIAN ASSISTANT

## 2022-09-01 PROCEDURE — 3046F HEMOGLOBIN A1C LEVEL >9.0%: CPT | Performed by: PHYSICIAN ASSISTANT

## 2022-09-01 PROCEDURE — 99214 OFFICE O/P EST MOD 30 MIN: CPT | Performed by: PHYSICIAN ASSISTANT

## 2022-09-01 NOTE — PROGRESS NOTES
Office Note      Date: 2022  Patient Name: Nuris Ribeiro  MRN: [id number]  : 1950    Chief Complaint   Patient presents with   • Diabetes   • Thyroid Problem       History of Present Illness:   Nuris Ribeiro is a 71 y.o. female who presents for follow-up for type 2 diabetes.  She reports it has been a hectic few months.  Her daughter's  passed away suddenly in July and they have 3 young boys.  She reports she was staying with her in [place] for few weeks and she and her  have been going back and forth.  She reports she has not been taking care of her blood sugars like she should.  She remains on Jardiance 25 mg daily and Novolin in 52 units in the morning and 50 units in the evening she reports she did not realize she was supposed to increase her dose last visit.  She remains on Novolin R 12 units plus her sliding scale for blood glucose is elevated.  She reports she bought the Jardiance recently but this is very expensive for her.  She continues to check her blood sugar 3 times daily.  She has had a few blood sugars in the 70s but denies any severe hypoglycemia.  She reports she has been snacking a lot.  We tried to start Ozempic in the past but this was too costly.  She reports she is up-to-date on her eye exam she goes regularly every 6 months.  She sees the podiatrist regularly every 2 to 3 months she denies any trouble with her feet today.  We reduced her levothyroxine dose after her appointment in May to 150 mcg daily.  She reports she is taking this regularly and correctly.  She reports she feels okay from a thyroid standpoint.      Subjective     Review of Systems:   Review of Systems   Constitutional: Negative.    Cardiovascular: Negative.    Gastrointestinal: Negative.    Endocrine: Negative.    Neurological: Negative.        The following portions of the patient's history were reviewed and updated as appropriate: allergies, current medications, past family  "history, past medical history, past social history, past surgical history and problem list.    Objective     Vitals:    09/01/22 0903   BP: 126/77   Pulse: 87   SpO2: 97%   Weight: 106 kg (233 lb)   Height: 172.7 cm (68\")     Body mass index is 35.43 kg/m².    Physical Exam  Vitals reviewed.   Constitutional:       General: She is not in acute distress.     Appearance: Normal appearance.   Cardiovascular:      Pulses:           Dorsalis pedis pulses are 1+ on the right side and 1+ on the left side.        Posterior tibial pulses are 1+ on the right side and 1+ on the left side.   Musculoskeletal:      Right foot: Deformity present.      Right Lower Extremity: (Right fifth toe amputated)  Feet:      Right foot:      Protective Sensation: 10 sites tested. 4 sites sensed.      Skin integrity: Callus and dry skin present.      Toenail Condition: Right toenails are normal.      Left foot:      Protective Sensation: 10 sites tested. 4 sites sensed.      Skin integrity: Callus and dry skin present.      Toenail Condition: Left toenails are normal.      Comments: Reduced pedal pulses bilaterally.  Neurological:      Mental Status: She is alert.         HEMOGLOBIN A1C  Lab Results   Component Value Date    HGBA1C 10.3 09/01/2022       GLUCOSE  Glucose   Date Value Ref Range Status   09/01/2022 257 (A) 70 - 130 mg/dL Final         Assessment / Plan      Assessment & Plan:  1. Uncontrolled type 2 diabetes mellitus with hyperglycemia (HCC)  Her hemoglobin A1c has improved slightly but is well above goal at 10.3%.  We discussed the importance of getting her diabetes under better control.  We will increase her Novolin N to 54 units twice a day and she will continue the Novolin R 12 units plus her sliding scale.  I encouraged her to send in blood sugar readings for review and adjustment as needed.  I encouraged her to work on healthier eating habits.  I encouraged healthier snacking as well as portion control.  Her weight is up 1 " pound since her appointment in May.    - POC Glucose, Blood  - POC Glycosylated Hemoglobin (Hb A1C)    2. Type 2 diabetes mellitus with diabetic polyneuropathy, with long-term current use of insulin (HCC)  Her foot exam shows neuropathy with callus formation and reduced pedal pulses.  She does qualify for shoes and sees the podiatrist regularly.  We increased her insulin today to try to get her diabetes under better control.  I gave her some samples of Jardiance to try to get her through the donut hole.    3. Acquired hypothyroidism  TFTs pending.  She does not have time to wait for the lab today because she had to go to work so I gave her an order to have this done next week when she goes for her appointment for the primary care physician.  For now she will continue levothyroxine 175 mcg daily.  I will send a result note once I review the labs.  - TSH; Future  - TSH; Future    4. Primary hypertension  Her blood pressure is okay today.  She will continue her current medications.    Return in about 3 months (around 12/1/2022) for Recheck 3-4 mos.     This note was dictated using Dragon voice recognition.    Janay Jefferson PA-C  09/01/2022

## 2022-09-20 ENCOUNTER — PATIENT MESSAGE (OUTPATIENT)
Dept: ENDOCRINOLOGY | Facility: CLINIC | Age: 72
End: 2022-09-20

## 2022-09-28 RX ORDER — LEVOTHYROXINE SODIUM 0.15 MG/1
150 TABLET ORAL DAILY
Qty: 90 TABLET | Refills: 1 | Status: SHIPPED | OUTPATIENT
Start: 2022-09-28

## 2022-10-20 RX ORDER — BLOOD SUGAR DIAGNOSTIC
STRIP MISCELLANEOUS
Qty: 300 EACH | Refills: 3 | Status: SHIPPED | OUTPATIENT
Start: 2022-10-20 | End: 2022-10-25 | Stop reason: SDUPTHER

## 2022-10-25 RX ORDER — BLOOD SUGAR DIAGNOSTIC
STRIP MISCELLANEOUS
Qty: 300 EACH | Refills: 3 | Status: SHIPPED | OUTPATIENT
Start: 2022-10-25 | End: 2022-11-09

## 2022-11-09 RX ORDER — BLOOD SUGAR DIAGNOSTIC
STRIP MISCELLANEOUS
Qty: 300 EACH | Refills: 3 | Status: SHIPPED | OUTPATIENT
Start: 2022-11-09 | End: 2022-11-10 | Stop reason: SDUPTHER

## 2022-11-10 RX ORDER — BLOOD SUGAR DIAGNOSTIC
STRIP MISCELLANEOUS
Qty: 300 EACH | Refills: 3 | Status: SHIPPED | OUTPATIENT
Start: 2022-11-10

## 2022-12-13 ENCOUNTER — OFFICE VISIT (OUTPATIENT)
Dept: ENDOCRINOLOGY | Facility: CLINIC | Age: 72
End: 2022-12-13

## 2022-12-13 VITALS
OXYGEN SATURATION: 97 % | HEART RATE: 81 BPM | BODY MASS INDEX: 36.28 KG/M2 | DIASTOLIC BLOOD PRESSURE: 68 MMHG | SYSTOLIC BLOOD PRESSURE: 126 MMHG | HEIGHT: 68 IN | WEIGHT: 239.4 LBS

## 2022-12-13 DIAGNOSIS — E11.65 UNCONTROLLED TYPE 2 DIABETES MELLITUS WITH HYPERGLYCEMIA: Primary | ICD-10-CM

## 2022-12-13 DIAGNOSIS — I10 PRIMARY HYPERTENSION: ICD-10-CM

## 2022-12-13 DIAGNOSIS — E03.9 ACQUIRED HYPOTHYROIDISM: ICD-10-CM

## 2022-12-13 LAB
EXPIRATION DATE: NORMAL
EXPIRATION DATE: NORMAL
GLUCOSE BLDC GLUCOMTR-MCNC: 84 MG/DL (ref 70–130)
HBA1C MFR BLD: 9.8 %
Lab: NORMAL
Lab: NORMAL

## 2022-12-13 PROCEDURE — 3046F HEMOGLOBIN A1C LEVEL >9.0%: CPT | Performed by: PHYSICIAN ASSISTANT

## 2022-12-13 PROCEDURE — 83036 HEMOGLOBIN GLYCOSYLATED A1C: CPT | Performed by: PHYSICIAN ASSISTANT

## 2022-12-13 PROCEDURE — 82947 ASSAY GLUCOSE BLOOD QUANT: CPT | Performed by: PHYSICIAN ASSISTANT

## 2022-12-13 PROCEDURE — 99214 OFFICE O/P EST MOD 30 MIN: CPT | Performed by: PHYSICIAN ASSISTANT

## 2022-12-13 NOTE — PROGRESS NOTES
"     Office Note      Date: 2022  Patient Name: Nuris Ribeiro  MRN: [id number]  : 1950    Chief Complaint   Patient presents with   • Diabetes       History of Present Illness:   Nuris Ribeiro is a 72 y.o. female who presents for follow-up for type 2 diabetes.  She reports it has been a stressful few months.  Her son-in-law passed away suddenly over the summer and her daughter and 3 children are moving in with her and her  this weekend.  She reports she has not been monitoring her diet very closely.  She remains on Jardiance 25 mg daily, Novolin and 54 N twice a day and Novolin R 12 units plus an additional 2 units for every 50 her blood glucose is over 200 mg/dL.  She reports she has had a few lower readings in the evenings if she is out shopping and eating late but otherwise denies severe or frequent hypoglycemia.  She continues to check her blood glucose readings at least 3 times a day.  She remains on levothyroxine 150 mcg daily.  Her thyroid labs were normal last visit.  She continues to see the eye doctor every 6 months.  She denies any trouble with her feet today.  We did her foot exam at her appointment in September and she sees the podiatrist every 2 to 3 months.      Subjective     Review of Systems:   Review of Systems   Constitutional: Negative.    Cardiovascular: Negative.    Gastrointestinal: Negative.    Endocrine: Negative.    Neurological: Negative.        The following portions of the patient's history were reviewed and updated as appropriate: allergies, current medications, past family history, past medical history, past social history, past surgical history and problem list.    Objective     Vitals:    22 0844   BP: 126/68   Pulse: 81   SpO2: 97%   Weight: 109 kg (239 lb 6.4 oz)   Height: 172.7 cm (68\")   PainSc: 0-No pain     Body mass index is 36.4 kg/m².    Physical Exam  Vitals reviewed.   Constitutional:       General: She is not in acute distress.     " Appearance: Normal appearance.   Neurological:      Mental Status: She is alert.         HEMOGLOBIN A1C  Lab Results   Component Value Date    HGBA1C 9.8 12/13/2022       GLUCOSE  Glucose   Date Value Ref Range Status   12/13/2022 84 70 - 130 mg/dL Final       CMP  Lab Results   Component Value Date    GLUCOSE 255 (H) 05/24/2022    BUN 18 05/24/2022    CREATININE 1.40 (H) 05/24/2022    EGFRIFNONA 47 (L) 10/18/2018    EGFRIFAFRI 56 (L) 06/14/2018    BCR 12.9 05/24/2022    K 4.9 05/24/2022    CO2 25.0 05/24/2022    CALCIUM 9.2 05/24/2022    PROTENTOTREF 6.9 06/14/2018    LABIL2 1.3 (L) 06/14/2018    AST 17 05/24/2022    ALT 15 05/24/2022       Lab Results   Component Value Date    TSH 0.917 05/19/2022       Assessment / Plan      Assessment & Plan:  1. Uncontrolled type 2 diabetes mellitus with hyperglycemia (HCC)  Her hemoglobin A1c has improved slightly but is still well above goal at 9.8%.  I reviewed her blood glucose readings from her logs.  Her fasting readings are typically pretty good she has higher readings in the evenings.  Though some days her readings are always good depending on how she eats.  We discussed adding an additional 2 units of Novolin R if she is eating sweets during the day.  She will send in blood sugar readings for review and adjustment as needed.  We discussed considering switching her to insulin pens after the first of the year since the insulin should be At $35 per month.  We may also consider a continuous glucose monitor next year.  I gave her samples of Jardiance today she will continue the 25 mg daily.  [facility]  Her weight is up 6 pounds since her appointment in September.  We discussed the importance of healthy eating habits, limiting sweets and physical activity.    - POC Glucose, Blood  - POC Glycosylated Hemoglobin (Hb A1C)    2. Primary hypertension  Blood pressure is okay today.  She will continue her current medications.    3. Acquired  hypothyroidism  Her thyroid labs were normal last visit.  She will continue levothyroxine 150 mcg daily.  She feels well on this dose.      Return in about 3 months (around 3/13/2023) for Recheck.     This note was dictated using Dragon voice recognition.    Janay Jefferson PA-C  12/13/2022

## 2023-03-14 ENCOUNTER — OFFICE VISIT (OUTPATIENT)
Dept: ENDOCRINOLOGY | Facility: CLINIC | Age: 73
End: 2023-03-14
Payer: MEDICARE

## 2023-03-14 VITALS
RESPIRATION RATE: 18 BRPM | WEIGHT: 242 LBS | BODY MASS INDEX: 36.68 KG/M2 | SYSTOLIC BLOOD PRESSURE: 126 MMHG | HEIGHT: 68 IN | DIASTOLIC BLOOD PRESSURE: 68 MMHG | HEART RATE: 72 BPM | OXYGEN SATURATION: 96 %

## 2023-03-14 DIAGNOSIS — E11.65 UNCONTROLLED TYPE 2 DIABETES MELLITUS WITH HYPERGLYCEMIA: Primary | ICD-10-CM

## 2023-03-14 DIAGNOSIS — E03.9 ACQUIRED HYPOTHYROIDISM: ICD-10-CM

## 2023-03-14 DIAGNOSIS — I10 PRIMARY HYPERTENSION: ICD-10-CM

## 2023-03-14 LAB
EXPIRATION DATE: ABNORMAL
EXPIRATION DATE: NORMAL
GLUCOSE BLDC GLUCOMTR-MCNC: 137 MG/DL (ref 70–130)
HBA1C MFR BLD: 10.2 %
Lab: ABNORMAL
Lab: NORMAL

## 2023-03-14 PROCEDURE — 82947 ASSAY GLUCOSE BLOOD QUANT: CPT | Performed by: PHYSICIAN ASSISTANT

## 2023-03-14 PROCEDURE — 99214 OFFICE O/P EST MOD 30 MIN: CPT | Performed by: PHYSICIAN ASSISTANT

## 2023-03-14 PROCEDURE — 83036 HEMOGLOBIN GLYCOSYLATED A1C: CPT | Performed by: PHYSICIAN ASSISTANT

## 2023-03-14 PROCEDURE — 3046F HEMOGLOBIN A1C LEVEL >9.0%: CPT | Performed by: PHYSICIAN ASSISTANT

## 2023-03-14 NOTE — PROGRESS NOTES
Office Note      Date: 2023  Patient Name: Nuris Ribeiro  MRN: [id number]  : 1950    Chief Complaint   Patient presents with   • Diabetes     T2DM       History of Present Illness:   Nuris Ribeiro is a 72 y.o. female who presents for follow-up for type 2 diabetes.  She remains on Jardiance 25 mg daily, Novolin N 54 units with breakfast and 54 units with supper she continues to take Novolin R 12 units 3 times a day with meals plus an additional 2 units for every 50 her glucose is above 200 mg/dL.  She reports she has not been eating like she should.  Her daughter and her 3 grandchildren moved in with them since last visit and this has been a little hectic.  She denies any severe or frequent hypoglycemia.  She continues to check her blood sugar 3 times daily.  Discussed considering a continuous glucose monitor and she is interested in considering this depending on what the cost is.  She is up-to-date on her eye exam she goes regularly every 6 months.  Saw her podiatrist recently and they could not detect pulses she is seeing a vascular surgeon for further testing in the next few weeks.  She had her cholesterol checked recently by her cardiologist and this was good.  She had lab work done with her primary care physician there was a mixup at the lab and she did not get her results.  They are working on this.  She reports she has noted increased fatigue recently she is dozing off pretty regularly.  She remains on levothyroxine 150 mcg daily.  Her TFTs were normal in September but I will give her an order to recheck these.      Subjective     Review of Systems:   Review of Systems   Constitutional: Negative.    Cardiovascular: Negative.    Gastrointestinal: Negative.    Endocrine: Negative.    Neurological: Negative.        The following portions of the patient's history were reviewed and updated as appropriate: allergies, current medications, past family history, past medical history, past  "social history, past surgical history and problem list.    Objective     Vitals:    03/14/23 0853   BP: 126/68   Pulse: 72   Resp: 18   SpO2: 96%   Weight: 110 kg (242 lb)   Height: 172.7 cm (68\")   PainSc: 0-No pain     Body mass index is 36.8 kg/m².    Physical Exam  Vitals reviewed.   Constitutional:       General: She is not in acute distress.     Appearance: Normal appearance.   Neurological:      Mental Status: She is alert.         HEMOGLOBIN A1C  Lab Results   Component Value Date    HGBA1C 10.2 03/14/2023       GLUCOSE  Glucose   Date Value Ref Range Status   03/14/2023 137 (A) 70 - 130 mg/dL Final       Assessment / Plan      Assessment & Plan:  1. Uncontrolled type 2 diabetes mellitus with hyperglycemia (HCC)  Her hemoglobin A1c is higher than it was in December and well above goal at 10.2%.  For now she will continue Jardiance 25 mg daily, we will increase her Novolin N  To 58 units twice a day and increase her Novolin R to 14 units with lunch plus her additional sliding scale.  She will continue 12 units with breakfast and supper.  I think she would benefit from a continuous glucose monitor we will send metformin to [facility] to see if this is covered.  She would like to try the freestyle chirag.  Her blood pressure is okay today.  Her weight is up 3 pounds since her appointment in December.  I encouraged healthy eating habits and physical activity as tolerated.  I gave her a lab slip to have a urine microalbumin and CMP completed with her primary care physicians labs.  - POC Glycosylated Hemoglobin (Hb A1C)  - POC Glucose, Blood  - Comprehensive Metabolic Panel; Future  - Microalbumin / Creatinine Urine Ratio - Urine, Clean Catch; Future    2. Primary hypertension  Her blood pressure is okay today.  She will continue her current medications.    3. Acquired hypothyroidism  I gave her an order to have her TFTs checked with her labs with her primary care physician.  For now she will continue the levothyroxine " 150 mcg daily.  Will send note with results and plan.  - T4, Free; Future  - TSH; Future      Return in about 3 months (around 6/14/2023) for Recheck.     This note was dictated using Dragon voice recognition.    Janay Jefferson PA-C  03/14/2023

## 2023-03-14 NOTE — PATIENT INSTRUCTIONS
Increase novolin N 58 units twice a day  Increase novolin R to 14 units with lunch plus correction, continue 12 units with breakfast and supper

## 2023-03-25 LAB
ALBUMIN SERPL-MCNC: 3.7 G/DL (ref 3.7–4.7)
ALBUMIN/CREAT UR: 5 MG/G CREAT (ref 0–29)
ALBUMIN/GLOB SERPL: 1.5 {RATIO} (ref 1.2–2.2)
ALP SERPL-CCNC: 79 IU/L (ref 44–121)
ALT SERPL-CCNC: 20 IU/L (ref 0–32)
AST SERPL-CCNC: 22 IU/L (ref 0–40)
BILIRUB SERPL-MCNC: 0.7 MG/DL (ref 0–1.2)
BUN SERPL-MCNC: 19 MG/DL (ref 8–27)
BUN/CREAT SERPL: 13 (ref 12–28)
CALCIUM SERPL-MCNC: 9.6 MG/DL (ref 8.7–10.3)
CHLORIDE SERPL-SCNC: 102 MMOL/L (ref 96–106)
CO2 SERPL-SCNC: 23 MMOL/L (ref 20–29)
CREAT SERPL-MCNC: 1.52 MG/DL (ref 0.57–1)
CREAT UR-MCNC: 127.9 MG/DL
EGFRCR SERPLBLD CKD-EPI 2021: 36 ML/MIN/1.73
GLOBULIN SER CALC-MCNC: 2.5 G/DL (ref 1.5–4.5)
GLUCOSE SERPL-MCNC: 101 MG/DL (ref 70–99)
MICROALBUMIN UR-MCNC: 6.7 UG/ML
POTASSIUM SERPL-SCNC: 3.8 MMOL/L (ref 3.5–5.2)
PROT SERPL-MCNC: 6.2 G/DL (ref 6–8.5)
SODIUM SERPL-SCNC: 139 MMOL/L (ref 134–144)
T4 FREE SERPL-MCNC: 1.88 NG/DL (ref 0.82–1.77)
TSH SERPL DL<=0.005 MIU/L-ACNC: 3.37 UIU/ML (ref 0.45–4.5)

## 2023-04-18 RX ORDER — EMPAGLIFLOZIN 25 MG/1
TABLET, FILM COATED ORAL
Qty: 90 TABLET | Refills: 1 | Status: SHIPPED | OUTPATIENT
Start: 2023-04-18

## 2023-07-27 RX ORDER — LEVOTHYROXINE SODIUM 0.15 MG/1
TABLET ORAL
Qty: 90 TABLET | Refills: 1 | OUTPATIENT
Start: 2023-07-27

## 2023-09-26 ENCOUNTER — OFFICE VISIT (OUTPATIENT)
Dept: ENDOCRINOLOGY | Facility: CLINIC | Age: 73
End: 2023-09-26
Payer: MEDICARE

## 2023-09-26 VITALS
HEIGHT: 68 IN | SYSTOLIC BLOOD PRESSURE: 124 MMHG | BODY MASS INDEX: 37.28 KG/M2 | OXYGEN SATURATION: 96 % | DIASTOLIC BLOOD PRESSURE: 70 MMHG | HEART RATE: 79 BPM | WEIGHT: 246 LBS

## 2023-09-26 DIAGNOSIS — Z79.4 TYPE 2 DIABETES MELLITUS WITH HYPERGLYCEMIA, WITH LONG-TERM CURRENT USE OF INSULIN: Primary | ICD-10-CM

## 2023-09-26 DIAGNOSIS — Z79.4 TYPE 2 DIABETES MELLITUS WITH DIABETIC POLYNEUROPATHY, WITH LONG-TERM CURRENT USE OF INSULIN: ICD-10-CM

## 2023-09-26 DIAGNOSIS — I10 PRIMARY HYPERTENSION: ICD-10-CM

## 2023-09-26 DIAGNOSIS — E11.42 TYPE 2 DIABETES MELLITUS WITH DIABETIC POLYNEUROPATHY, WITH LONG-TERM CURRENT USE OF INSULIN: ICD-10-CM

## 2023-09-26 DIAGNOSIS — E11.65 TYPE 2 DIABETES MELLITUS WITH HYPERGLYCEMIA, WITH LONG-TERM CURRENT USE OF INSULIN: Primary | ICD-10-CM

## 2023-09-26 LAB
EXPIRATION DATE: NORMAL
EXPIRATION DATE: NORMAL
GLUCOSE BLDC GLUCOMTR-MCNC: 93 MG/DL (ref 70–130)
HBA1C MFR BLD: 9.3 %
Lab: NORMAL
Lab: NORMAL

## 2023-09-26 NOTE — PROGRESS NOTES
"     Office Note      Date: 2023  Patient Name: Nuris Ribeiro  MRN: [id number]  : 1950    Chief Complaint   Patient presents with    Diabetes       History of Present Illness:   Nuris Ribeiro is a 72 y.o. female who presents for follow up for Type 2 Diabetes diagnosed in .  She reports her blood sugars have been some better but she has not been eating like she should.  She reports she is taking her Jardiance 25 mg daily, she has increased her Novolin N to 60 units twice a day and takes Novolin R 16 units with meals plus an additional 2 units for every 50 mg/dL her blood glucose is above 200 mg/dL.  She reports she has had some trouble with her freestyle chirag accuracy recently and denies any trouble with hypoglycemia.  We have tried using Ozempic in the past but this was too costly for her.  Her daughter and grandsons are still living with them.  She is up-to-date on her eye exam she goes regularly every 6 months.  She just saw the podiatrist Dr. Garcia in August and reports she will be due for shoes in the next several weeks.  She had fasting labs completed in March.  She remains on levothyroxine 150 mcg daily.  She reports she is taking this regularly and correctly.  She reports she thinks she feels okay from a thyroid standpoint.        Subjective     Review of Systems:   Review of Systems   Constitutional: Negative.    Cardiovascular: Negative.    Gastrointestinal: Negative.    Endocrine: Negative.    Neurological: Negative.      The following portions of the patient's history were reviewed and updated as appropriate: allergies, current medications, past family history, past medical history, past social history, past surgical history, and problem list.    Objective     Vitals:    23 0845   BP: 124/70   Pulse: 79   SpO2: 96%   Weight: 112 kg (246 lb)   Height: 172.7 cm (68\")     Body mass index is 37.4 kg/m².    Physical Exam  Vitals reviewed.   Constitutional:       General: " She is not in acute distress.     Appearance: Normal appearance.   Cardiovascular:      Pulses:           Dorsalis pedis pulses are 1+ on the right side and 1+ on the left side.        Posterior tibial pulses are 0 on the right side and 0 on the left side.   Musculoskeletal:      Right foot: Deformity present.      Left foot: Deformity present.      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  2 sites sensed.      Skin integrity: Callus and dry skin present.      Toenail Condition: Right toenails are normal.      Left foot:      Protective Sensation: 10 sites tested.  2 sites sensed.      Skin integrity: Callus and dry skin present.      Toenail Condition: Left toenails are normal.      Comments: Charcot foot noted on the left  Neurological:      Mental Status: She is alert.       HEMOGLOBIN A1C  Lab Results   Component Value Date    HGBA1C 9.3 09/26/2023       GLUCOSE  Glucose   Date Value Ref Range Status   09/26/2023 93 70 - 130 mg/dL Final       CMP  Lab Results   Component Value Date    GLUCOSE 101 (H) 03/24/2023    BUN 19 03/24/2023    CREATININE 1.52 (H) 03/24/2023    EGFRIFNONA 47 (L) 10/18/2018    EGFRIFAFRI 56 (L) 06/14/2018    BCR 13 03/24/2023    K 3.8 03/24/2023    CO2 23 03/24/2023    CALCIUM 9.6 03/24/2023    PROTENTOTREF 6.2 03/24/2023    LABIL2 1.5 03/24/2023    AST 22 03/24/2023    ALT 20 03/24/2023       LIPID PANEL  Lab Results   Component Value Date    CHOL 116 10/18/2018    CHLPL 133 03/02/2017    TRIG 104 10/18/2018    HDL 39 (L) 10/18/2018    LDL 66 10/18/2018       URINE MICROALBUMIN/CREATININE RATIO  Microalbumin/Creatinine Ratio   Date Value Ref Range Status   03/24/2023 5 0 - 29 mg/g creat Final     Comment:                            Normal:                0 -  29                         Moderately increased: 30 - 300                         Severely increased:       >300         TSH  Lab Results    Component Value Date    TSH 3.370 03/24/2023       Assessment / Plan      Assessment & Plan:  1. Type 2 diabetes mellitus with hyperglycemia, with long-term current use of insulin  Her hemoglobin A1c has improved as compared to June but is still well above goal at 9.3%.  I reviewed her freestyle chirag download and her blood sugars tend to vary a lot.  She reports she does not feel this has been very accurate recently.  For now we will continue Jardiance 25 mg daily and Novolin N 60 units with breakfast and 60 units with supper as well as Novolin R 16 units with meals 3 times a day plus an additional 2 units for every 50 mg/dL her blood glucose is above 200 mg/dL.  I gave her a new freestyle chirag 2 sensor today to use.  She will send in blood sugar readings for review and adjustment as needed.  We discussed the importance of getting her diabetes under better control and healthier eating habits.  Her weight is down 1 pound since her appointment in June.  - POC Glucose, Blood  - POC Glycosylated Hemoglobin (Hb A1C)    2. Type 2 diabetes mellitus with diabetic polyneuropathy, with long-term current use of insulin  Her foot exam was completed today she does have neuropathy and will qualify for diabetic shoes.    3. Primary hypertension  Her blood pressure is okay today.  She will continue her current medications.      Return in about 4 months (around 1/26/2024) for Recheck.     This note was dictated using Dragon voice recognition.    Janay Jefferson PA-C  09/26/2023

## 2023-11-01 ENCOUNTER — TELEPHONE (OUTPATIENT)
Dept: ENDOCRINOLOGY | Facility: CLINIC | Age: 73
End: 2023-11-01

## 2023-11-01 NOTE — TELEPHONE ENCOUNTER
Caller: Nuris Ribeiro    Relationship: Self    Best call back number: [phone]    What form or medical record are you requesting: FORM TO GET DIABETIC SHOES.     Who is requesting this form or medical record from you: DR. LUIS ANTONIO MARTINEZ(PODIATRIST)    How would you like to receive the form or medical records (pick-up, mail, fax):   If fax, what is the fax number: --FAX NUMBER SHOULD BE IN PAPERWORK.   ITimeframe paperwork needed: AS SOOS AS POSSIBLE.     Additional notes: PAPERWORK THAT NEEDS TO BE FILLED OUT AND RETURNED TTO HE DOCTOR IN ORDER FOR HER TO GET HER DIABETIC SHOES.

## 2024-02-13 ENCOUNTER — OFFICE VISIT (OUTPATIENT)
Dept: ENDOCRINOLOGY | Facility: CLINIC | Age: 74
End: 2024-02-13
Payer: MEDICARE

## 2024-02-13 ENCOUNTER — PATIENT MESSAGE (OUTPATIENT)
Dept: ENDOCRINOLOGY | Facility: CLINIC | Age: 74
End: 2024-02-13

## 2024-02-13 VITALS
DIASTOLIC BLOOD PRESSURE: 73 MMHG | OXYGEN SATURATION: 94 % | BODY MASS INDEX: 37.59 KG/M2 | WEIGHT: 248 LBS | HEART RATE: 79 BPM | HEIGHT: 68 IN | SYSTOLIC BLOOD PRESSURE: 122 MMHG

## 2024-02-13 DIAGNOSIS — E11.65 TYPE 2 DIABETES MELLITUS WITH HYPERGLYCEMIA, WITH LONG-TERM CURRENT USE OF INSULIN: Primary | ICD-10-CM

## 2024-02-13 DIAGNOSIS — Z79.4 TYPE 2 DIABETES MELLITUS WITH HYPERGLYCEMIA, WITH LONG-TERM CURRENT USE OF INSULIN: Primary | ICD-10-CM

## 2024-02-13 DIAGNOSIS — I10 PRIMARY HYPERTENSION: ICD-10-CM

## 2024-02-13 DIAGNOSIS — E66.01 CLASS 2 SEVERE OBESITY DUE TO EXCESS CALORIES WITH SERIOUS COMORBIDITY AND BODY MASS INDEX (BMI) OF 37.0 TO 37.9 IN ADULT: ICD-10-CM

## 2024-02-13 DIAGNOSIS — E03.9 ACQUIRED HYPOTHYROIDISM: ICD-10-CM

## 2024-02-13 LAB
ALBUMIN UR-MCNC: <1.2 MG/DL
CREAT UR-MCNC: 113.2 MG/DL
EXPIRATION DATE: ABNORMAL
EXPIRATION DATE: ABNORMAL
GLUCOSE BLDC GLUCOMTR-MCNC: 132 MG/DL (ref 70–130)
HBA1C MFR BLD: 9.9 % (ref 4.5–5.7)
Lab: ABNORMAL
Lab: ABNORMAL
MICROALBUMIN/CREAT UR: NORMAL MG/G{CREAT}
T4 FREE SERPL-MCNC: 1.67 NG/DL (ref 0.93–1.7)
TSH SERPL DL<=0.05 MIU/L-ACNC: 7.99 UIU/ML (ref 0.27–4.2)

## 2024-02-13 PROCEDURE — 84443 ASSAY THYROID STIM HORMONE: CPT | Performed by: PHYSICIAN ASSISTANT

## 2024-02-13 PROCEDURE — 84439 ASSAY OF FREE THYROXINE: CPT | Performed by: PHYSICIAN ASSISTANT

## 2024-02-13 PROCEDURE — 95251 CONT GLUC MNTR ANALYSIS I&R: CPT | Performed by: PHYSICIAN ASSISTANT

## 2024-02-13 PROCEDURE — 99214 OFFICE O/P EST MOD 30 MIN: CPT | Performed by: PHYSICIAN ASSISTANT

## 2024-02-13 PROCEDURE — 3046F HEMOGLOBIN A1C LEVEL >9.0%: CPT | Performed by: PHYSICIAN ASSISTANT

## 2024-02-13 PROCEDURE — 82043 UR ALBUMIN QUANTITATIVE: CPT | Performed by: PHYSICIAN ASSISTANT

## 2024-02-13 PROCEDURE — 3078F DIAST BP <80 MM HG: CPT | Performed by: PHYSICIAN ASSISTANT

## 2024-02-13 PROCEDURE — 1159F MED LIST DOCD IN RCRD: CPT | Performed by: PHYSICIAN ASSISTANT

## 2024-02-13 PROCEDURE — 3074F SYST BP LT 130 MM HG: CPT | Performed by: PHYSICIAN ASSISTANT

## 2024-02-13 PROCEDURE — 83036 HEMOGLOBIN GLYCOSYLATED A1C: CPT | Performed by: PHYSICIAN ASSISTANT

## 2024-02-13 PROCEDURE — 1160F RVW MEDS BY RX/DR IN RCRD: CPT | Performed by: PHYSICIAN ASSISTANT

## 2024-02-13 PROCEDURE — 82570 ASSAY OF URINE CREATININE: CPT | Performed by: PHYSICIAN ASSISTANT

## 2024-02-13 RX ORDER — INSULIN GLARGINE 300 U/ML
120 INJECTION, SOLUTION SUBCUTANEOUS DAILY
Qty: 12 ML | Refills: 5 | Status: SHIPPED | OUTPATIENT
Start: 2024-02-13

## 2024-02-13 RX ORDER — INSULIN LISPRO 100 [IU]/ML
INJECTION, SOLUTION INTRAVENOUS; SUBCUTANEOUS
Qty: 24 ML | Refills: 6 | Status: SHIPPED | OUTPATIENT
Start: 2024-02-13

## 2024-02-13 RX ORDER — SEMAGLUTIDE 0.68 MG/ML
0.5 INJECTION, SOLUTION SUBCUTANEOUS WEEKLY
Qty: 3 ML | Refills: 5 | Status: SHIPPED | OUTPATIENT
Start: 2024-02-13

## 2024-02-13 RX ORDER — PEN NEEDLE, DIABETIC 31 GX5/16"
NEEDLE, DISPOSABLE MISCELLANEOUS
Qty: 200 EACH | Refills: 11 | Status: SHIPPED | OUTPATIENT
Start: 2024-02-13

## 2024-02-14 ENCOUNTER — PRIOR AUTHORIZATION (OUTPATIENT)
Dept: ENDOCRINOLOGY | Facility: CLINIC | Age: 74
End: 2024-02-14
Payer: MEDICARE

## 2024-02-16 RX ORDER — LEVOTHYROXINE SODIUM 175 UG/1
175 TABLET ORAL DAILY
Qty: 90 TABLET | Refills: 1 | Status: SHIPPED | OUTPATIENT
Start: 2024-02-16 | End: 2024-02-16 | Stop reason: SDUPTHER

## 2024-02-16 RX ORDER — LEVOTHYROXINE SODIUM 175 UG/1
175 TABLET ORAL DAILY
Qty: 90 TABLET | Refills: 1 | Status: SHIPPED | OUTPATIENT
Start: 2024-02-16

## 2024-03-25 ENCOUNTER — TELEPHONE (OUTPATIENT)
Dept: ENDOCRINOLOGY | Facility: CLINIC | Age: 74
End: 2024-03-25
Payer: MEDICARE

## 2024-03-25 NOTE — TELEPHONE ENCOUNTER
CALL BACK [phone]    PATIENT STATES THAT SHE NOW HAS NEW INSURANCE COVERAGE THROUGH [employer]. PATIENT STATES THAT WITH THIS INSURANCE, SHE CAN'T USE [facility] TO FILL HER RX FOR 'NOT THE MONITOR BUT ALL THE OTHER STUFF' FREESTYLE LIS 2. PATIENT STATES THAT SHE SPOKE WITH [facility] AND WAS TOLD THAT ITEMS LISTED ABOVE WOULD BE COVERED AND ALL THAT WOULD BE NEEDED WAS AN RX. ADVISED PATIENT THAT RX ARE NOT SENT TO INSURANCE COMPANIES, THAT THEY ARE SENT TO PHARMACIES OR DME COMPANIES. PATIENT DOES NOT KNOW WHERE THIS RX NEEDS TO BE SENT AND ASKED TO HAVE THIS OFFICE CALL MIKE TO FIND OUT.

## 2024-04-12 ENCOUNTER — TELEPHONE (OUTPATIENT)
Dept: ENDOCRINOLOGY | Facility: CLINIC | Age: 74
End: 2024-04-12

## 2024-04-12 NOTE — TELEPHONE ENCOUNTER
Pt called requesting to increase her dose of ozempic. She requested if we send in RX for it to go to [facility]. Pt requested a call back to consult.

## 2024-04-15 RX ORDER — SEMAGLUTIDE 1.34 MG/ML
1 INJECTION, SOLUTION SUBCUTANEOUS WEEKLY
Qty: 3 ML | Refills: 3 | Status: SHIPPED | OUTPATIENT
Start: 2024-04-15

## 2024-06-27 ENCOUNTER — OFFICE VISIT (OUTPATIENT)
Dept: ENDOCRINOLOGY | Facility: CLINIC | Age: 74
End: 2024-06-27
Payer: MEDICARE

## 2024-06-27 VITALS
DIASTOLIC BLOOD PRESSURE: 62 MMHG | HEIGHT: 68 IN | OXYGEN SATURATION: 96 % | HEART RATE: 81 BPM | BODY MASS INDEX: 36.37 KG/M2 | SYSTOLIC BLOOD PRESSURE: 120 MMHG | WEIGHT: 240 LBS

## 2024-06-27 DIAGNOSIS — E66.01 CLASS 2 SEVERE OBESITY DUE TO EXCESS CALORIES WITH SERIOUS COMORBIDITY AND BODY MASS INDEX (BMI) OF 36.0 TO 36.9 IN ADULT: ICD-10-CM

## 2024-06-27 DIAGNOSIS — E03.9 ACQUIRED HYPOTHYROIDISM: ICD-10-CM

## 2024-06-27 DIAGNOSIS — I10 PRIMARY HYPERTENSION: ICD-10-CM

## 2024-06-27 DIAGNOSIS — E11.65 TYPE 2 DIABETES MELLITUS WITH HYPERGLYCEMIA, WITH LONG-TERM CURRENT USE OF INSULIN: Primary | ICD-10-CM

## 2024-06-27 DIAGNOSIS — Z79.4 TYPE 2 DIABETES MELLITUS WITH HYPERGLYCEMIA, WITH LONG-TERM CURRENT USE OF INSULIN: Primary | ICD-10-CM

## 2024-06-27 LAB
EXPIRATION DATE: ABNORMAL
EXPIRATION DATE: ABNORMAL
GLUCOSE BLDC GLUCOMTR-MCNC: 450 MG/DL (ref 70–130)
HBA1C MFR BLD: 8.5 % (ref 4.5–5.7)
Lab: ABNORMAL
Lab: ABNORMAL

## 2024-06-27 RX ORDER — SEMAGLUTIDE 2.68 MG/ML
2 INJECTION, SOLUTION SUBCUTANEOUS WEEKLY
Qty: 3 ML | Refills: 5 | Status: SHIPPED | OUTPATIENT
Start: 2024-06-27

## 2024-06-27 NOTE — PROGRESS NOTES
Office Note      Date: 2024  Patient Name: Nuris Ribeiro  MRN: [id number]  : 1950    Chief Complaint   Patient presents with    Diabetes     Type 2 diabetes mellitus with hyperglycemia, with long-term current use of insulin       History of Present Illness:   Nuris Ribeiro is a 73 y.o. female who presents for follow-up for type 2 diabetes diagnosed in .  We added Ozempic last visit she is up to the 1 mg dose.  She reports her blood sugars overall have been good but she has not lost as much weight as she would have liked.  She reports this is costly and she has not been taking her Jardiance as regularly due to cost.  She is taking Toujeo 110 units daily and Humalog 16 units 3 times a day with meals.  She reports she has not been using the correction factor of 2 units for every 50 mg/dL her blood glucose is above 200 mg/dL very often because her blood sugars have been better.  She continues to use the freestyle chirag continuous glucose monitor but forgot to bring her reader today.  She had an appendectomy on Tuesday.  She reports prior to her surgery she had an episode of hypoglycemia but recently her blood sugars have been up some.  She reports she feels it is elevated this morning because she was unable to get her insulin or medication before she came to her appointment.  She reports the surgery went well and she is a little sore but overall doing well.  She is up-to-date on her eye exam she goes every 6 months.  She denies any trouble with her feet today.  We did her foot exam at her appointment in September and she sees the podiatrist.  Her TSH was elevated last visit so we increased her levothyroxine to 175 mcg daily.  She reports she has been taking this regularly.    Subjective     Review of Systems:   Review of Systems   Constitutional: Negative.    Cardiovascular: Negative.    Gastrointestinal: Negative.    Endocrine: Negative.    Neurological: Negative.        The following  "portions of the patient's history were reviewed and updated as appropriate: allergies, current medications, past family history, past medical history, past social history, past surgical history, and problem list.    Objective     Vitals:    06/27/24 1010   BP: 120/62   BP Location: Right arm   Patient Position: Sitting   Cuff Size: Adult   Pulse: 81   SpO2: 96%   Weight: 109 kg (240 lb)   Height: 172.7 cm (68\")     Body mass index is 36.49 kg/m².    Physical Exam  Vitals reviewed.   Constitutional:       General: She is not in acute distress.     Appearance: Normal appearance.   Neurological:      Mental Status: She is alert.         HEMOGLOBIN A1C  Lab Results   Component Value Date    HGBA1C 8.5 (A) 06/27/2024       GLUCOSE  Glucose   Date Value Ref Range Status   06/27/2024 450 (A) 70 - 130 mg/dL Final     Comment:     [facility]           TSH  Lab Results   Component Value Date    TSH 7.990 (H) 02/13/2024       Assessment / Plan      Assessment & Plan:  1. Type 2 diabetes mellitus with hyperglycemia, with long-term current use of insulin  Her hemoglobin A1c has improved significantly at 8.5%.  This is still above goal but much improved.  We will try increasing her Ozempic to the 2 mg weekly dose.  She will continue Jardiance 25 mg daily Toujeo 110 units daily and Humalog 16 units 3 times a day with meals.  We were unable to review her freestyle chirag reports today because she forgot her reader.  She will reach out with any issues with her blood glucose readings.  Her weight is down 8 pounds since her appointment in February.  I encouraged continued healthy eating habits and physical activity as tolerated and advised.  - POC Glycosylated Hemoglobin (Hb A1C)  - POC Glucose, Blood    2. Acquired hypothyroidism  She is due to go next week for a CBC with her primary care physician to follow-up after her appendicitis.  I gave her a lab slip to have TFTs checked with this blood work.  Will send note with results and plan.  " For now she will continue the levothyroxine 175 mcg daily.  She will reach out to me if she does not hear from me with the lab results within a week of having her blood drawn.  - T4, Free; Future  - TSH; Future  - T4, Free; Future  - TSH; Future    3. Primary hypertension  Her blood pressure is okay today.  She will continue her current medications.    4. Class 2 severe obesity due to excess calories with serious comorbidity and body mass index (BMI) of 36.0 to 36.9 in adult  Her weight is down 8 pounds since her appointment in February.  We will try increasing her Ozempic to better improve her diabetes and see if we can promote further weight loss.  I encouraged healthy eating habits and physical activity as tolerated and advised.      Return in about 4 months (around 10/27/2024) for Recheck.     This note was dictated using Dragon voice recognition.    Electronically signed by: JM Biggs  06/27/2024

## 2024-08-19 RX ORDER — INSULIN GLARGINE 300 U/ML
INJECTION, SOLUTION SUBCUTANEOUS
Qty: 12 ML | Refills: 2 | Status: SHIPPED | OUTPATIENT
Start: 2024-08-19

## 2024-08-19 NOTE — TELEPHONE ENCOUNTER
Rx Refill Note  Requested Prescriptions     Pending Prescriptions Disp Refills    Insulin Glargine, 2 Unit Dial, (Toujeo Max SoloStar) 300 UNIT/ML solution pen-injector injection [Pharmacy Med Name: TOUJEO MAX SOLOSTAR 300U/ML PEN 3ML] 12 mL 2     Sig: ADMINISTER 120 UNITS UNDER THE SKIN DAILY AS DIRECTED      Last office visit with prescribing clinician: 6/27/2024     Next office visit with prescribing clinician: 10/29/2024       Shannon Erazo MA  08/19/24, 08:35 EDT

## 2024-10-29 ENCOUNTER — PATIENT MESSAGE (OUTPATIENT)
Dept: ENDOCRINOLOGY | Facility: CLINIC | Age: 74
End: 2024-10-29

## 2024-10-29 ENCOUNTER — TELEPHONE (OUTPATIENT)
Dept: ENDOCRINOLOGY | Facility: CLINIC | Age: 74
End: 2024-10-29

## 2024-10-29 ENCOUNTER — OFFICE VISIT (OUTPATIENT)
Dept: ENDOCRINOLOGY | Facility: CLINIC | Age: 74
End: 2024-10-29
Payer: MEDICARE

## 2024-10-29 VITALS
BODY MASS INDEX: 36.07 KG/M2 | SYSTOLIC BLOOD PRESSURE: 118 MMHG | HEIGHT: 68 IN | DIASTOLIC BLOOD PRESSURE: 64 MMHG | WEIGHT: 238 LBS | OXYGEN SATURATION: 95 % | HEART RATE: 68 BPM

## 2024-10-29 DIAGNOSIS — E11.42 TYPE 2 DIABETES MELLITUS WITH DIABETIC POLYNEUROPATHY, WITH LONG-TERM CURRENT USE OF INSULIN: ICD-10-CM

## 2024-10-29 DIAGNOSIS — Z79.4 TYPE 2 DIABETES MELLITUS WITH HYPERGLYCEMIA, WITH LONG-TERM CURRENT USE OF INSULIN: Primary | ICD-10-CM

## 2024-10-29 DIAGNOSIS — E66.01 CLASS 2 SEVERE OBESITY DUE TO EXCESS CALORIES WITH SERIOUS COMORBIDITY AND BODY MASS INDEX (BMI) OF 36.0 TO 36.9 IN ADULT: ICD-10-CM

## 2024-10-29 DIAGNOSIS — Z79.4 TYPE 2 DIABETES MELLITUS WITH DIABETIC POLYNEUROPATHY, WITH LONG-TERM CURRENT USE OF INSULIN: ICD-10-CM

## 2024-10-29 DIAGNOSIS — E03.9 ACQUIRED HYPOTHYROIDISM: ICD-10-CM

## 2024-10-29 DIAGNOSIS — E11.65 TYPE 2 DIABETES MELLITUS WITH HYPERGLYCEMIA, WITH LONG-TERM CURRENT USE OF INSULIN: Primary | ICD-10-CM

## 2024-10-29 DIAGNOSIS — I10 PRIMARY HYPERTENSION: ICD-10-CM

## 2024-10-29 DIAGNOSIS — E66.812 CLASS 2 SEVERE OBESITY DUE TO EXCESS CALORIES WITH SERIOUS COMORBIDITY AND BODY MASS INDEX (BMI) OF 36.0 TO 36.9 IN ADULT: ICD-10-CM

## 2024-10-29 LAB
EXPIRATION DATE: ABNORMAL
EXPIRATION DATE: NORMAL
GLUCOSE BLDC GLUCOMTR-MCNC: 91 MG/DL (ref 70–130)
HBA1C MFR BLD: 7.5 % (ref 4.5–5.7)
Lab: ABNORMAL
Lab: NORMAL

## 2024-10-29 NOTE — TELEPHONE ENCOUNTER
"Caller: MIKE    Relationship to patient: INSURANCE    Best call back number: [phone]    Patient is needing: INSURANCE NOTE FOR OFFICE:  \"RECORDS SHOW THAT PT MAY HAVE DIABETES BUT DOES NOT TAKE STATIN [employer] GUIDELINES RECOMMEND PTS TAKE STATIN TO DECREASE CARDIO RISK.\"  "

## 2024-10-29 NOTE — PROGRESS NOTES
Office Note      Date: 10/29/2024  Patient Name: Nuris Ribeiro  MRN: [id number]  : 1950    Chief Complaint   Patient presents with    Diabetes       History of Present Illness:   Nuris Ribeiro is a 74 y.o. female who presents for follow-up for type 2 diabetes diagnosed in .  We increased her Ozempic to the 2 mg weekly dose last visit.  She has not had any trouble on the increased dose but is frustrated she has not lost as much weight as she would like to.  She is taking Jardiance 25 mg regularly, Toujeo 110 units daily and Humalog 16 units 3 times a day with meals plus an additional 2 units for every 50 mg/dL her blood glucose is above 200 mg/dL.  She continues to use the freestyle chirag 2 continuous glucose monitor.  She does not always swipe her sensor at night and we have little data during that time.  She reports she is retiring in December and is looking forward to this.  She reports she is up-to-date on her eye exam she was being seen every 6 months but is now only going annually.  She denies any trouble with her feet today.  She continues to see the podiatrist regularly and will be due for shoes soon.  She is due for her foot exam today.  She remains on levothyroxine 5 mcg daily.  She had labs completed with her primary care physician at [facility] and thinks they checked the thyroid but we never received a copy of these results.  We will try to track these down.  She got hearing aids since last visit and these seem to be working okay but have been an adjustment.      Subjective     Review of Systems:   Review of Systems   Constitutional: Negative.    Cardiovascular: Negative.    Gastrointestinal: Negative.    Endocrine: Negative.    Neurological: Negative.        The following portions of the patient's history were reviewed and updated as appropriate: allergies, current medications, past family history, past medical history, past social history, past surgical  "history, and problem list.    Objective     Vitals:    10/29/24 0941   BP: 118/64   Pulse: 68   SpO2: 95%   Weight: 108 kg (238 lb)   Height: 172.7 cm (68\")     Body mass index is 36.19 kg/m².    Physical Exam  Vitals reviewed.   Constitutional:       General: She is not in acute distress.     Appearance: Normal appearance.   Cardiovascular:      Pulses:           Dorsalis pedis pulses are 1+ on the right side and 1+ on the left side.        Posterior tibial pulses are 0 on the right side and 0 on the left side.   Musculoskeletal:      Right foot: Deformity present.      Left foot: Deformity present.   Feet:      Right foot:      Protective Sensation: 10 sites tested.  0 sites sensed.      Skin integrity: Callus and dry skin present.      Toenail Condition: Right toenails are normal.      Left foot:      Protective Sensation: 10 sites tested.  0 sites sensed.      Skin integrity: Callus and dry skin present.      Toenail Condition: Left toenails are normal.      Comments: right fifth digit amputation, left Charcot foot  Neurological:      Mental Status: She is alert.         HEMOGLOBIN A1C  Lab Results   Component Value Date    HGBA1C 7.5 (A) 10/29/2024       GLUCOSE  Glucose   Date Value Ref Range Status   10/29/2024 91 70 - 130 mg/dL Final       CMP  Lab Results   Component Value Date    GLUCOSE 101 (H) 03/24/2023    BUN 19 03/24/2023    CREATININE 1.52 (H) 03/24/2023    EGFRIFNONA 47 (L) 10/18/2018    EGFRIFAFRI 56 (L) 06/14/2018    BCR 13 03/24/2023    K 3.8 03/24/2023    CO2 23 03/24/2023    CALCIUM 9.6 03/24/2023    PROTENTOTREF 6.2 03/24/2023    LABIL2 1.5 03/24/2023    AST 22 03/24/2023    ALT 20 03/24/2023       LIPID PANEL  Lab Results   Component Value Date    CHOL 116 10/18/2018    CHLPL 133 03/02/2017    TRIG 104 10/18/2018    HDL 39 (L) 10/18/2018    LDL 66 10/18/2018       URINE MICROALBUMIN/CREATININE RATIO  Microalbumin/Creatinine Ratio   Date Value Ref Range Status   02/13/2024   Final     Comment: "     Unable to calculate       TSH  Lab Results   Component Value Date    TSH 7.990 (H) 02/13/2024       Assessment / Plan      Assessment & Plan:  1. Type 2 diabetes mellitus with hyperglycemia, with long-term current use of insulin  Her hemoglobin A1c has improved significantly at 7.5%.  I congratulated her on her efforts.  I reviewed her freestyle chirag download her average glucose is 126 mg/dL with 73% of her readings within target range, 12% high, 3% very high, 12% low and 0% very low.  She does have some spikes in the afternoons and this is likely due to to food she is eating.  There is not much data available for the evenings.  I encouraged her to swipe more regularly.  We discussed the hypoglycemia overnight.  She reports this is not typically a problem for her.  For now she will continue Ozempic 2 mg weekly, Jardiance 25 mg daily, Toujeo 110 units daily and Humalog 16 units 3 times a day with meals plus corrections.  She will send in blood sugar readings for review and adjustment as needed.  Her foot exam was okay today.  She will be due for her urine microalbumin/creatinine ratio next visit for monitoring.  - POC Glucose, Blood  - POC Glycosylated Hemoglobin (Hb A1C)    2. Type 2 diabetes mellitus with diabetic polyneuropathy, with long-term current use of insulin  Her foot exam was okay today.  She does qualify for shoes due to neuropathy, callus formation and foot deformities.    3. Acquired hypothyroidism  We never received the thyroid lab results.  We will try to contact her primary care physician for a copy of these.  For now she will continue levothyroxine 175 mcg daily.    4. Primary hypertension  Her blood pressure is excellent today.  She will continue her current medications.    5. Class 2 severe obesity due to excess calories with serious comorbidity and body mass index (BMI) of 36.0 to 36.9 in adult  Her weight is down 2 pounds since her appointment in June.  I encouraged healthy eating habits  and physical activity as tolerated.  We discussed considering switching to Mounjaro if she continues to stay stable and weight or her hemoglobin A1c creeps up.      Return in about 4 months (around 2/28/2025) for Recheck.     This note was dictated using Dragon voice recognition.    Electronically signed by: JM Biggs  10/29/2024

## 2024-11-06 LAB
T4 FREE SERPL-MCNC: 1.41 NG/DL (ref 0.82–1.77)
TSH SERPL DL<=0.005 MIU/L-ACNC: 2.42 UIU/ML (ref 0.45–4.5)

## 2024-11-06 RX ORDER — LEVOTHYROXINE SODIUM 175 UG/1
175 TABLET ORAL DAILY
Qty: 90 TABLET | Refills: 3 | Status: SHIPPED | OUTPATIENT
Start: 2024-11-06

## 2024-11-11 RX ORDER — INSULIN GLARGINE 300 U/ML
INJECTION, SOLUTION SUBCUTANEOUS
Qty: 12 ML | Refills: 11 | Status: SHIPPED | OUTPATIENT
Start: 2024-11-11

## 2024-11-26 RX ORDER — EMPAGLIFLOZIN 25 MG/1
25 TABLET, FILM COATED ORAL DAILY
Qty: 30 TABLET | Refills: 3 | Status: SHIPPED | OUTPATIENT
Start: 2024-11-26

## 2024-11-26 NOTE — TELEPHONE ENCOUNTER
Rx Refill Note  Requested Prescriptions     Pending Prescriptions Disp Refills    Jardiance 25 MG tablet tablet [Pharmacy Med Name: JARDIANCE 25MG TABLETS] 30 tablet 5     Sig: TAKE 1 TABLET BY MOUTH DAILY      Last office visit with prescribing clinician: 10/29/2024     Next office visit with prescribing clinician: 3/10/2025

## 2024-12-19 RX ORDER — SEMAGLUTIDE 2.68 MG/ML
INJECTION, SOLUTION SUBCUTANEOUS
Qty: 3 ML | Refills: 2 | Status: SHIPPED | OUTPATIENT
Start: 2024-12-19

## 2024-12-19 NOTE — TELEPHONE ENCOUNTER
Rx Refill Note  Requested Prescriptions     Pending Prescriptions Disp Refills    Semaglutide, 2 MG/DOSE, (Ozempic, 2 MG/DOSE,) 8 MG/3ML solution pen-injector [Pharmacy Med Name: OZEMPIC 2MG PER DOSE (8MG/3ML) PFP] 3 mL 2     Sig: INJECT 2 MG UNDER THE SKIN 1 TIME PER WEEK          Last office visit with prescribing clinician: 10/29/2024     Next office visit with prescribing clinician: 3/10/2025   }    Gilbert Ramos MA  12/19/24, 11:49 EST

## 2024-12-23 ENCOUNTER — TELEPHONE (OUTPATIENT)
Dept: ENDOCRINOLOGY | Facility: CLINIC | Age: 74
End: 2024-12-23
Payer: MEDICARE

## 2024-12-23 NOTE — TELEPHONE ENCOUNTER
Since she is giving her Humalog appropriately before eating and not after and the Toujeo dose is quite a bit higher compared to Humalog total daily dose, I recommend trying to decrease Toujeo from 110 to 100 units daily. If she continues to have hypoglycemia to let us know - could also bring her Anupam reader by so it can be downloaded. That would help us better assess the blood sugars.

## 2024-12-23 NOTE — TELEPHONE ENCOUNTER
Patient called c/o hypoglycemic episodes the past couple days in the evening after work. She states her blood sugars have been running ~58 a couple times through the week. She would give herself some juice and would bring it up. She states she snacks through-out the day at work. She is giving 16 u oh Humalog AC. She rarely uses the CF. Toujeo u200, 110 units in the AM. States it only drops in the evenings. Her Anupam is not sharing and she is giving her insulin appropriately. Advised Janay is out today but would send to the covering physician,.

## 2024-12-23 NOTE — TELEPHONE ENCOUNTER
Hub staff attempted to follow warm transfer process and was unsuccessful     Caller: Nuris Ribeiro    Relationship to patient: Self    Best call back number: [phone]; [facility] TO [facility]    Patient is needing: PATIENT RETURNED LENNY'S CALL. OFFICE ADVISED LENNY IS WITH ANOTHER PATIENT AND TO SEND MESSAGE. PLEASE RETURN PATIENT'S CALL.

## 2025-01-31 RX ORDER — INSULIN LISPRO 100 [IU]/ML
INJECTION, SOLUTION INTRAVENOUS; SUBCUTANEOUS
Qty: 24 ML | Refills: 6 | Status: SHIPPED | OUTPATIENT
Start: 2025-01-31

## 2025-01-31 NOTE — TELEPHONE ENCOUNTER
Rx Refill Note  Requested Prescriptions     Pending Prescriptions Disp Refills    Insulin Lispro, 1 Unit Dial, (HUMALOG) 100 UNIT/ML solution pen-injector [Pharmacy Med Name: INSULIN LISPRO 100U/ML KWIKPEN 3ML] 24 mL 6     Sig: INJECT 16 UNITS WITH MEALS PLUS CORRECTIONS 2 UNITS FOR EVERY 50 MG/DL BLOOD GLUCOSE IS OVER 200 MG/DL. MAX 75      Last office visit with prescribing clinician: 10/29/2024     Next office visit with prescribing clinician: 3/10/2025   {Jorge Carrasco MA  01/31/25, 14:05 EST

## 2025-03-10 ENCOUNTER — OFFICE VISIT (OUTPATIENT)
Dept: ENDOCRINOLOGY | Facility: CLINIC | Age: 75
End: 2025-03-10
Payer: MEDICARE

## 2025-03-10 VITALS
BODY MASS INDEX: 35.19 KG/M2 | HEIGHT: 68 IN | DIASTOLIC BLOOD PRESSURE: 68 MMHG | HEART RATE: 83 BPM | SYSTOLIC BLOOD PRESSURE: 118 MMHG | OXYGEN SATURATION: 97 % | WEIGHT: 232.2 LBS

## 2025-03-10 DIAGNOSIS — E11.65 TYPE 2 DIABETES MELLITUS WITH HYPERGLYCEMIA, WITH LONG-TERM CURRENT USE OF INSULIN: Primary | ICD-10-CM

## 2025-03-10 DIAGNOSIS — E03.9 ACQUIRED HYPOTHYROIDISM: ICD-10-CM

## 2025-03-10 DIAGNOSIS — Z79.4 TYPE 2 DIABETES MELLITUS WITH HYPERGLYCEMIA, WITH LONG-TERM CURRENT USE OF INSULIN: Primary | ICD-10-CM

## 2025-03-10 DIAGNOSIS — I10 PRIMARY HYPERTENSION: ICD-10-CM

## 2025-03-10 LAB
EXPIRATION DATE: ABNORMAL
EXPIRATION DATE: ABNORMAL
GLUCOSE BLDC GLUCOMTR-MCNC: 57 MG/DL (ref 70–130)
HBA1C MFR BLD: 8 % (ref 4.5–5.7)
Lab: ABNORMAL
Lab: ABNORMAL

## 2025-03-10 RX ORDER — INSULIN GLARGINE 300 U/ML
120 INJECTION, SOLUTION SUBCUTANEOUS DAILY
Qty: 12 ML | Refills: 11 | Status: SHIPPED | OUTPATIENT
Start: 2025-03-10

## 2025-03-10 NOTE — PROGRESS NOTES
"     Office Note      Date: 03/10/2025  Patient Name: Nuris Ribeiro  MRN: [id number]  : 1950    Chief Complaint   Patient presents with    Diabetes     Type II       History of Present Illness:   Nuris Ribeiro is a 74 y.o. female who presents for follow-up for type 2 diabetes diagnosed in .  She remains on Ozempic 2 mg weekly, Jardiance 25 mg daily and Toujeo 100 units daily as well as Humalog 16 units 3 times a day with meals plus an additional 2 units for every 50 mg/dL her glucose is above 200 mg/dL.  She continues to use the freestyle chirag continuous glucose monitor.  She reports she has had some trouble with hypoglycemia recently and some higher blood glucose readings overnight.  She is retired now and often stays up late and sleeps in.  She is up-to-date on her eye exam she goes regularly every 6 months.  She has an appointment next week.  She denies any trouble with her feet today.  We did her foot exam at her appointment in October.  She has not had any recent labs completed.  She is due for a urine microalbumin/creatinine ratio.  She does have a physical scheduled later this month.  Her thyroid levels were normal last visit.  She remains on levothyroxine 175 mcg daily.      Subjective     Review of Systems:   Review of Systems   Constitutional: Negative.    Cardiovascular: Negative.    Gastrointestinal: Negative.    Endocrine: Negative.        The following portions of the patient's history were reviewed and updated as appropriate: allergies, current medications, past family history, past medical history, past social history, past surgical history, and problem list.    Objective     Vitals:    03/10/25 1324   BP: 118/68   BP Location: Right arm   Patient Position: Sitting   Cuff Size: Adult   Pulse: 83   SpO2: 97%   Weight: 105 kg (232 lb 3.2 oz)   Height: 172.7 cm (67.99\")     Body mass index is 35.31 kg/m².    Physical Exam  Vitals reviewed.   Constitutional:       General: She is " not in acute distress.     Appearance: Normal appearance.   Neurological:      Mental Status: She is alert.         HEMOGLOBIN A1C  Lab Results   Component Value Date    HGBA1C 8.0 (A) 03/10/2025       GLUCOSE  Glucose   Date Value Ref Range Status   03/10/2025 57 (A) 70 - 130 mg/dL Final       URINE MICROALBUMIN/CREATININE RATIO  Microalbumin/Creatinine Ratio   Date Value Ref Range Status   02/13/2024   Final     Comment:     Unable to calculate       TSH  Lab Results   Component Value Date    TSH 2.420 11/05/2024       Assessment / Plan      Assessment & Plan:  1. Type 2 diabetes mellitus with hyperglycemia, with long-term current use of insulin  Her hemoglobin A1c today is up some as compared to October at 8.0%.  I reviewed her freestyle chirag download.  She does not swipe her reader in the evenings so there is no data available.  Her blood sugars during the day tend to be okay but her blood sugar is very high at night.  She reports she does snack at night when she is up late.  Her average glucose for the last 2 weeks is 168 mg/dL 54% of her readings within target range, 33% high, 15% very high, 7% low and 1% very low.  We will reduce her Toujeo to 90 units daily to try to prevent the hypoglycemia in the morning.  We will also reduce her Humalog with breakfast and lunch from 16 to 14 units.  She will continue 16 units plus her sliding scale with supper.  We discussed that if her blood glucose readings are elevated at night we may need to increase her evening meal dose of Humalog.  She will try to swipe her chirag more regularly to get more readings.  Her glucose was low when she got to the office this afternoon at 57 mg/dL.  She was given juice and crackers and this was rechecked before she was leaving and was 174 mg/dL.  Her weight is down 6 pounds since her appointment in October.  I encouraged healthy eating habits and physical activity as tolerated.  I gave her a lab slip to have a CMP, TFTs and urine  microalbumin/creatinine ratio when she goes for her visit with her primary care physician in a few weeks.  I gave her some samples of Jardiance today and refilled her Toujeo max pens.  - POC Glucose, Blood  - POC Glycosylated Hemoglobin (Hb A1C)  - T4, Free; Future  - TSH; Future  - Comprehensive Metabolic Panel; Future  - Microalbumin / Creatinine Urine Ratio - Urine, Clean Catch; Future    2. Acquired hypothyroidism  I gave her a lab slip to have TFTs checked in the next few weeks with labs with her primary care physician.  For now she will continue the levothyroxine 175 mcg daily.  - T4, Free; Future  - TSH; Future    3. Primary hypertension  Her blood pressure is okay today.  She will continue her current medications.      Return in about 4 months (around 7/10/2025).     This note was dictated using Dragon voice recognition.    Electronically signed by: JM Biggs  03/10/2025

## 2025-03-10 NOTE — PATIENT INSTRUCTIONS
Reduce Toujeo to 90 units every morning  Reduce Humalog to 14 units + sliding scale with breakfast, and lunch  Continue 16 units + ss at supper-- if readings are staying elevated after supper may need to increase- reach out to the office    Sliding scale is +2 units for every 50 mg/dl glucose is above 200

## 2025-03-11 ENCOUNTER — PRIOR AUTHORIZATION (OUTPATIENT)
Dept: ENDOCRINOLOGY | Facility: CLINIC | Age: 75
End: 2025-03-11
Payer: MEDICARE

## 2025-03-11 ENCOUNTER — TELEPHONE (OUTPATIENT)
Dept: ENDOCRINOLOGY | Facility: CLINIC | Age: 75
End: 2025-03-11
Payer: MEDICARE

## 2025-03-12 NOTE — TELEPHONE ENCOUNTER
Nuris Ribeiro (Key: [id number])  PA Case ID #: [id number]  Rx #: [id number]  Need Help? Call us at [phone]  Outcome  Approved today by [employer] 2017  PA Case: [id number], Status: Approved, Coverage Starts on: 1/1/2025 12:00:00 AM, Coverage Ends on: 3/12/2026 12:00:00 AM.  Effective Date: 1/1/2025  Authorization Expiration Date: 3/12/2026  Drug  Toujeo Max SoloStar 300UNIT/ML pen-injectors  ePA cloud logo  Form  [employer] Electronic PA Form (2017 N[employer])

## 2025-04-02 NOTE — TELEPHONE ENCOUNTER
Rx Refill Note  Requested Prescriptions     Pending Prescriptions Disp Refills    empagliflozin (Jardiance) 25 MG tablet tablet 30 tablet 3     Sig: Take 1 tablet by mouth Daily.      Last office visit with prescribing clinician: 3/10/2025     Next office visit with prescribing clinician: 8/25/2025     Maria T Abrams MA  04/02/25, 11:51 EDT

## 2025-04-02 NOTE — TELEPHONE ENCOUNTER
Patient called and stated she needs Jardiance 25 mg, Insulin, and Mounjaro 5 mg called in to [facility] on [address].

## 2025-04-07 ENCOUNTER — TELEPHONE (OUTPATIENT)
Dept: ENDOCRINOLOGY | Facility: CLINIC | Age: 75
End: 2025-04-07
Payer: MEDICARE

## 2025-04-07 ENCOUNTER — TELEPHONE (OUTPATIENT)
Dept: ENDOCRINOLOGY | Facility: CLINIC | Age: 75
End: 2025-04-07

## 2025-04-07 RX ORDER — TIRZEPATIDE 12.5 MG/.5ML
12.5 INJECTION, SOLUTION SUBCUTANEOUS
Qty: 2 ML | Refills: 2 | Status: SHIPPED | OUTPATIENT
Start: 2025-04-07

## 2025-04-07 NOTE — TELEPHONE ENCOUNTER
Rx Refill Note  Requested Prescriptions     Signed Prescriptions Disp Refills    Continuous Glucose Sensor (FreeStyle Anupam 2 Sensor) misc 6 each 3     Sig: Use 1 each Every 14 (Fourteen) Days. Dx E11.65     Authorizing Provider: SHELDON RAHMAN     Ordering User: ROSIO MACIAS      Last office visit with prescribing clinician: 3/10/2025     Next office visit with prescribing clinician: 8/25/2025       Rosio Macias  04/07/25, 13:24 EDT

## 2025-04-07 NOTE — TELEPHONE ENCOUNTER
Pt called requesting a prescription for Mounjaro 5 mg. Currently taking Ozempic 2 mg is not working for her pt has not lost any weight on medication. Pt was told to contact insurance company to see if covered and is. Please send to [facility] [address] [place], [place]. Pt is aware Janay is out this week

## 2025-04-07 NOTE — TELEPHONE ENCOUNTER
Spoke with patient. Notified her that Rx's for sensors and Mounjaro 12.5 mg weekly were sent to her pharmacy.   Electronically Signed: Linnette Dang MD

## 2025-04-07 NOTE — TELEPHONE ENCOUNTER
PT CALLED STATING HER INSURANCE WILL COVER MOUNJARO. SHE REQUESTED AN RX TO BE SENT IN TO [facility] ON [address].

## 2025-04-09 ENCOUNTER — TELEPHONE (OUTPATIENT)
Dept: ENDOCRINOLOGY | Facility: CLINIC | Age: 75
End: 2025-04-09
Payer: MEDICARE

## 2025-04-09 NOTE — TELEPHONE ENCOUNTER
Pt had these labs done recently monday and she will have them faxed to us   TSH 0.883  T3 FREE 2.6  T4 1.81  She just wanted Janay to know these results  Pts number  [phone]

## 2025-04-25 ENCOUNTER — TELEPHONE (OUTPATIENT)
Dept: ENDOCRINOLOGY | Facility: CLINIC | Age: 75
End: 2025-04-25
Payer: MEDICARE

## 2025-06-03 RX ORDER — PEN NEEDLE, DIABETIC 31 GX5/16"
NEEDLE, DISPOSABLE MISCELLANEOUS
Qty: 100 EACH | Refills: 1 | Status: SHIPPED | OUTPATIENT
Start: 2025-06-03

## 2025-08-25 ENCOUNTER — OFFICE VISIT (OUTPATIENT)
Dept: ENDOCRINOLOGY | Facility: CLINIC | Age: 75
End: 2025-08-25
Payer: MEDICARE

## 2025-08-25 VITALS
WEIGHT: 236.4 LBS | SYSTOLIC BLOOD PRESSURE: 112 MMHG | HEART RATE: 60 BPM | HEIGHT: 68 IN | OXYGEN SATURATION: 98 % | BODY MASS INDEX: 35.83 KG/M2 | DIASTOLIC BLOOD PRESSURE: 74 MMHG

## 2025-08-25 DIAGNOSIS — Z79.4 TYPE 2 DIABETES MELLITUS WITH HYPERGLYCEMIA, WITH LONG-TERM CURRENT USE OF INSULIN: Primary | ICD-10-CM

## 2025-08-25 DIAGNOSIS — E03.9 ACQUIRED HYPOTHYROIDISM: ICD-10-CM

## 2025-08-25 DIAGNOSIS — E11.65 TYPE 2 DIABETES MELLITUS WITH HYPERGLYCEMIA, WITH LONG-TERM CURRENT USE OF INSULIN: Primary | ICD-10-CM

## 2025-08-25 DIAGNOSIS — I10 PRIMARY HYPERTENSION: ICD-10-CM

## 2025-08-25 LAB
EXPIRATION DATE: ABNORMAL
EXPIRATION DATE: NORMAL
GLUCOSE BLDC GLUCOMTR-MCNC: 96 MG/DL (ref 70–130)
HBA1C MFR BLD: 8.9 % (ref 4.5–5.7)
Lab: ABNORMAL
Lab: NORMAL

## 2025-08-25 PROCEDURE — 95251 CONT GLUC MNTR ANALYSIS I&R: CPT | Performed by: PHYSICIAN ASSISTANT

## 2025-08-25 PROCEDURE — 1159F MED LIST DOCD IN RCRD: CPT | Performed by: PHYSICIAN ASSISTANT

## 2025-08-25 PROCEDURE — 1160F RVW MEDS BY RX/DR IN RCRD: CPT | Performed by: PHYSICIAN ASSISTANT

## 2025-08-25 PROCEDURE — 83036 HEMOGLOBIN GLYCOSYLATED A1C: CPT | Performed by: PHYSICIAN ASSISTANT

## 2025-08-25 PROCEDURE — 3074F SYST BP LT 130 MM HG: CPT | Performed by: PHYSICIAN ASSISTANT

## 2025-08-25 PROCEDURE — 3052F HG A1C>EQUAL 8.0%<EQUAL 9.0%: CPT | Performed by: PHYSICIAN ASSISTANT

## 2025-08-25 PROCEDURE — 3078F DIAST BP <80 MM HG: CPT | Performed by: PHYSICIAN ASSISTANT

## 2025-08-25 PROCEDURE — 84443 ASSAY THYROID STIM HORMONE: CPT | Performed by: PHYSICIAN ASSISTANT

## 2025-08-25 PROCEDURE — 99214 OFFICE O/P EST MOD 30 MIN: CPT | Performed by: PHYSICIAN ASSISTANT

## 2025-08-25 PROCEDURE — 80048 BASIC METABOLIC PNL TOTAL CA: CPT | Performed by: PHYSICIAN ASSISTANT

## 2025-08-25 PROCEDURE — 84439 ASSAY OF FREE THYROXINE: CPT | Performed by: PHYSICIAN ASSISTANT

## 2025-08-25 RX ORDER — BLOOD-GLUCOSE SENSOR
1 EACH MISCELLANEOUS SEE ADMIN INSTRUCTIONS
Qty: 6 EACH | Refills: 3 | Status: SHIPPED | OUTPATIENT
Start: 2025-08-25

## 2025-08-25 RX ORDER — TRAMADOL HYDROCHLORIDE 50 MG/1
50 TABLET ORAL AS NEEDED
COMMUNITY

## 2025-08-26 ENCOUNTER — RESULTS FOLLOW-UP (OUTPATIENT)
Dept: ENDOCRINOLOGY | Facility: CLINIC | Age: 75
End: 2025-08-26
Payer: MEDICARE

## 2025-08-26 LAB
ANION GAP SERPL CALCULATED.3IONS-SCNC: 10.1 MMOL/L (ref 5–15)
BUN SERPL-MCNC: 14 MG/DL (ref 8–23)
BUN/CREAT SERPL: 13 (ref 7–25)
CALCIUM SPEC-SCNC: 9.9 MG/DL (ref 8.6–10.5)
CHLORIDE SERPL-SCNC: 108 MMOL/L (ref 98–107)
CO2 SERPL-SCNC: 22.9 MMOL/L (ref 22–29)
CREAT SERPL-MCNC: 1.08 MG/DL (ref 0.57–1)
EGFRCR SERPLBLD CKD-EPI 2021: 54.3 ML/MIN/1.73
GLUCOSE SERPL-MCNC: 79 MG/DL (ref 65–99)
POTASSIUM SERPL-SCNC: 4.4 MMOL/L (ref 3.5–5.2)
SODIUM SERPL-SCNC: 141 MMOL/L (ref 136–145)
T4 FREE SERPL-MCNC: 1.79 NG/DL (ref 0.92–1.68)
TSH SERPL DL<=0.05 MIU/L-ACNC: 1.58 UIU/ML (ref 0.27–4.2)

## 2025-08-26 RX ORDER — LEVOTHYROXINE SODIUM 175 UG/1
175 TABLET ORAL DAILY
Qty: 90 TABLET | Refills: 3 | Status: SHIPPED | OUTPATIENT
Start: 2025-08-26

## (undated) DEVICE — PK BARIATRIC 10

## (undated) DEVICE — [HIGH FLOW INSUFFLATOR,  DO NOT USE IF PACKAGE IS DAMAGED,  KEEP DRY,  KEEP AWAY FROM SUNLIGHT,  PROTECT FROM HEAT AND RADIOACTIVE SOURCES.]: Brand: PNEUMOSURE

## (undated) DEVICE — SKIN AFFIX SURG ADHESIVE 72/CS 0.55ML: Brand: MEDLINE

## (undated) DEVICE — TIP COVER ACCESSORY

## (undated) DEVICE — 50" SINGLE PATIENT USE HOVERMATT: Brand: SINGLE PATIENT USE HOVERMATT

## (undated) DEVICE — GLV SURG SENSICARE W/ALOE PF LF 6.5 STRL

## (undated) DEVICE — SYNCHROSEAL: Brand: DA VINCI ENERGY

## (undated) DEVICE — ENDOPATH PNEUMONEEDLE INSUFFLATION NEEDLES WITH LUER LOCK CONNECTORS 120MM: Brand: ENDOPATH

## (undated) DEVICE — SYS CLS PORTSITE CT CLOSESURE 5AND10/12

## (undated) DEVICE — AIRWY 90MM NO9

## (undated) DEVICE — SYR LUERLOK 5CC

## (undated) DEVICE — ARM DRAPE

## (undated) DEVICE — ENCORE® LATEX MICRO SIZE 7.5, STERILE LATEX POWDER-FREE SURGICAL GLOVE: Brand: ENCORE

## (undated) DEVICE — ANTIBACTERIAL UNDYED BRAIDED (POLYGLACTIN 910), SYNTHETIC ABSORBABLE SUTURE: Brand: COATED VICRYL

## (undated) DEVICE — MEDI-VAC YANKAUER SUCTION HANDLE W/BULBOUS TIP: Brand: CARDINAL HEALTH

## (undated) DEVICE — CANNULA SEAL

## (undated) DEVICE — FLTR PLUMEPORT LAP W/CONN STRL

## (undated) DEVICE — PATIENT RETURN ELECTRODE, SINGLE-USE, CONTACT QUALITY MONITORING, ADULT, WITH 9FT CORD, FOR PATIENTS WEIGING OVER 33LBS. (15KG): Brand: MEGADYNE

## (undated) DEVICE — COLUMN DRAPE

## (undated) DEVICE — GLV SURG SIGNATURE TOUCH PF LTX 8 STRL BX/50

## (undated) DEVICE — ENDOPATH XCEL UNIVERSAL TROCAR STABLILITY SLEEVES: Brand: ENDOPATH XCEL

## (undated) DEVICE — ST TBG CONN PNEUMOCLEAR EVAC SMOKE HEAT/HUMID

## (undated) DEVICE — MANIP UTER RUMI 2 KOH EFFICIENT 3CM BL

## (undated) DEVICE — PK MAJ GYN DAVINCI 10

## (undated) DEVICE — SUT MONOCRYL PLS ANTIB UND 3/0  PS1 27IN

## (undated) DEVICE — APPL CHLORAPREP TINTED 26ML TEAL

## (undated) DEVICE — TISSUE RETRIEVAL SYSTEM: Brand: INZII RETRIEVAL SYSTEM

## (undated) DEVICE — BLADELESS OBTURATOR: Brand: WECK VISTA

## (undated) DEVICE — ADHS SKIN PREMIERPRO EXOFIN TOPICAL HI/VISC .5ML

## (undated) DEVICE — ECHELON FLEX POWERED PLUS LONG ARTICULATING ENDOSCOPIC LINEAR CUTTER, 60MM: Brand: ECHELON FLEX

## (undated) DEVICE — 3M™ STERI-DRAPE™ OPERATION TAPE, 10 CM X 55 CM 9099: Brand: 3M™ STERI-DRAPE™

## (undated) DEVICE — CONTN GRAD MEAS TRIANG 32OZ BLK

## (undated) DEVICE — BLANKT WARM UPPR/BDY ARM/OUT 57X196CM

## (undated) DEVICE — HARMONIC ACE +7 LAPAROSCOPIC SHEARS ADVANCED HEMOSTASIS 5MM DIAMETER 45CM SHAFT LENGTH  FOR USE WITH GRAY HAND PIECE ONLY: Brand: HARMONIC ACE

## (undated) DEVICE — SCRB SURG BACTOSHIELD CHG 4PCT 4OZ

## (undated) DEVICE — ENDOPATH XCEL BLADELESS TROCARS WITH STABILITY SLEEVES: Brand: ENDOPATH XCEL

## (undated) DEVICE — CANNULA,ADULT,SOFT-TOUCH,7TUBE,SC: Brand: MEDLINE

## (undated) DEVICE — ANCHOR TISSUE RETRIEVAL SYSTEM, BAG SIZE 125 ML, PORT SIZE 8 MM: Brand: ANCHOR TISSUE RETRIEVAL SYSTEM

## (undated) DEVICE — APL DUPLOSPRAYER MIS 40CM

## (undated) DEVICE — ANTIBACTERIAL UNDYED BRAIDED (POLYGLACTIN 910), SYNTHETIC ABSORBABLE SURGICAL SUTURE: Brand: COATED VICRYL

## (undated) DEVICE — TROCAR: Brand: KII FIOS FIRST ENTRY

## (undated) DEVICE — DUAL LUMEN STOMACH TUBE,ANTI-REFLUX VALVE: Brand: SALEM SUMP

## (undated) DEVICE — UNDERGLV SURG BIOGEL INDICAT PF 61/2 GRN

## (undated) DEVICE — MEDI-VAC NON-CONDUCTIVE SUCTION TUBING: Brand: CARDINAL HEALTH

## (undated) DEVICE — TRENDELENBURG WINGPAD POSITIONING KIT DELUXE - WITHOUT BODY STRAP: Brand: SOULE MEDICAL

## (undated) DEVICE — DEFOGGER!" ANTI FOG KIT: Brand: DEROYAL

## (undated) DEVICE — SUT MNCRYL PLS ANTIB UD 3/0 PS2 27IN

## (undated) DEVICE — SHEET, DRAPE, SPLIT, STERILE: Brand: MEDLINE

## (undated) DEVICE — MANIP UTER RUMI TP 6.7MMX8CM BLU